# Patient Record
Sex: FEMALE | Race: WHITE | NOT HISPANIC OR LATINO | Employment: OTHER | URBAN - METROPOLITAN AREA
[De-identification: names, ages, dates, MRNs, and addresses within clinical notes are randomized per-mention and may not be internally consistent; named-entity substitution may affect disease eponyms.]

---

## 2018-05-22 ENCOUNTER — HISTORICAL (OUTPATIENT)
Dept: RADIOLOGY | Facility: HOSPITAL | Age: 58
End: 2018-05-22

## 2019-03-10 ENCOUNTER — HOSPITAL ENCOUNTER (OUTPATIENT)
Dept: MEDSURG UNIT | Facility: HOSPITAL | Age: 59
End: 2019-03-12
Attending: INTERNAL MEDICINE | Admitting: INTERNAL MEDICINE

## 2019-04-15 ENCOUNTER — HISTORICAL (OUTPATIENT)
Dept: ADMINISTRATIVE | Facility: HOSPITAL | Age: 59
End: 2019-04-15

## 2019-04-16 ENCOUNTER — HISTORICAL (OUTPATIENT)
Dept: RADIOLOGY | Facility: HOSPITAL | Age: 59
End: 2019-04-16

## 2020-12-24 ENCOUNTER — HISTORICAL (OUTPATIENT)
Dept: RADIOLOGY | Facility: HOSPITAL | Age: 60
End: 2020-12-24

## 2021-07-13 ENCOUNTER — HISTORICAL (OUTPATIENT)
Dept: CARDIOLOGY | Facility: HOSPITAL | Age: 61
End: 2021-07-13

## 2021-09-04 ENCOUNTER — HISTORICAL (OUTPATIENT)
Dept: ADMINISTRATIVE | Facility: HOSPITAL | Age: 61
End: 2021-09-04

## 2021-09-06 LAB — FINAL CULTURE: NORMAL

## 2021-09-07 LAB — FINAL CULTURE: NO GROWTH

## 2022-03-14 ENCOUNTER — HISTORICAL (OUTPATIENT)
Dept: ADMINISTRATIVE | Facility: HOSPITAL | Age: 62
End: 2022-03-14

## 2022-04-09 ENCOUNTER — HISTORICAL (OUTPATIENT)
Dept: ADMINISTRATIVE | Facility: HOSPITAL | Age: 62
End: 2022-04-09
Payer: MEDICAID

## 2022-04-26 VITALS
OXYGEN SATURATION: 96 % | DIASTOLIC BLOOD PRESSURE: 86 MMHG | HEIGHT: 64 IN | WEIGHT: 169.75 LBS | SYSTOLIC BLOOD PRESSURE: 160 MMHG | BODY MASS INDEX: 28.98 KG/M2
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
## 2022-07-19 ENCOUNTER — HOSPITAL ENCOUNTER (EMERGENCY)
Facility: HOSPITAL | Age: 62
Discharge: HOME | End: 2022-07-19
Attending: FAMILY MEDICINE
Payer: MEDICAID

## 2022-07-19 VITALS
DIASTOLIC BLOOD PRESSURE: 70 MMHG | TEMPERATURE: 98 F | BODY MASS INDEX: 28.32 KG/M2 | SYSTOLIC BLOOD PRESSURE: 138 MMHG | WEIGHT: 165.88 LBS | HEART RATE: 70 BPM | RESPIRATION RATE: 20 BRPM | OXYGEN SATURATION: 98 % | HEIGHT: 64 IN

## 2022-07-19 DIAGNOSIS — S29.011A CHEST WALL MUSCLE STRAIN, INITIAL ENCOUNTER: ICD-10-CM

## 2022-07-19 DIAGNOSIS — R10.9 RIGHT FLANK PAIN: Primary | ICD-10-CM

## 2022-07-19 LAB
ALBUMIN SERPL-MCNC: 3.9 GM/DL (ref 3.4–4.8)
ALBUMIN/GLOB SERPL: 1.3 RATIO (ref 1.1–2)
ALP SERPL-CCNC: 73 UNIT/L (ref 40–150)
ALT SERPL-CCNC: 29 UNIT/L (ref 0–55)
APPEARANCE UR: CLEAR
AST SERPL-CCNC: 21 UNIT/L (ref 5–34)
BACTERIA #/AREA URNS AUTO: ABNORMAL /HPF
BASOPHILS # BLD AUTO: 0.07 X10(3)/MCL (ref 0–0.2)
BASOPHILS NFR BLD AUTO: 0.9 %
BILIRUB UR QL STRIP.AUTO: NEGATIVE MG/DL
BILIRUBIN DIRECT+TOT PNL SERPL-MCNC: 0.2 MG/DL
BUN SERPL-MCNC: 14.4 MG/DL (ref 9.8–20.1)
CALCIUM SERPL-MCNC: 9.5 MG/DL (ref 8.4–10.2)
CHLORIDE SERPL-SCNC: 104 MMOL/L (ref 98–107)
CO2 SERPL-SCNC: 24 MMOL/L (ref 23–31)
COLOR UR AUTO: COLORLESS
CREAT SERPL-MCNC: 0.73 MG/DL (ref 0.55–1.02)
EOSINOPHIL # BLD AUTO: 0.17 X10(3)/MCL (ref 0–0.9)
EOSINOPHIL NFR BLD AUTO: 2.3 %
ERYTHROCYTE [DISTWIDTH] IN BLOOD BY AUTOMATED COUNT: 12.1 % (ref 11.5–17)
GLOBULIN SER-MCNC: 3.1 GM/DL (ref 2.4–3.5)
GLUCOSE SERPL-MCNC: 161 MG/DL (ref 82–115)
GLUCOSE UR QL STRIP.AUTO: NORMAL MG/DL
HCT VFR BLD AUTO: 41.6 % (ref 37–47)
HGB BLD-MCNC: 13.8 GM/DL (ref 12–16)
HYALINE CASTS #/AREA URNS LPF: ABNORMAL /LPF
IMM GRANULOCYTES # BLD AUTO: 0.04 X10(3)/MCL (ref 0–0.04)
IMM GRANULOCYTES NFR BLD AUTO: 0.5 %
KETONES UR QL STRIP.AUTO: NEGATIVE MG/DL
LEUKOCYTE ESTERASE UR QL STRIP.AUTO: 25 UNIT/L
LYMPHOCYTES # BLD AUTO: 2.39 X10(3)/MCL (ref 0.6–4.6)
LYMPHOCYTES NFR BLD AUTO: 32.3 %
MCH RBC QN AUTO: 30.1 PG (ref 27–31)
MCHC RBC AUTO-ENTMCNC: 33.2 MG/DL (ref 33–36)
MCV RBC AUTO: 90.8 FL (ref 80–94)
MONOCYTES # BLD AUTO: 0.6 X10(3)/MCL (ref 0.1–1.3)
MONOCYTES NFR BLD AUTO: 8.1 %
NEUTROPHILS # BLD AUTO: 4.1 X10(3)/MCL (ref 2.1–9.2)
NEUTROPHILS NFR BLD AUTO: 55.9 %
NITRITE UR QL STRIP.AUTO: NEGATIVE
NRBC BLD AUTO-RTO: 0 %
PH UR STRIP.AUTO: 6 [PH]
PLATELET # BLD AUTO: 308 X10(3)/MCL (ref 130–400)
PMV BLD AUTO: 9.5 FL (ref 7.4–10.4)
POTASSIUM SERPL-SCNC: 3.7 MMOL/L (ref 3.5–5.1)
PROT SERPL-MCNC: 7 GM/DL (ref 5.8–7.6)
PROT UR QL STRIP.AUTO: NEGATIVE MG/DL
RBC # BLD AUTO: 4.58 X10(6)/MCL (ref 4.2–5.4)
RBC #/AREA URNS AUTO: ABNORMAL /HPF
RBC UR QL AUTO: NEGATIVE UNIT/L
SODIUM SERPL-SCNC: 138 MMOL/L (ref 136–145)
SP GR UR STRIP.AUTO: 1.01
SQUAMOUS #/AREA URNS LPF: ABNORMAL /HPF
UROBILINOGEN UR STRIP-ACNC: NORMAL MG/DL
WBC # SPEC AUTO: 7.4 X10(3)/MCL (ref 4.5–11.5)
WBC #/AREA URNS AUTO: ABNORMAL /HPF

## 2022-07-19 PROCEDURE — 81001 URINALYSIS AUTO W/SCOPE: CPT | Performed by: FAMILY MEDICINE

## 2022-07-19 PROCEDURE — 96374 THER/PROPH/DIAG INJ IV PUSH: CPT

## 2022-07-19 PROCEDURE — 63600175 PHARM REV CODE 636 W HCPCS: Performed by: FAMILY MEDICINE

## 2022-07-19 PROCEDURE — 96361 HYDRATE IV INFUSION ADD-ON: CPT

## 2022-07-19 PROCEDURE — 96375 TX/PRO/DX INJ NEW DRUG ADDON: CPT

## 2022-07-19 PROCEDURE — 99285 EMERGENCY DEPT VISIT HI MDM: CPT | Mod: 25

## 2022-07-19 PROCEDURE — 85025 COMPLETE CBC W/AUTO DIFF WBC: CPT | Performed by: FAMILY MEDICINE

## 2022-07-19 PROCEDURE — 36415 COLL VENOUS BLD VENIPUNCTURE: CPT | Performed by: FAMILY MEDICINE

## 2022-07-19 PROCEDURE — 80053 COMPREHEN METABOLIC PANEL: CPT | Performed by: FAMILY MEDICINE

## 2022-07-19 PROCEDURE — 25000003 PHARM REV CODE 250: Performed by: FAMILY MEDICINE

## 2022-07-19 RX ORDER — ONDANSETRON 2 MG/ML
4 INJECTION INTRAMUSCULAR; INTRAVENOUS
Status: COMPLETED | OUTPATIENT
Start: 2022-07-19 | End: 2022-07-19

## 2022-07-19 RX ORDER — ONDANSETRON 4 MG/1
4 TABLET, ORALLY DISINTEGRATING ORAL EVERY 6 HOURS PRN
Qty: 10 TABLET | Refills: 0 | Status: SHIPPED | OUTPATIENT
Start: 2022-07-19 | End: 2022-07-24

## 2022-07-19 RX ORDER — PROCHLORPERAZINE EDISYLATE 5 MG/ML
10 INJECTION INTRAMUSCULAR; INTRAVENOUS
Status: COMPLETED | OUTPATIENT
Start: 2022-07-19 | End: 2022-07-19

## 2022-07-19 RX ORDER — KETOROLAC TROMETHAMINE 30 MG/ML
30 INJECTION, SOLUTION INTRAMUSCULAR; INTRAVENOUS
Status: COMPLETED | OUTPATIENT
Start: 2022-07-19 | End: 2022-07-19

## 2022-07-19 RX ORDER — TRAMADOL HYDROCHLORIDE 50 MG/1
50 TABLET ORAL EVERY 6 HOURS PRN
Qty: 16 TABLET | Refills: 0 | Status: SHIPPED | OUTPATIENT
Start: 2022-07-19 | End: 2022-07-24

## 2022-07-19 RX ADMIN — KETOROLAC TROMETHAMINE 30 MG: 30 INJECTION, SOLUTION INTRAMUSCULAR; INTRAVENOUS at 04:07

## 2022-07-19 RX ADMIN — PROCHLORPERAZINE EDISYLATE 10 MG: 5 INJECTION INTRAMUSCULAR; INTRAVENOUS at 04:07

## 2022-07-19 RX ADMIN — ONDANSETRON 4 MG: 2 INJECTION INTRAMUSCULAR; INTRAVENOUS at 03:07

## 2022-07-19 RX ADMIN — SODIUM CHLORIDE 1000 ML: 9 INJECTION, SOLUTION INTRAVENOUS at 03:07

## 2022-07-19 NOTE — ED PROVIDER NOTES
Encounter Date: 2022       History     Chief Complaint   Patient presents with    Flank Pain     Pt reports lt lower back pain radiating around abdomen to lt groin area. Pt also c/o nausea. Requesting nausea medication in triage.      63 y/o female with a history of HTN and DMII presents to the Er with complaints of right sided flank pain that began on Saturday (3 days prior) and has gradually worsened.  Patient reports pain is worse with certain movements, and occasionally results in nausea.  No vomiting.  No dysuria or hematuria.  No constipation or diarrhea, last bowel movement was yesterday.  Reports pain as 8/10 currently.  Describes as sharp and cramping.        Review of patient's allergies indicates:   Allergen Reactions    Lisinopril Swelling    Penicillins     Sulfa (sulfonamide antibiotics)     Meloxicam Palpitations     Past Medical History:   Diagnosis Date    Diabetes mellitus     Hypertension      Past Surgical History:   Procedure Laterality Date    BACK SURGERY       SECTION       History reviewed. No pertinent family history.  Social History     Tobacco Use    Smoking status: Current Every Day Smoker    Smokeless tobacco: Never Used     Review of Systems   Constitutional: Negative for chills, fatigue and fever.   HENT: Negative for ear pain, rhinorrhea and sore throat.    Eyes: Negative for photophobia and pain.   Respiratory: Negative for cough, shortness of breath and wheezing.    Cardiovascular: Negative for chest pain.   Gastrointestinal: Negative for abdominal pain, diarrhea, nausea and vomiting.   Genitourinary: Positive for flank pain. Negative for dysuria.   Neurological: Negative for dizziness, weakness and headaches.   All other systems reviewed and are negative.      Physical Exam     Initial Vitals [22 0253]   BP Pulse Resp Temp SpO2   (!) 148/78 65 18 98 °F (36.7 °C) 97 %      MAP       --         Physical Exam    Nursing note and vitals  reviewed.  Constitutional: She appears well-developed and well-nourished. No distress.   Uncomfortable due to pain; non-toxic; no distress.   HENT:   Head: Normocephalic and atraumatic.   Eyes: Conjunctivae and EOM are normal. Pupils are equal, round, and reactive to light.   Neck: Neck supple.   Normal range of motion.  Cardiovascular: Normal rate, regular rhythm and normal heart sounds.   Pulmonary/Chest: Breath sounds normal. No respiratory distress. She has no wheezes. She has no rhonchi. She has no rales.   Abdominal: Abdomen is soft. Bowel sounds are normal. She exhibits no distension. There is no abdominal tenderness.   No CVA tenderness, mild right groin tenderness.  Negative Mcburney point tenderness.  No rebound or guarding. There is no rebound and no guarding.   Musculoskeletal:         General: Normal range of motion.      Cervical back: Normal range of motion and neck supple.     Neurological: She is alert and oriented to person, place, and time.   Skin: Skin is warm and dry. Capillary refill takes less than 2 seconds. No erythema.   Psychiatric: She has a normal mood and affect. Her behavior is normal. Judgment and thought content normal.         ED Course   Procedures  Labs Reviewed   COMPREHENSIVE METABOLIC PANEL - Abnormal; Notable for the following components:       Result Value    Glucose Level 161 (*)     All other components within normal limits   URINALYSIS, REFLEX TO URINE CULTURE - Abnormal; Notable for the following components:    Leukocyte Esterase, UA 25  (*)     Bacteria, UA Trace (*)     Squamous Epithelial Cells, UA Trace (*)     All other components within normal limits   CBC W/ AUTO DIFFERENTIAL    Narrative:     The following orders were created for panel order CBC Auto Differential.  Procedure                               Abnormality         Status                     ---------                               -----------         ------                     CBC with  Differential[[id number]]                            Final result                 Please view results for these tests on the individual orders.   CBC WITH DIFFERENTIAL          Imaging Results          CT Abdomen Pelvis  Without Contrast (Preliminary result)  Result time 07/19/22 03:52:58    Preliminary result by Shakir Arshad Jr., MD (07/19/22 03:52:58)                 Narrative:    START OF REPORT:  Technique: CT of the abdomen and pelvis was performed with axial images as well as sagittal and coronal reconstruction images without intravenous contrast.    Comparison: None available.    Clinical History: Lower right back pain.    Dosage Information: Automated Exposure Control was utilized.    Findings:  Thorax:  Lungs: There is mild nonspecific dependent change at the lung bases.  Pleura: No effusions or thickening.  Heart: The heart size is within normal limits.  Abdomen:  Abdominal Wall: No abdominal wall pathology is seen.  Liver: Slightly enlarged measuring 18cm at the mid clavicular line.  Biliary System: No intrahepatic or extrahepatic biliary duct dilatation is seen.  Gallbladder: The gallbladder appears unremarkable.  Pancreas: The pancreas appears unremarkable.  Spleen: The spleen appears unremarkable.  Adrenals: The adrenal glands appear unremarkable.  Kidneys: The kidneys appear unremarkable with no stones cysts masses or hydronephrosis.  Aorta: There is mild calcification of the abdominal aorta and its branches.  IVC: Unremarkable.  Bowel:  Esophagus: The visualized esophagus appears unremarkable.  Stomach: The stomach appears unremarkable.  Duodenum: Unremarkable appearing duodenum.  Small Bowel: The small bowel appears unremarkable.  Colon: Nondistended.  Appendix: The appendix appears unremarkable.  Peritoneum: No intraperitoneal free air or ascites is seen.    Pelvis:  Bladder: The bladder appears unremarkable.  Female:  Uterus: The uterus appears unremarkable.  Ovaries: No adnexal masses are  seen.    Bony structures:  Dorsal Spine: There is mild spondylosis of the visualized dorsal spine.      Impression:  1. No acute intraabdominal or pelvic pathology is identified. Details and findings as discussed.                                   Medications   ondansetron injection 4 mg (4 mg Intravenous Given 7/19/22 0302)   sodium chloride 0.9% bolus 1,000 mL (0 mLs Intravenous Stopped 7/19/22 0402)   ketorolac injection 30 mg (30 mg Intravenous Given 7/19/22 0416)   prochlorperazine injection Soln 10 mg (10 mg Intravenous Given 7/19/22 0416)     Medical Decision Making:   Differential Diagnosis:   Ureterolithasis, Musculoskeletal pain, Appendicitis, Pyelonephritis, Acute cystitis             ED Course as of 07/19/22 0507   Tue Jul 19, 2022   0502 Feeling improved.  Patient currently using baclofen at home for back pain.  Discussed with patient about IV robaxin, but no longer desires.  ER precautions for any acute worsening.  Findings appear consistent with musculoskeletal pain. [MW]   0503 Sodium: 138 [MW]   0503 Potassium: 3.7 [MW]   0503 Chloride: 104 [MW]   0503 CO2: 24 [MW]   0503 Glucose(!): 161 [MW]   0503 BUN: 14.4 [MW]   0503 Creatinine: 0.73 [MW]   0503 ALT: 29 [MW]   0503 AST: 21 [MW]   0503 WBC: 7.4 [MW]   0503 Hemoglobin: 13.8 [MW]   0503 Hematocrit: 41.6 [MW]   0503 Platelets: 308 [MW]   0503 NITRITE UA: Negative [MW]   0503 Color, UA: Colorless [MW]   0503 Appearance, UA: Clear [MW]   0504 Bacteria, UA(!): Trace [MW]   0504 Squamous Epithelial Cells, UA(!): Trace [MW]   0504 Hyaline Casts, UA: None Seen [MW]   0504 RBC, UA: 0-5 [MW]   0504 WBC, UA: 0-5 [MW]      ED Course User Index  [MW] Nadir Arce MD             Clinical Impression:   Final diagnoses:  [R10.9] Right flank pain (Primary)  [S29.011A] Chest wall muscle strain, initial encounter          ED Disposition Condition    Discharge Stable        ED Prescriptions     Medication Sig Dispense Start Date End Date Auth. Provider     traMADoL (ULTRAM) 50 mg tablet Take 1 tablet (50 mg total) by mouth every 6 (six) hours as needed for Pain. 16 tablet 7/19/2022 7/24/2022 Nadir Arce MD    ondansetron (ZOFRAN-ODT) 4 MG TbDL Take 1 tablet (4 mg total) by mouth every 6 (six) hours as needed (nausea vomiting). 10 tablet 7/19/2022 7/24/2022 Nadir Arce MD        Follow-up Information     Follow up With Specialties Details Why Contact Info    Primary Care Physician  In 5 days      [facility] Emergency Medicine  As needed, If symptoms worsen [address] [facility] [address] [phone]           Nadir Arce MD  07/19/22 7561

## 2022-08-29 ENCOUNTER — HOSPITAL ENCOUNTER (EMERGENCY)
Facility: HOSPITAL | Age: 62
Discharge: HOME | End: 2022-08-29
Attending: EMERGENCY MEDICINE
Payer: MEDICAID

## 2022-08-29 VITALS
RESPIRATION RATE: 20 BRPM | TEMPERATURE: 98 F | DIASTOLIC BLOOD PRESSURE: 88 MMHG | OXYGEN SATURATION: 97 % | HEIGHT: 64 IN | HEART RATE: 70 BPM | WEIGHT: 161.38 LBS | BODY MASS INDEX: 27.55 KG/M2 | SYSTOLIC BLOOD PRESSURE: 146 MMHG

## 2022-08-29 DIAGNOSIS — M62.830 BACK MUSCLE SPASM: ICD-10-CM

## 2022-08-29 DIAGNOSIS — L03.90 WOUND CELLULITIS: ICD-10-CM

## 2022-08-29 DIAGNOSIS — M54.41 ACUTE RIGHT-SIDED LOW BACK PAIN WITH BILATERAL SCIATICA: Primary | ICD-10-CM

## 2022-08-29 DIAGNOSIS — M54.42 ACUTE RIGHT-SIDED LOW BACK PAIN WITH BILATERAL SCIATICA: Primary | ICD-10-CM

## 2022-08-29 DIAGNOSIS — M51.36 DEGENERATIVE DISC DISEASE, LUMBAR: ICD-10-CM

## 2022-08-29 LAB
ALBUMIN SERPL-MCNC: 4.4 GM/DL (ref 3.4–4.8)
ALBUMIN/GLOB SERPL: 1.3 RATIO (ref 1.1–2)
ALP SERPL-CCNC: 84 UNIT/L (ref 40–150)
ALT SERPL-CCNC: 35 UNIT/L (ref 0–55)
APPEARANCE UR: CLEAR
AST SERPL-CCNC: 23 UNIT/L (ref 5–34)
BACTERIA #/AREA URNS AUTO: ABNORMAL /HPF
BASOPHILS # BLD AUTO: 0.09 X10(3)/MCL (ref 0–0.2)
BASOPHILS NFR BLD AUTO: 1.3 %
BILIRUB UR QL STRIP.AUTO: NEGATIVE MG/DL
BILIRUBIN DIRECT+TOT PNL SERPL-MCNC: 0.3 MG/DL
BUN SERPL-MCNC: 12.4 MG/DL (ref 9.8–20.1)
CALCIUM SERPL-MCNC: 10.3 MG/DL (ref 8.4–10.2)
CHLORIDE SERPL-SCNC: 100 MMOL/L (ref 98–107)
CO2 SERPL-SCNC: 29 MMOL/L (ref 23–31)
COLOR UR AUTO: COLORLESS
CREAT SERPL-MCNC: 0.72 MG/DL (ref 0.55–1.02)
CRP SERPL-MCNC: 2.6 MG/L
EOSINOPHIL # BLD AUTO: 0.07 X10(3)/MCL (ref 0–0.9)
EOSINOPHIL NFR BLD AUTO: 1 %
ERYTHROCYTE [DISTWIDTH] IN BLOOD BY AUTOMATED COUNT: 11.5 % (ref 11.5–17)
GFR SERPLBLD CREATININE-BSD FMLA CKD-EPI: >60 MLS/MIN/1.73/M2
GLOBULIN SER-MCNC: 3.5 GM/DL (ref 2.4–3.5)
GLUCOSE SERPL-MCNC: 182 MG/DL (ref 82–115)
GLUCOSE UR QL STRIP.AUTO: NORMAL MG/DL
HCT VFR BLD AUTO: 47.5 % (ref 37–47)
HGB BLD-MCNC: 15.7 GM/DL (ref 12–16)
HYALINE CASTS #/AREA URNS LPF: ABNORMAL /LPF
IMM GRANULOCYTES # BLD AUTO: 0.02 X10(3)/MCL (ref 0–0.04)
IMM GRANULOCYTES NFR BLD AUTO: 0.3 %
KETONES UR QL STRIP.AUTO: NEGATIVE MG/DL
LEUKOCYTE ESTERASE UR QL STRIP.AUTO: NEGATIVE UNIT/L
LYMPHOCYTES # BLD AUTO: 2.17 X10(3)/MCL (ref 0.6–4.6)
LYMPHOCYTES NFR BLD AUTO: 31.7 %
MCH RBC QN AUTO: 30.3 PG (ref 27–31)
MCHC RBC AUTO-ENTMCNC: 33.1 MG/DL (ref 33–36)
MCV RBC AUTO: 91.5 FL (ref 80–94)
MONOCYTES # BLD AUTO: 0.38 X10(3)/MCL (ref 0.1–1.3)
MONOCYTES NFR BLD AUTO: 5.5 %
NEUTROPHILS # BLD AUTO: 4.1 X10(3)/MCL (ref 2.1–9.2)
NEUTROPHILS NFR BLD AUTO: 60.2 %
NITRITE UR QL STRIP.AUTO: NEGATIVE
NRBC BLD AUTO-RTO: 0 %
PH UR STRIP.AUTO: 6 [PH]
PLATELET # BLD AUTO: 343 X10(3)/MCL (ref 130–400)
PMV BLD AUTO: 8.8 FL (ref 7.4–10.4)
POCT GLUCOSE: 172 MG/DL (ref 70–110)
POTASSIUM SERPL-SCNC: 4.9 MMOL/L (ref 3.5–5.1)
PROT SERPL-MCNC: 7.9 GM/DL (ref 5.8–7.6)
PROT UR QL STRIP.AUTO: NEGATIVE MG/DL
RBC # BLD AUTO: 5.19 X10(6)/MCL (ref 4.2–5.4)
RBC #/AREA URNS AUTO: ABNORMAL /HPF
RBC UR QL AUTO: NEGATIVE UNIT/L
SODIUM SERPL-SCNC: 139 MMOL/L (ref 136–145)
SP GR UR STRIP.AUTO: 1
SQUAMOUS #/AREA URNS LPF: ABNORMAL /HPF
UROBILINOGEN UR STRIP-ACNC: NORMAL MG/DL
WBC # SPEC AUTO: 6.9 X10(3)/MCL (ref 4.5–11.5)
WBC #/AREA URNS AUTO: ABNORMAL /HPF

## 2022-08-29 PROCEDURE — 80053 COMPREHEN METABOLIC PANEL: CPT | Performed by: PHYSICIAN ASSISTANT

## 2022-08-29 PROCEDURE — 81001 URINALYSIS AUTO W/SCOPE: CPT | Performed by: PHYSICIAN ASSISTANT

## 2022-08-29 PROCEDURE — 99284 EMERGENCY DEPT VISIT MOD MDM: CPT | Mod: 25

## 2022-08-29 PROCEDURE — 36415 COLL VENOUS BLD VENIPUNCTURE: CPT | Performed by: PHYSICIAN ASSISTANT

## 2022-08-29 PROCEDURE — 63600175 PHARM REV CODE 636 W HCPCS: Performed by: PHYSICIAN ASSISTANT

## 2022-08-29 PROCEDURE — 96372 THER/PROPH/DIAG INJ SC/IM: CPT | Performed by: PHYSICIAN ASSISTANT

## 2022-08-29 PROCEDURE — 85025 COMPLETE CBC W/AUTO DIFF WBC: CPT | Performed by: PHYSICIAN ASSISTANT

## 2022-08-29 PROCEDURE — 82962 GLUCOSE BLOOD TEST: CPT

## 2022-08-29 PROCEDURE — 86140 C-REACTIVE PROTEIN: CPT | Performed by: PHYSICIAN ASSISTANT

## 2022-08-29 RX ORDER — CLINDAMYCIN HYDROCHLORIDE 300 MG/1
300 CAPSULE ORAL EVERY 6 HOURS
Qty: 40 CAPSULE | Refills: 0 | Status: SHIPPED | OUTPATIENT
Start: 2022-08-29 | End: 2022-09-08

## 2022-08-29 RX ORDER — KETOROLAC TROMETHAMINE 30 MG/ML
30 INJECTION, SOLUTION INTRAMUSCULAR; INTRAVENOUS
Status: COMPLETED | OUTPATIENT
Start: 2022-08-29 | End: 2022-08-29

## 2022-08-29 RX ORDER — HYDROCODONE BITARTRATE AND ACETAMINOPHEN 5; 325 MG/1; MG/1
1 TABLET ORAL EVERY 6 HOURS PRN
Qty: 12 TABLET | Refills: 0 | Status: SHIPPED | OUTPATIENT
Start: 2022-08-29 | End: 2023-04-27 | Stop reason: ALTCHOICE

## 2022-08-29 RX ORDER — DICLOFENAC SODIUM 75 MG/1
75 TABLET, DELAYED RELEASE ORAL 2 TIMES DAILY PRN
Qty: 30 TABLET | Refills: 0 | Status: SHIPPED | OUTPATIENT
Start: 2022-08-29 | End: 2023-06-08 | Stop reason: SDUPTHER

## 2022-08-29 RX ADMIN — KETOROLAC TROMETHAMINE 30 MG: 30 INJECTION, SOLUTION INTRAMUSCULAR at 02:08

## 2022-08-29 NOTE — ED PROVIDER NOTES
Encounter Date: 2022       History     Chief Complaint   Patient presents with    Wound Infection     Pt reports two wounds to right forearm with reported odor x2 weeks. One wound from vacuum . Cause of other wound unknown. Hx of DM, cbg 172. Also reports lower right back pain x1 week.      Patient is a 62 year old female with hx of DM, HTN, who presents to the ED with two wounds on right fore arm ( x 2 weeks) that aren't healing and also complaints of lower back pain that radiates into bi legs x 1 week.   She states one of the wounds on forearm is from hitting it on vacuum  and the other one's cause is unknown.  She states she's been putting peroxide on wounds and covering them daily.   Her current CBG is 172.  She states she needs another PCP because she lost her primary physician and needs BP and DM medication.  She denies injury to back however she states she does lift up on patient's daily.  She denies fever, chills, nausea, vomiting, wound drainage, numbness to extremities, weakness, SOB.    The history is provided by the patient. No  was used.   Wound Check   Treated in ED: x 2 weeks. Treatments since wound repair include antibiotic ointment use and regular peroxide and water cleansings. There has been no drainage from the wound. Redness Status: scant. Swelling Status: mild. The pain has not changed. She has no difficulty moving the affected extremity or digit.   Review of patient's allergies indicates:   Allergen Reactions    Lisinopril Swelling    Penicillins     Sulfa (sulfonamide antibiotics)     Meloxicam Palpitations     Past Medical History:   Diagnosis Date    Diabetes mellitus     Hypertension      Past Surgical History:   Procedure Laterality Date    BACK SURGERY       SECTION       History reviewed. No pertinent family history.  Social History     Tobacco Use    Smoking status: Every Day    Smokeless tobacco: Never     Review of Systems    Constitutional:  Negative for chills and fever.   HENT: Negative.     Respiratory:  Negative for cough and shortness of breath.    Cardiovascular:  Negative for chest pain and palpitations.   Gastrointestinal:  Negative for abdominal pain, nausea and vomiting.   Genitourinary: Negative.    Musculoskeletal:  Positive for back pain (lower).   Skin:         Right forearm wounds x 2   Neurological: Negative.    Hematological: Negative.      Physical Exam     Initial Vitals [08/29/22 1103]   BP Pulse Resp Temp SpO2   (!) 150/90 74 20 98.1 °F (36.7 °C) 97 %      MAP       --         Physical Exam    Nursing note and vitals reviewed.  Constitutional: She appears well-developed and well-nourished.   HENT:   Nose: Nose normal.   Mouth/Throat: Oropharynx is clear and moist.   Eyes: Conjunctivae are normal.   Neck: Neck supple.   Normal range of motion.  Cardiovascular:  Normal rate, normal heart sounds and intact distal pulses.           Pulmonary/Chest: Breath sounds normal. She has no wheezes.   Abdominal: Abdomen is soft. Bowel sounds are normal. There is no abdominal tenderness.   Musculoskeletal:         General: Normal range of motion.      Cervical back: Normal range of motion and neck supple. No bony tenderness.      Thoracic back: No bony tenderness.      Lumbar back: Spasms present. No bony tenderness. Normal range of motion.     Neurological: She is alert and oriented to person, place, and time. She has normal strength.   Skin: Skin is warm. Capillary refill takes less than 2 seconds.            ED Course   Procedures  Labs Reviewed   COMPREHENSIVE METABOLIC PANEL - Abnormal; Notable for the following components:       Result Value    Glucose Level 182 (*)     Calcium Level Total 10.3 (*)     Protein Total 7.9 (*)     All other components within normal limits   URINALYSIS, REFLEX TO URINE CULTURE - Abnormal; Notable for the following components:    Squamous Epithelial Cells, UA Trace (*)     All other components  within normal limits   CBC WITH DIFFERENTIAL - Abnormal; Notable for the following components:    Hct 47.5 (*)     All other components within normal limits   POCT GLUCOSE - Abnormal; Notable for the following components:    POCT Glucose 172 (*)     All other components within normal limits   C-REACTIVE PROTEIN - Normal   CBC W/ AUTO DIFFERENTIAL    Narrative:     The following orders were created for panel order CBC Auto Differential.  Procedure                               Abnormality         Status                     ---------                               -----------         ------                     CBC with Differential[[id number]]        Abnormal            Final result                 Please view results for these tests on the individual orders.          Imaging Results              X-Ray Lumbar Spine Ap And Lateral (Final result)  Result time 08/29/22 13:16:02      Final result by Sera Lilly MD (08/29/22 13:16:02)                   Impression:      1. No acute abnormality identified.  2. Mild degenerative changes of the lumbar spine.      Electronically signed by: Sera Lilly  Date:    08/29/2022  Time:    13:16               Narrative:    EXAMINATION:  XR LUMBAR SPINE AP AND LATERAL    CLINICAL HISTORY:  lower back pain;    COMPARISON:  X-rays dated 09/29/2018    FINDINGS:  There are 5 non-rib-bearing lumbar type vertebral bodies.  There is grade 1 anterolisthesis of L4 over L5 and L5 over S1.  The vertebral body heights are maintained.  There is mild disc height loss at L5-S1 with small multilevel marginal osteophytes.  Mild facet arthropathy is present in the lower lumbar spine.  There are scattered vascular calcifications.                                       X-Ray Forearm Right (Final result)  Result time 08/29/22 13:35:09      Final result by Nilo Gonzalez MD (08/29/22 13:35:09)                   Impression:      No acute osseous abnormality.      Electronically signed by: Nilo  Katejanelle  Date:    08/29/2022  Time:    13:35               Narrative:    EXAMINATION:  XR FOREARM RIGHT    CLINICAL HISTORY:  Cellulitis, unspecified    COMPARISON:  None.    FINDINGS:  No acute displaced fractures or dislocations.    Joint spaces preserved.    No blastic or lytic lesions.    Soft tissues within normal limits.                                       Medications   ketorolac injection 30 mg (30 mg Intramuscular Given 8/29/22 1432)     Medical Decision Making:   Clinical Tests:   Lab Tests: Ordered and Reviewed  Radiological Study: Ordered and Reviewed  ED Management:  The patient is resting comfortably and in no acute distress.  She states that her symptoms have improved after treatment in Emergency Department. I personally discussed her test results and treatment plan. Referral sent to Medicine clinic.  Patient will call to schedule an apt.   Gave strict ED precautions.  Specific conditions for return to the emergency department and importance of follow up with her primary care provider or the physician listed on the discharge instructions.  Patient voices understanding and agrees to the plan discussed. All patients' questions have been answered at this time.   She has remained hemodynamically stable throughout entire stay in ED and is stable for discharge home.              ED Course as of 08/29/22 1456   Mon Aug 29, 2022   1249 WBC: 6.9 [ER]   1249 CRP: 2.60 [ER]   1338 X-Ray Forearm Right  No acute osseous abnormality. [ER]      ED Course User Index  [ER] GABINO Miles             Clinical Impression:   Final diagnoses:  [L03.90] Wound cellulitis  [M54.42, M54.41] Acute right-sided low back pain with bilateral sciatica (Primary)  [M51.36] Degenerative disc disease, lumbar  [M62.830] Back muscle spasm        ED Disposition Condition    Discharge Stable          ED Prescriptions       Medication Sig Dispense Start Date End Date Auth. Provider    clindamycin (CLEOCIN) 300 MG capsule Take 1  capsule (300 mg total) by mouth every 6 (six) hours. for 10 days 40 capsule 8/29/2022 9/8/2022 GABINO Miles    diclofenac (VOLTAREN) 75 MG EC tablet Take 1 tablet (75 mg total) by mouth 2 (two) times daily as needed (pain). Do not take this with Advil, Ibuprofen, Motrin, Asprin, or Toradol. 30 tablet 8/29/2022 -- GABINO Miles    HYDROcodone-acetaminophen (NORCO) 5-325 mg per tablet Take 1 tablet by mouth every 6 (six) hours as needed for Pain. 12 tablet 8/29/2022 -- GABINO Miles          Follow-up Information       Follow up With Specialties Details Why Contact Info Additional Information    [facility] - Emergency Dept Emergency Medicine  As needed, If symptoms worsen [address] [place] [id number] [phone]     [facility] - Internal Medicine Internal Medicine Call  Ask For Internal Medicine clinic.  Once connected to clinic, inform them you were referred to clinic to establish care with a pcp. [facility] [id number] [phone] Internal Medicine Clinic Entrance #1             GABINO Miles  08/29/22 4469

## 2022-08-29 NOTE — ED PROVIDER NOTES
Encounter Date: 2022       History     Chief Complaint   Patient presents with    Wound Infection     Pt reports two wounds to right forearm with reported odor x2 weeks. One wound from vacuum . Cause of other wound unknown. Hx of DM, cbg 172. Also reports lower right back pain x1 week.      The history is provided by the patient. No  was used.   Wound Check   Treated in ED: x 2 weeks.   Review of patient's allergies indicates:   Allergen Reactions    Lisinopril Swelling    Penicillins     Sulfa (sulfonamide antibiotics)     Meloxicam Palpitations     Past Medical History:   Diagnosis Date    Diabetes mellitus     Hypertension      Past Surgical History:   Procedure Laterality Date    BACK SURGERY       SECTION       History reviewed. No pertinent family history.  Social History     Tobacco Use    Smoking status: Every Day    Smokeless tobacco: Never     Review of Systems    Physical Exam     Initial Vitals [22 1103]   BP Pulse Resp Temp SpO2   (!) 150/90 74 20 98.1 °F (36.7 °C) 97 %      MAP       --         Physical Exam    ED Course   Procedures  Labs Reviewed   COMPREHENSIVE METABOLIC PANEL - Abnormal; Notable for the following components:       Result Value    Glucose Level 182 (*)     Calcium Level Total 10.3 (*)     Protein Total 7.9 (*)     All other components within normal limits   URINALYSIS, REFLEX TO URINE CULTURE - Abnormal; Notable for the following components:    Squamous Epithelial Cells, UA Trace (*)     All other components within normal limits   CBC WITH DIFFERENTIAL - Abnormal; Notable for the following components:    Hct 47.5 (*)     All other components within normal limits   POCT GLUCOSE - Abnormal; Notable for the following components:    POCT Glucose 172 (*)     All other components within normal limits   C-REACTIVE PROTEIN - Normal   CBC W/ AUTO DIFFERENTIAL    Narrative:     The following orders were created for panel order CBC Auto  Differential.  Procedure                               Abnormality         Status                     ---------                               -----------         ------                     CBC with Differential[[id number]]        Abnormal            Final result                 Please view results for these tests on the individual orders.          Imaging Results              X-Ray Lumbar Spine Ap And Lateral (Final result)  Result time 08/29/22 13:16:02      Final result by Sera Lilly MD (08/29/22 13:16:02)                   Impression:      1. No acute abnormality identified.  2. Mild degenerative changes of the lumbar spine.      Electronically signed by: Sera Lilly  Date:    08/29/2022  Time:    13:16               Narrative:    EXAMINATION:  XR LUMBAR SPINE AP AND LATERAL    CLINICAL HISTORY:  lower back pain;    COMPARISON:  X-rays dated 09/29/2018    FINDINGS:  There are 5 non-rib-bearing lumbar type vertebral bodies.  There is grade 1 anterolisthesis of L4 over L5 and L5 over S1.  The vertebral body heights are maintained.  There is mild disc height loss at L5-S1 with small multilevel marginal osteophytes.  Mild facet arthropathy is present in the lower lumbar spine.  There are scattered vascular calcifications.                                       X-Ray Forearm Right (Final result)  Result time 08/29/22 13:35:09      Final result by Nilo Gonzalez MD (08/29/22 13:35:09)                   Impression:      No acute osseous abnormality.      Electronically signed by: Nilo Gonzalez  Date:    08/29/2022  Time:    13:35               Narrative:    EXAMINATION:  XR FOREARM RIGHT    CLINICAL HISTORY:  Cellulitis, unspecified    COMPARISON:  None.    FINDINGS:  No acute displaced fractures or dislocations.    Joint spaces preserved.    No blastic or lytic lesions.    Soft tissues within normal limits.                                       Medications   ketorolac injection 30 mg (has no  administration in time range)     Medical Decision Making:   Clinical Tests:   Lab Tests: Ordered and Reviewed  Radiological Study: Ordered and Reviewed  ED Management:  The patient is resting comfortably and in no acute distress.  She states that her symptoms have improved after treatment in Emergency Department. I personally discussed her test results and treatment plan.  Gave strict ED precautions.  Specific conditions for return to the emergency department and importance of follow up with her primary care provider or the physician listed on the discharge instructions.  Patient voices understanding and agrees to the plan discussed. All patients' questions have been answered at this time.   She has remained hemodynamically stable throughout entire stay in ED and is stable for discharge home.              ED Course as of 08/29/22 1437   Mon Aug 29, 2022   1249 WBC: 6.9 [ER]   1249 CRP: 2.60 [ER]   1338 X-Ray Forearm Right  No acute osseous abnormality. [ER]      ED Course User Index  [ER] GABINO Miles             Clinical Impression:   Final diagnoses:  [L03.90] Wound cellulitis  [M54.42, M54.41] Acute right-sided low back pain with bilateral sciatica (Primary)

## 2022-08-29 NOTE — Clinical Note
"Janet Corderococo Alvarez was seen and treated in our emergency department on 8/29/2022.  She may return to work on 08/30/2022.       If you have any questions or concerns, please don't hesitate to call.      GABINO Miles"

## 2022-08-29 NOTE — DISCHARGE INSTRUCTIONS
Take full prescription of antibiotics until complete with food and water.   Call Internal Medicine as directed to schedule an apt for primary physician.  Return to ED if symptoms worsen.      XR lumbar spine: 1. No acute abnormality identified.  2. Mild degenerative changes of the lumbar spine.

## 2022-09-12 ENCOUNTER — HOSPITAL ENCOUNTER (EMERGENCY)
Facility: HOSPITAL | Age: 62
Discharge: HOME | End: 2022-09-12
Attending: EMERGENCY MEDICINE
Payer: MEDICAID

## 2022-09-12 VITALS
DIASTOLIC BLOOD PRESSURE: 83 MMHG | TEMPERATURE: 98 F | BODY MASS INDEX: 28.04 KG/M2 | OXYGEN SATURATION: 98 % | RESPIRATION RATE: 18 BRPM | WEIGHT: 164.25 LBS | SYSTOLIC BLOOD PRESSURE: 152 MMHG | HEART RATE: 84 BPM | HEIGHT: 64 IN

## 2022-09-12 DIAGNOSIS — I10 HYPERTENSION, UNSPECIFIED TYPE: ICD-10-CM

## 2022-09-12 DIAGNOSIS — G44.209 ACUTE NON INTRACTABLE TENSION-TYPE HEADACHE: Primary | ICD-10-CM

## 2022-09-12 PROCEDURE — 96361 HYDRATE IV INFUSION ADD-ON: CPT

## 2022-09-12 PROCEDURE — 63600175 PHARM REV CODE 636 W HCPCS: Performed by: EMERGENCY MEDICINE

## 2022-09-12 PROCEDURE — 96374 THER/PROPH/DIAG INJ IV PUSH: CPT

## 2022-09-12 PROCEDURE — 96375 TX/PRO/DX INJ NEW DRUG ADDON: CPT

## 2022-09-12 PROCEDURE — 99285 EMERGENCY DEPT VISIT HI MDM: CPT | Mod: 25

## 2022-09-12 PROCEDURE — 25000003 PHARM REV CODE 250: Performed by: EMERGENCY MEDICINE

## 2022-09-12 PROCEDURE — 36415 COLL VENOUS BLD VENIPUNCTURE: CPT | Performed by: EMERGENCY MEDICINE

## 2022-09-12 RX ORDER — PROCHLORPERAZINE EDISYLATE 5 MG/ML
10 INJECTION INTRAMUSCULAR; INTRAVENOUS
Status: COMPLETED | OUTPATIENT
Start: 2022-09-12 | End: 2022-09-12

## 2022-09-12 RX ORDER — SODIUM CHLORIDE 9 MG/ML
INJECTION, SOLUTION INTRAVENOUS CONTINUOUS
Status: DISCONTINUED | OUTPATIENT
Start: 2022-09-12 | End: 2022-09-13 | Stop reason: HOSPADM

## 2022-09-12 RX ORDER — VALSARTAN 80 MG/1
160 TABLET ORAL DAILY
Qty: 180 TABLET | Refills: 3 | Status: SHIPPED | OUTPATIENT
Start: 2022-09-12 | End: 2023-04-27 | Stop reason: SDUPTHER

## 2022-09-12 RX ORDER — HYDRALAZINE HYDROCHLORIDE 20 MG/ML
20 INJECTION INTRAMUSCULAR; INTRAVENOUS
Status: COMPLETED | OUTPATIENT
Start: 2022-09-12 | End: 2022-09-12

## 2022-09-12 RX ADMIN — SODIUM CHLORIDE: 9 INJECTION, SOLUTION INTRAVENOUS at 09:09

## 2022-09-12 RX ADMIN — PROCHLORPERAZINE EDISYLATE 10 MG: 5 INJECTION INTRAMUSCULAR; INTRAVENOUS at 09:09

## 2022-09-12 RX ADMIN — HYDRALAZINE HYDROCHLORIDE 20 MG: 20 INJECTION INTRAMUSCULAR; INTRAVENOUS at 09:09

## 2022-09-13 NOTE — ED PROVIDER NOTES
Encounter Date: 2022       History     Chief Complaint   Patient presents with    Headache     Pt reports headache starting today around 1400 hrs, starting in rt temple and radiating down rt side of face, pt reports head pain to back side of head all this weekend, pt not taking bp meds  189/96.        62-year-old female presents for severe right-sided headache that began gradually around 2:00 p.m. today while she was at work, not associated with any strenuous activity or leaning forward.  She states she did run out of her blood pressure and diabetes medications, and requests an appointment with a doctor here.  No neck pain, no vomiting, no weakness.    Review of patient's allergies indicates:   Allergen Reactions    Lisinopril Swelling    Penicillins     Sulfa (sulfonamide antibiotics)     Meloxicam Palpitations     Past Medical History:   Diagnosis Date    Diabetes mellitus     Hypertension      Past Surgical History:   Procedure Laterality Date    BACK SURGERY       SECTION       History reviewed. No pertinent family history.  Social History     Tobacco Use    Smoking status: Every Day    Smokeless tobacco: Never     Review of Systems   Constitutional:  Negative for chills and fever.   HENT:  Negative for congestion, rhinorrhea and sore throat.    Respiratory:  Negative for chest tightness, shortness of breath and wheezing.    Cardiovascular:  Negative for chest pain, palpitations and leg swelling.   Gastrointestinal:  Negative for abdominal pain, blood in stool, diarrhea and vomiting.   Endocrine: Negative for polyuria.   Genitourinary:  Negative for dysuria and flank pain.   Musculoskeletal:  Negative for myalgias.   Skin:  Negative for rash.   Neurological:  Positive for headaches.   All other systems reviewed and are negative.    Physical Exam     Initial Vitals [22]   BP Pulse Resp Temp SpO2   (!) 189/96 76 19 98 °F (36.7 °C) 98 %      MAP       --         Physical Exam    Nursing note  and vitals reviewed.  Constitutional: She appears well-developed and well-nourished.   HENT:   Head: Normocephalic and atraumatic.   Eyes: Conjunctivae are normal. Pupils are equal, round, and reactive to light.   Neck: Neck supple.   Normal range of motion.  Cardiovascular:  Normal rate, regular rhythm, normal heart sounds and intact distal pulses.           Pulmonary/Chest: Breath sounds normal.   Abdominal: Abdomen is soft. Bowel sounds are normal. There is no abdominal tenderness.   Musculoskeletal:         General: No tenderness or edema. Normal range of motion.      Cervical back: Normal range of motion and neck supple.      Comments: Bilateral calves are symmetric and appearance with no significant skin abnormality; normal by palpation with no tenderness or palpable abnormality.     Neurological: She is alert and oriented to person, place, and time. No cranial nerve deficit or sensory deficit. Coordination normal.   Skin: Skin is warm and dry.   Psychiatric: She has a normal mood and affect.       ED Course   Procedures  Labs Reviewed   RAINBOW DRAW    Narrative:     The following orders were created for panel order [place] Draw.  Procedure                               Abnormality         Status                     ---------                               -----------         ------                     Light Blue Top Hold[[id number]]                              In process                 Red Top Hold[[id number]]                                     In process                 Lavender Top Hold[[id number]]                                In process                 Gold Top Hold[[id number]]                                    In process                   Please view results for these tests on the individual orders.   LIGHT BLUE TOP HOLD   RED TOP HOLD   LAVENDER TOP HOLD   GOLD TOP HOLD          Imaging Results              CT Head Without Contrast (Final result)  Result time 09/12/22 22:18:19      Final result by  Ray Tucker MD (09/12/22 22:18:19)                   Impression:      No acute abnormalities are seen      Electronically signed by: Ray Tucker MD  Date:    09/12/2022  Time:    22:18               Narrative:    EXAMINATION:  CT HEAD WITHOUT CONTRAST    CLINICAL HISTORY:  Headache, sudden, severe;    TECHNIQUE:  Low dose axial images were obtained through the head.  Coronal and sagittal reformations were also performed. Contrast was not administered.    Automatic exposure control (AEC) was utilized for dose reduction.    Dose: 938 mGycm    COMPARISON:  08/21/2017    FINDINGS:  Exam is degraded by motion    Ventricles of normal size and shape there is no shift of the midline noted.  There are no extra-axial fluid collections are areas consistent with hemorrhage noted.  No masses is seen no acute infarcts are noted.  The calvarium appears intact.                                       Medications   0.9%  NaCl infusion (has no administration in time range)   hydrALAZINE injection 20 mg (20 mg Intravenous Given 9/12/22 2131)   prochlorperazine injection Soln 10 mg (10 mg Intravenous Given 9/12/22 2131)     Medical Decision Making:   Initial Assessment:   Differential diagnoses considered includes but is not limited to tension headache, migraine headache, cluster headache, temporal arteritis, acute glaucoma, meningitis, transient ischemic attacks, subarachnoid hemorrhage, space occupying lesion, carotid artery dissection.   Patient's description of headache, history, and physical exam, as well as CT scan reviewed her up reassuring to help rule out serious pathology.  Patient symptoms are improving with treatment of blood pressure and headache.   Refilling patient's blood pressure meds and checking on clinic follow-up             ED Course as of 09/12/22 2244   Mon Sep 12, 2022   2231   At present, patient states her headache is somewhat improved.  She would like be released. [JG]      ED Course User Index  [JG]  Vinod Estes MD                 Clinical Impression:   Final diagnoses:  [G44.209] Acute non intractable tension-type headache (Primary)  [I10] Hypertension, unspecified type      ED Disposition Condition    Discharge Stable          ED Prescriptions       Medication Sig Dispense Start Date End Date Auth. Provider    valsartan (DIOVAN) 80 MG tablet Take 2 tablets (160 mg total) by mouth once daily. 180 tablet 9/12/2022 9/12/2023 Vinod Estes MD    linaGLIPtin (TRADJENTA) 5 mg Tab tablet Take 1 tablet (5 mg total) by mouth once daily. 90 tablet 9/12/2022 9/12/2023 Vinod Estes MD          Follow-up Information    None          Vinod Estes MD  09/12/22 0599

## 2023-01-02 ENCOUNTER — HOSPITAL ENCOUNTER (EMERGENCY)
Facility: HOSPITAL | Age: 63
Discharge: HOME | End: 2023-01-02
Attending: STUDENT IN AN ORGANIZED HEALTH CARE EDUCATION/TRAINING PROGRAM
Payer: MEDICAID

## 2023-01-02 VITALS
TEMPERATURE: 98 F | RESPIRATION RATE: 18 BRPM | HEIGHT: 64 IN | SYSTOLIC BLOOD PRESSURE: 118 MMHG | OXYGEN SATURATION: 99 % | HEART RATE: 77 BPM | DIASTOLIC BLOOD PRESSURE: 87 MMHG | BODY MASS INDEX: 27.92 KG/M2 | WEIGHT: 163.56 LBS

## 2023-01-02 DIAGNOSIS — R07.9 NONSPECIFIC CHEST PAIN: ICD-10-CM

## 2023-01-02 DIAGNOSIS — R05.9 COUGH, UNSPECIFIED TYPE: ICD-10-CM

## 2023-01-02 DIAGNOSIS — J32.9 SINUSITIS, UNSPECIFIED CHRONICITY, UNSPECIFIED LOCATION: Primary | ICD-10-CM

## 2023-01-02 LAB
ALBUMIN SERPL-MCNC: 4.2 G/DL (ref 3.4–4.8)
ALBUMIN/GLOB SERPL: 1.1 RATIO (ref 1.1–2)
ALP SERPL-CCNC: 80 UNIT/L (ref 40–150)
ALT SERPL-CCNC: 27 UNIT/L (ref 0–55)
AST SERPL-CCNC: 19 UNIT/L (ref 5–34)
BASOPHILS # BLD AUTO: 0.09 X10(3)/MCL (ref 0–0.2)
BASOPHILS NFR BLD AUTO: 1 %
BILIRUBIN DIRECT+TOT PNL SERPL-MCNC: 0.4 MG/DL
BUN SERPL-MCNC: 11.6 MG/DL (ref 9.8–20.1)
CALCIUM SERPL-MCNC: 10 MG/DL (ref 8.4–10.2)
CHLORIDE SERPL-SCNC: 106 MMOL/L (ref 98–107)
CO2 SERPL-SCNC: 25 MMOL/L (ref 23–31)
CREAT SERPL-MCNC: 0.77 MG/DL (ref 0.55–1.02)
EOSINOPHIL # BLD AUTO: 0.11 X10(3)/MCL (ref 0–0.9)
EOSINOPHIL NFR BLD AUTO: 1.2 %
ERYTHROCYTE [DISTWIDTH] IN BLOOD BY AUTOMATED COUNT: 11.7 % (ref 11–14.5)
FLUAV AG UPPER RESP QL IA.RAPID: NOT DETECTED
FLUBV AG UPPER RESP QL IA.RAPID: NOT DETECTED
GFR SERPLBLD CREATININE-BSD FMLA CKD-EPI: >60 MLS/MIN/1.73/M2
GLOBULIN SER-MCNC: 4 GM/DL (ref 2.4–3.5)
GLUCOSE SERPL-MCNC: 125 MG/DL (ref 82–115)
HCT VFR BLD AUTO: 42.6 % (ref 37–47)
HGB BLD-MCNC: 14.2 GM/DL (ref 12–16)
IMM GRANULOCYTES # BLD AUTO: 0.06 X10(3)/MCL (ref 0–0.04)
IMM GRANULOCYTES NFR BLD AUTO: 0.7 %
LYMPHOCYTES # BLD AUTO: 1.83 X10(3)/MCL (ref 0.6–4.6)
LYMPHOCYTES NFR BLD AUTO: 20.3 %
MCH RBC QN AUTO: 29.8 PG
MCHC RBC AUTO-ENTMCNC: 33.3 MG/DL (ref 33–36)
MCV RBC AUTO: 89.5 FL (ref 80–94)
MONOCYTES # BLD AUTO: 0.43 X10(3)/MCL (ref 0.1–1.3)
MONOCYTES NFR BLD AUTO: 4.8 %
NEUTROPHILS # BLD AUTO: 6.49 X10(3)/MCL (ref 2.1–9.2)
NEUTROPHILS NFR BLD AUTO: 72 %
NRBC BLD AUTO-RTO: 0 % (ref 0–1)
PLATELET # BLD AUTO: 316 X10(3)/MCL (ref 140–371)
PMV BLD AUTO: 8.8 FL (ref 9.4–12.4)
POTASSIUM SERPL-SCNC: 4.7 MMOL/L (ref 3.5–5.1)
PROT SERPL-MCNC: 8.2 GM/DL (ref 5.8–7.6)
RBC # BLD AUTO: 4.76 X10(6)/MCL (ref 4.2–5.4)
SARS-COV-2 RNA RESP QL NAA+PROBE: NOT DETECTED
SODIUM SERPL-SCNC: 140 MMOL/L (ref 136–145)
TROPONIN I SERPL-MCNC: <0.01 NG/ML (ref 0–0.04)
WBC # SPEC AUTO: 9 X10(3)/MCL (ref 4.5–11.5)

## 2023-01-02 PROCEDURE — 99285 EMERGENCY DEPT VISIT HI MDM: CPT | Mod: 25

## 2023-01-02 PROCEDURE — 84484 ASSAY OF TROPONIN QUANT: CPT | Performed by: NURSE PRACTITIONER

## 2023-01-02 PROCEDURE — 93005 ELECTROCARDIOGRAM TRACING: CPT

## 2023-01-02 PROCEDURE — 0240U COVID/FLU A&B PCR: CPT | Performed by: NURSE PRACTITIONER

## 2023-01-02 PROCEDURE — 80053 COMPREHEN METABOLIC PANEL: CPT | Performed by: NURSE PRACTITIONER

## 2023-01-02 PROCEDURE — 85025 COMPLETE CBC W/AUTO DIFF WBC: CPT | Performed by: NURSE PRACTITIONER

## 2023-01-02 RX ORDER — CETIRIZINE HYDROCHLORIDE 10 MG/1
10 TABLET ORAL DAILY
Qty: 14 TABLET | Refills: 0 | Status: SHIPPED | OUTPATIENT
Start: 2023-01-02 | End: 2023-04-27 | Stop reason: SDUPTHER

## 2023-01-02 RX ORDER — PROMETHAZINE HYDROCHLORIDE AND DEXTROMETHORPHAN HYDROBROMIDE 6.25; 15 MG/5ML; MG/5ML
5 SYRUP ORAL EVERY 6 HOURS PRN
Qty: 100 ML | Refills: 0 | Status: SHIPPED | OUTPATIENT
Start: 2023-01-02 | End: 2023-01-07

## 2023-01-02 RX ORDER — AZITHROMYCIN 250 MG/1
TABLET, FILM COATED ORAL
Qty: 6 TABLET | Refills: 0 | Status: SHIPPED | OUTPATIENT
Start: 2023-01-02 | End: 2023-04-27 | Stop reason: ALTCHOICE

## 2023-01-02 RX ORDER — FLUTICASONE PROPIONATE 50 MCG
1 SPRAY, SUSPENSION (ML) NASAL 2 TIMES DAILY PRN
Qty: 15 G | Refills: 0 | Status: SHIPPED | OUTPATIENT
Start: 2023-01-02 | End: 2023-04-27

## 2023-01-02 NOTE — Clinical Note
"Janet Ramirez" Antonio was seen and treated in our emergency department on 1/2/2023.  She may return to work on 01/04/2023.       If you have any questions or concerns, please don't hesitate to call.      SHARLA Sexton RN    "

## 2023-01-02 NOTE — ED PROVIDER NOTES
Encounter Date: 2023       History     Chief Complaint   Patient presents with    Nasal Congestion     C/o nasal congestion and body aches x1 w     Pt is a 62 y.o. female who presents to the [facility] ED complaining of centralized chest pain, sinus pain, and a cough x 1 week. Reports chest pain is worse with palpation and movement. Denies weakness, dizziness, fever, abdominal pain, or nausea/vomiting. Denies relief with OTC medications.    Review of patient's allergies indicates:   Allergen Reactions    Lisinopril Swelling    Penicillins     Sulfa (sulfonamide antibiotics)     Meloxicam Palpitations     Past Medical History:   Diagnosis Date    Diabetes mellitus     Hypertension      Past Surgical History:   Procedure Laterality Date    BACK SURGERY       SECTION       No family history on file.  Social History     Tobacco Use    Smoking status: Every Day    Smokeless tobacco: Never     Review of Systems   Constitutional:  Negative for chills, diaphoresis, fatigue and fever.   HENT:  Positive for rhinorrhea. Negative for facial swelling, sinus pressure, sinus pain, sore throat and trouble swallowing.    Respiratory:  Positive for cough. Negative for chest tightness, shortness of breath and wheezing.    Cardiovascular:  Positive for chest pain. Negative for palpitations and leg swelling.   Gastrointestinal:  Negative for abdominal pain, diarrhea, nausea and vomiting.   Genitourinary:  Negative for dysuria, flank pain, frequency, hematuria and urgency.   Musculoskeletal:  Negative for arthralgias, back pain, joint swelling and myalgias.   Skin:  Negative for color change and rash.   Neurological:  Negative for dizziness, syncope, weakness and light-headedness.   Hematological:  Does not bruise/bleed easily.   All other systems reviewed and are negative.    Physical Exam     Initial Vitals [23 0951]   BP Pulse Resp Temp SpO2   118/87 77 18 97.9 °F (36.6 °C) 99 %      MAP       --         Physical  Exam    Nursing note and vitals reviewed.  Constitutional: She appears well-developed and well-nourished.   HENT:   Head: Normocephalic and atraumatic.   Nose: Mucosal edema and rhinorrhea present.   Mouth/Throat: Oropharynx is clear and moist.   Erythema to bilateral nares.      Eyes: Conjunctivae and EOM are normal. Pupils are equal, round, and reactive to light.   Neck: Neck supple.   Normal range of motion.  Cardiovascular:  Normal rate, regular rhythm, normal heart sounds and intact distal pulses.           Pulmonary/Chest: Effort normal and breath sounds normal. No respiratory distress. She has no wheezes. She has no rhonchi. She has no rales. Chest wall is not dull to percussion. She exhibits tenderness. She exhibits no bony tenderness, no crepitus, no edema and no retraction.     Dry cough present.     Abdominal: Abdomen is soft and flat. Bowel sounds are normal. She exhibits no distension. There is no abdominal tenderness. There is no rebound, no guarding, no tenderness at McBurney's point and negative Anderson's sign. negative psoas sign  Musculoskeletal:         General: Normal range of motion.      Cervical back: Normal range of motion and neck supple.     Neurological: She is alert and oriented to person, place, and time. She has normal strength and normal reflexes.   Skin: Skin is warm and dry. Capillary refill takes less than 2 seconds.   Psychiatric: She has a normal mood and affect. Her speech is normal and behavior is normal. Judgment and thought content normal.       ED Course   Procedures  Labs Reviewed   COMPREHENSIVE METABOLIC PANEL - Abnormal; Notable for the following components:       Result Value    Glucose Level 125 (*)     Protein Total 8.2 (*)     Globulin 4.0 (*)     All other components within normal limits   CBC WITH DIFFERENTIAL - Abnormal; Notable for the following components:    MPV 8.8 (*)     IG# 0.06 (*)     All other components within normal limits   TROPONIN I - Normal    COVID/FLU A&B PCR - Normal    Narrative:     The Xpert Xpress SARS-CoV-2/FLU/RSV plus is a rapid, multiplexed real-time PCR test intended for the simultaneous qualitative detection and differentiation of SARS-CoV-2, Influenza A, Influenza B, and respiratory syncytial virus (RSV) viral RNA in either nasopharyngeal swab or nasal swab specimens.         CBC W/ AUTO DIFFERENTIAL    Narrative:     The following orders were created for panel order CBC auto differential.  Procedure                               Abnormality         Status                     ---------                               -----------         ------                     CBC with Differential[[id number]]        Abnormal            Final result                 Please view results for these tests on the individual orders.   EXTRA TUBES    Narrative:     The following orders were created for panel order EXTRA TUBES.  Procedure                               Abnormality         Status                     ---------                               -----------         ------                     Light Blue Top Hold[[id number]]                              In process                 Gold Top Hold[[id number]]                                    In process                   Please view results for these tests on the individual orders.   LIGHT BLUE TOP HOLD   GOLD TOP HOLD        ECG Results              EKG 12-lead (In process)  Result time 01/02/23 11:07:29      In process by Interface, Lab In [facility] (01/02/23 11:07:29)                   Narrative:    Test Reason : R07.9,    Vent. Rate : 067 BPM     Atrial Rate : 067 BPM     P-R Int : 144 ms          QRS Dur : 080 ms      QT Int : 410 ms       P-R-T Axes : 000 076 038 degrees     QTc Int : 433 ms    Sinus rhythm with Premature atrial complexes  Otherwise normal ECG  No previous ECGs available    Referred By: [email or web address]   SELF           Confirmed By:                                   Imaging Results               X-Ray Chest PA And Lateral (Final result)  Result time 01/02/23 11:21:44      Final result by Michael Huerta MD (01/02/23 11:21:44)                   Impression:      No acute pulmonary process appreciated.      Electronically signed by: Michael Huerta  Date:    01/02/2023  Time:    11:21               Narrative:    EXAMINATION:  XR CHEST PA AND LATERAL    CLINICAL HISTORY:  Chest pain, unspecified    TECHNIQUE:  PA and lateral radiographs of the chest    COMPARISON:  Radiography 03/14/2022    FINDINGS:  Normal cardiac silhouette. The lungs are well-inflated. No consolidation identified. No pleural effusion or discernible pneumothorax.                                       Medications - No data to display  Medical Decision Making:   Differential Diagnosis:   Nonspecific chest pain  AMI  URI  Pneumonia  COVID  Influenza  Clinical Tests:   Lab Tests: Ordered and Reviewed  Radiological Study: Ordered and Reviewed  Medical Tests: Reviewed and Ordered  ED Management:  11:47 AM Reassessed patient at this time. Reports condition has improved. Discussed with patient all pertinent ED information and results. Discussed diagnosis and treatment plan with patient. Follow up instructions and return to ED instruction have been given. All questions and concerns were addressed at this time. Patient voices understanding of information and instructions. Patient is comfortable with plan and discharge. Patient is stable for discharge.        APC / Resident Notes:   I was not physically present during the history, exam or disposition of this patient. I was available at all times for consultation. (Zmora)                   Clinical Impression:   Final diagnoses:  [R07.9] Nonspecific chest pain  [J32.9] Sinusitis, unspecified chronicity, unspecified location (Primary)  [R05.9] Cough, unspecified type        ED Disposition Condition    Discharge Stable          ED Prescriptions       Medication Sig Dispense Start Date End Date Auth.  Provider    cetirizine (ZYRTEC) 10 MG tablet Take 1 tablet (10 mg total) by mouth once daily. for 14 days 14 tablet 1/2/2023 1/16/2023 RACHEAL Mayfield Jr.    fluticasone propionate (FLONASE) 50 mcg/actuation nasal spray 1 spray (50 mcg total) by Each Nostril route 2 (two) times daily as needed for Rhinitis. 15 g 1/2/2023 -- RACHEAL Mayfield Jr.    promethazine-dextromethorphan (PROMETHAZINE-DM) 6.25-15 mg/5 mL Syrp Take 5 mLs by mouth every 6 (six) hours as needed (cough). 100 mL 1/2/2023 1/7/2023 RACHEAL Mayfield Jr.    azithromycin (Z-NILA) 250 MG tablet Take 2 tablets by mouth on day 1; Take 1 tablet by mouth on days 2-5 6 tablet 1/2/2023 -- RACHEAL Mayfield Jr.          Follow-up Information       Follow up With Specialties Details Why Contact Info    [facility] Dept Emergency Medicine In 3 days As needed, If symptoms worsen [address] [facility] [address] [phone]             RACHEAL Mayfield Jr.  01/02/23 1142       Michael Preston MD  01/02/23 2035

## 2023-01-02 NOTE — DISCHARGE INSTRUCTIONS
Follow up with your primary care physician in 3-5 days for follow up evaluation.  Increase fluid intake, use tylenol or motrin every 6-8 hours for temperature of 101 or greater.  Take medication as prescribed.  Return to the [facility] ED immediately for worsening chest pain, SOB, or fever.

## 2023-01-05 ENCOUNTER — PATIENT OUTREACH (OUTPATIENT)
Dept: EMERGENCY MEDICINE | Facility: HOSPITAL | Age: 63
End: 2023-01-05
Payer: MEDICAID

## 2023-01-09 NOTE — PROGRESS NOTES
.Why Should I Have My Own Doctor or Nurse Practitioner (PCP) to Take Care of Me  What is a PCP (Primary Care Provider)?    A primary care provider is a doctor or nurse practitioner who you can call for an appointment and will see you when you are sick.    You will also be seen at scheduled appointment times during the year to check on your diabetes, or high blood pressure, or heart disease.    Why see the same PCP (doctor/nurse practitioner)?    You can be seen faster when you are sick           You, the PCP (doctor/nurse practitioner) and the office staff get to know each other; you begin to trust them to care for you. You take part in your health choices.   All of you together are a team.    Your medicine is looked at every time you visit, to be sure you are taking the medicine, as the PCP (doctor/nurse practitioner) ordered.    Your PCP (doctor/nurse practitioner) and their staff help keep you healthy and out of the hospital.  They can catch sicknesses earlier by ordering tests once a year to stop or prevent the sickness from getting worse.      Your PCP (doctor/nurse practitioner) can send you to providers who specialize (heart/bone/lung) if you need.  They and their office staff help keep track of your seeing other providers (doctors/nurse practitioners) and tests (CT/ MRIs/ X Rays)) taken.        PCPs want you to stay healthy.  Let us care for you.       .DASH Meal Plan  (Healthy eating plan)    Why use this meal plan?    This healthy meal plan lowers blood pressure.  This meal plan lowers the chance of you getting Diabetes, heart disease and stroke.  This meal plan also helps you lose weight.    DASH balanced meal plannin or more servings of fruits and vegetables every day.  At each meal, try to fill half of your plated with fruits and vegetables                    Eat 6-8 servings of whole grains every day.  Whole grains are:  Brown rice, oatmeal, whole wheat bread, whole grain, low salt cereals, Donna  bread, whole wheat flour tortillas                5 ounces of meat, chicken, or fish each day (3-ounce size of serving of meat is the same size as a deck of playing cards)  Fish like sardines, salmon and mackerel are also good for your heart.            2 servings of low-fat dairy each day (Milk, cottage cheese, yogurt)          Do Not:  Use More than 1,500 milligrams of salt a day if you have high blood pressure (2/3 teaspoon)    You would look at labels and total milligrams of salt per day        Do Not Add salt when cooking      Do Not Salt food before eating    Do Not Eat fried foods  Do Not Cook using butter, stick margarine, lard, shortening, coconut oil    Do Not Buy regular canned vegetables, pickles, or olives (too much salt; use low salt or no salt)  Do Not Do not buy premade or frozen meals    Do Not Eat fast food/buffet           Speak with your Doctor/Nurse practitioner about exercising 30 minutes a day          Modified from DASH eating plan education   .        What Do I Do If I Wake Up Sick                                                     If you wake up sick, or you start to fill sick during the day, try these tips to get care and start to feel better soon.                                                                                                                              As soon as you start to feel bad, call your doctor's office and ask for same day or next day visit appointment.        If you cannot be seen with your doctor's office within 24 hours, you can go to an urgent care and be seen.          How to stay well:     Take your medicine as ordered by your doctor      Fill your Medicine before you run out      Exercise       Enjoy walking in the sunlight daily  Keep your scheduled clinic appointments      Keep you scheduled yearly wellness visits

## 2023-01-13 ENCOUNTER — PATIENT OUTREACH (OUTPATIENT)
Dept: EMERGENCY MEDICINE | Facility: HOSPITAL | Age: 63
End: 2023-01-13
Payer: MEDICAID

## 2023-02-14 ENCOUNTER — PATIENT OUTREACH (OUTPATIENT)
Dept: EMERGENCY MEDICINE | Facility: HOSPITAL | Age: 63
End: 2023-02-14
Payer: MEDICAID

## 2023-02-16 DIAGNOSIS — Z13.21 ENCOUNTER FOR VITAMIN DEFICIENCY SCREENING: ICD-10-CM

## 2023-02-16 DIAGNOSIS — Z11.3 SCREEN FOR STD (SEXUALLY TRANSMITTED DISEASE): ICD-10-CM

## 2023-02-16 DIAGNOSIS — Z13.220 LIPID SCREENING: ICD-10-CM

## 2023-02-16 DIAGNOSIS — I10 PRIMARY HYPERTENSION: Primary | ICD-10-CM

## 2023-02-16 DIAGNOSIS — Z13.29 THYROID DISORDER SCREEN: ICD-10-CM

## 2023-02-16 DIAGNOSIS — Z00.00 ENCOUNTER FOR WELLNESS EXAMINATION IN ADULT: ICD-10-CM

## 2023-02-16 DIAGNOSIS — E11.9 TYPE 2 DIABETES MELLITUS WITHOUT COMPLICATION, WITHOUT LONG-TERM CURRENT USE OF INSULIN: ICD-10-CM

## 2023-03-08 ENCOUNTER — PATIENT OUTREACH (OUTPATIENT)
Dept: EMERGENCY MEDICINE | Facility: HOSPITAL | Age: 63
End: 2023-03-08
Payer: MEDICAID

## 2023-03-16 ENCOUNTER — HOSPITAL ENCOUNTER (EMERGENCY)
Facility: HOSPITAL | Age: 63
Discharge: HOME | End: 2023-03-16
Attending: FAMILY MEDICINE
Payer: MEDICAID

## 2023-03-16 VITALS
TEMPERATURE: 98 F | OXYGEN SATURATION: 99 % | RESPIRATION RATE: 18 BRPM | DIASTOLIC BLOOD PRESSURE: 87 MMHG | HEIGHT: 64 IN | SYSTOLIC BLOOD PRESSURE: 150 MMHG | HEART RATE: 80 BPM | BODY MASS INDEX: 28 KG/M2 | WEIGHT: 164 LBS

## 2023-03-16 DIAGNOSIS — R10.9 ABDOMINAL PAIN IN FEMALE PATIENT: ICD-10-CM

## 2023-03-16 DIAGNOSIS — R07.9 CHEST PAIN: ICD-10-CM

## 2023-03-16 DIAGNOSIS — K59.00 CONSTIPATION: ICD-10-CM

## 2023-03-16 LAB
ALBUMIN SERPL-MCNC: 4.2 G/DL (ref 3.4–4.8)
ALBUMIN/GLOB SERPL: 1.2 RATIO (ref 1.1–2)
ALP SERPL-CCNC: 84 UNIT/L (ref 40–150)
ALT SERPL-CCNC: 33 UNIT/L (ref 0–55)
APPEARANCE UR: CLEAR
AST SERPL-CCNC: 20 UNIT/L (ref 5–34)
BACTERIA #/AREA URNS AUTO: ABNORMAL /HPF
BASOPHILS # BLD AUTO: 0.08 X10(3)/MCL (ref 0–0.2)
BASOPHILS NFR BLD AUTO: 0.8 %
BILIRUB UR QL STRIP.AUTO: NEGATIVE MG/DL
BILIRUBIN DIRECT+TOT PNL SERPL-MCNC: 0.3 MG/DL
BUN SERPL-MCNC: 12.5 MG/DL (ref 9.8–20.1)
CALCIUM SERPL-MCNC: 9.7 MG/DL (ref 8.4–10.2)
CHLORIDE SERPL-SCNC: 102 MMOL/L (ref 98–107)
CO2 SERPL-SCNC: 24 MMOL/L (ref 23–31)
COLOR UR AUTO: ABNORMAL
CREAT SERPL-MCNC: 0.87 MG/DL (ref 0.55–1.02)
EOSINOPHIL # BLD AUTO: 0.11 X10(3)/MCL (ref 0–0.9)
EOSINOPHIL NFR BLD AUTO: 1.1 %
ERYTHROCYTE [DISTWIDTH] IN BLOOD BY AUTOMATED COUNT: 11.9 % (ref 11.5–17)
GFR SERPLBLD CREATININE-BSD FMLA CKD-EPI: >60 MLS/MIN/1.73/M2
GLOBULIN SER-MCNC: 3.6 GM/DL (ref 2.4–3.5)
GLUCOSE SERPL-MCNC: 158 MG/DL (ref 82–115)
GLUCOSE UR QL STRIP.AUTO: NORMAL MG/DL
HCT VFR BLD AUTO: 43.6 % (ref 37–47)
HGB BLD-MCNC: 14.9 G/DL (ref 12–16)
HYALINE CASTS #/AREA URNS LPF: ABNORMAL /LPF
IMM GRANULOCYTES # BLD AUTO: 0.03 X10(3)/MCL (ref 0–0.04)
IMM GRANULOCYTES NFR BLD AUTO: 0.3 %
KETONES UR QL STRIP.AUTO: NEGATIVE MG/DL
LEUKOCYTE ESTERASE UR QL STRIP.AUTO: NEGATIVE UNIT/L
LIPASE SERPL-CCNC: 20 U/L
LYMPHOCYTES # BLD AUTO: 3.11 X10(3)/MCL (ref 0.6–4.6)
LYMPHOCYTES NFR BLD AUTO: 31.9 %
MCH RBC QN AUTO: 29.8 PG
MCHC RBC AUTO-ENTMCNC: 34.2 G/DL (ref 33–36)
MCV RBC AUTO: 87.2 FL (ref 80–94)
MONOCYTES # BLD AUTO: 0.62 X10(3)/MCL (ref 0.1–1.3)
MONOCYTES NFR BLD AUTO: 6.4 %
MUCOUS THREADS URNS QL MICRO: ABNORMAL /LPF
NEUTROPHILS # BLD AUTO: 5.79 X10(3)/MCL (ref 2.1–9.2)
NEUTROPHILS NFR BLD AUTO: 59.5 %
NITRITE UR QL STRIP.AUTO: NEGATIVE
NRBC BLD AUTO-RTO: 0 %
PH UR STRIP.AUTO: 5.5 [PH]
PLATELET # BLD AUTO: 328 X10(3)/MCL (ref 130–400)
PMV BLD AUTO: 9 FL (ref 7.4–10.4)
POTASSIUM SERPL-SCNC: 3.8 MMOL/L (ref 3.5–5.1)
PROT SERPL-MCNC: 7.8 GM/DL (ref 5.8–7.6)
PROT UR QL STRIP.AUTO: NEGATIVE MG/DL
RBC # BLD AUTO: 5 X10(6)/MCL (ref 4.2–5.4)
RBC #/AREA URNS AUTO: ABNORMAL /HPF
RBC UR QL AUTO: NEGATIVE UNIT/L
SODIUM SERPL-SCNC: 137 MMOL/L (ref 136–145)
SP GR UR STRIP.AUTO: 1.02
SQUAMOUS #/AREA URNS LPF: ABNORMAL /HPF
T4 FREE SERPL-MCNC: 0.85 NG/DL (ref 0.7–1.48)
TROPONIN I SERPL-MCNC: <0.01 NG/ML (ref 0–0.04)
TSH SERPL-ACNC: 3.04 UIU/ML (ref 0.35–4.94)
UROBILINOGEN UR STRIP-ACNC: NORMAL MG/DL
WBC # SPEC AUTO: 9.7 X10(3)/MCL (ref 4.5–11.5)
WBC #/AREA URNS AUTO: ABNORMAL /HPF

## 2023-03-16 PROCEDURE — 93005 ELECTROCARDIOGRAM TRACING: CPT

## 2023-03-16 PROCEDURE — 80053 COMPREHEN METABOLIC PANEL: CPT | Performed by: NURSE PRACTITIONER

## 2023-03-16 PROCEDURE — 83690 ASSAY OF LIPASE: CPT | Performed by: NURSE PRACTITIONER

## 2023-03-16 PROCEDURE — 81001 URINALYSIS AUTO W/SCOPE: CPT | Performed by: NURSE PRACTITIONER

## 2023-03-16 PROCEDURE — 85025 COMPLETE CBC W/AUTO DIFF WBC: CPT | Performed by: NURSE PRACTITIONER

## 2023-03-16 PROCEDURE — 99285 EMERGENCY DEPT VISIT HI MDM: CPT | Mod: 25

## 2023-03-16 PROCEDURE — 84439 ASSAY OF FREE THYROXINE: CPT | Performed by: NURSE PRACTITIONER

## 2023-03-16 PROCEDURE — 84484 ASSAY OF TROPONIN QUANT: CPT | Performed by: NURSE PRACTITIONER

## 2023-03-16 PROCEDURE — 84443 ASSAY THYROID STIM HORMONE: CPT | Performed by: NURSE PRACTITIONER

## 2023-03-16 RX ORDER — POLYETHYLENE GLYCOL 3350 17 G/17G
17 POWDER, FOR SOLUTION ORAL DAILY
Qty: 7 EACH | Refills: 0 | Status: SHIPPED | OUTPATIENT
Start: 2023-03-16 | End: 2023-03-23

## 2023-03-16 RX ORDER — DOCUSATE SODIUM 100 MG/1
100 CAPSULE, LIQUID FILLED ORAL 2 TIMES DAILY PRN
Qty: 14 CAPSULE | Refills: 0 | Status: SHIPPED | OUTPATIENT
Start: 2023-03-16 | End: 2023-03-23

## 2023-03-17 NOTE — ED PROVIDER NOTES
Encounter Date: 3/16/2023       History     Chief Complaint   Patient presents with    General Illness     Multiple complaints including abd pain with body pain, pt seen previously for same symptoms     Pt is a 62 y.o. female who presents to the [facility] ED complaining of generalized upper abdominal pain, constipation, episodes of bilateral lower rib pain. Symptoms present for months per pt. Pt seen in the [facility] ED for same issue on 23 and was referred to the [facility] to obtain a PCP for further evaluation. Pt admits missing her clinic appointment. Denies SOB, weakness, dizziness, fever, abdominal pain, cough, centralized chest pain, or loss of bowel or bladder control.     Review of patient's allergies indicates:   Allergen Reactions    Lisinopril Swelling    Penicillins     Sulfa (sulfonamide antibiotics)     Meloxicam Palpitations     Past Medical History:   Diagnosis Date    Diabetes mellitus     Hypertension      Past Surgical History:   Procedure Laterality Date    BACK SURGERY       SECTION       No family history on file.  Social History     Tobacco Use    Smoking status: Every Day    Smokeless tobacco: Never     Review of Systems   Constitutional:  Negative for chills, diaphoresis, fatigue and fever.   HENT:  Negative for facial swelling, rhinorrhea, sinus pressure, sinus pain, sore throat and trouble swallowing.    Respiratory:  Negative for cough, chest tightness, shortness of breath and wheezing.    Cardiovascular:  Positive for chest pain (Bilateral rib pain). Negative for palpitations and leg swelling.   Gastrointestinal:  Positive for abdominal pain and constipation. Negative for diarrhea, nausea and vomiting.   Genitourinary:  Negative for dysuria, flank pain, frequency, hematuria and urgency.   Musculoskeletal:  Negative for arthralgias, back pain, joint swelling and myalgias.   Skin:  Negative for color change and rash.   Neurological:  Negative for dizziness, syncope, weakness and  light-headedness.   Hematological:  Does not bruise/bleed easily.   All other systems reviewed and are negative.    Physical Exam     Initial Vitals [03/16/23 1844]   BP Pulse Resp Temp SpO2   (!) 174/90 80 19 98 °F (36.7 °C) 99 %      MAP       --         Physical Exam    Nursing note and vitals reviewed.  Constitutional: She appears well-developed and well-nourished.   HENT:   Head: Normocephalic and atraumatic.   Nose: Nose normal.   Mouth/Throat: Oropharynx is clear and moist.   Eyes: Conjunctivae and EOM are normal. Pupils are equal, round, and reactive to light.   Neck: Neck supple.   Normal range of motion.  Cardiovascular:  Normal rate, regular rhythm, normal heart sounds and intact distal pulses.           Pulmonary/Chest: Effort normal and breath sounds normal. No respiratory distress. She has no wheezes. She has no rhonchi. She has no rales. She exhibits no tenderness.   Abdominal: Abdomen is soft and flat. Bowel sounds are normal. She exhibits no distension. There is abdominal tenderness in the right upper quadrant and left upper quadrant.     There is no rebound, no guarding, no tenderness at McBurney's point and negative Anderson's sign. negative psoas sign  Musculoskeletal:         General: Normal range of motion.      Cervical back: Normal range of motion and neck supple.     Neurological: She is alert and oriented to person, place, and time. She has normal strength and normal reflexes.   Skin: Skin is warm and dry. Capillary refill takes less than 2 seconds.   Psychiatric: She has a normal mood and affect. Her speech is normal and behavior is normal. Judgment and thought content normal.       ED Course   Procedures  Labs Reviewed   COMPREHENSIVE METABOLIC PANEL - Abnormal; Notable for the following components:       Result Value    Glucose Level 158 (*)     Protein Total 7.8 (*)     Globulin 3.6 (*)     All other components within normal limits   URINALYSIS, REFLEX TO URINE CULTURE - Abnormal; Notable  for the following components:    Bacteria, UA Trace (*)     Squamous Epithelial Cells, UA Trace (*)     Mucous, UA Trace (*)     All other components within normal limits   TSH - Normal   T4, FREE - Normal   LIPASE - Normal   TROPONIN I - Normal   CBC W/ AUTO DIFFERENTIAL    Narrative:     The following orders were created for panel order CBC auto differential.  Procedure                               Abnormality         Status                     ---------                               -----------         ------                     CBC with Differential[[id number]]                            Final result                 Please view results for these tests on the individual orders.   CBC WITH DIFFERENTIAL   EXTRA TUBES    Narrative:     The following orders were created for panel order EXTRA TUBES.  Procedure                               Abnormality         Status                     ---------                               -----------         ------                     Light Blue Top Hold[[id number]]                              In process                 Pink Top Hold[[id number]]                                    In process                   Please view results for these tests on the individual orders.   LIGHT BLUE TOP HOLD   PINK TOP HOLD          Imaging Results              X-Ray Abdomen Flat And Erect (Final result)  Result time 03/16/23 20:33:41      Final result by Ray Tucker MD (03/16/23 20:33:41)                   Impression:      Moderate amount of stool in the colon.      Electronically signed by: Ray Tucker MD  Date:    03/16/2023  Time:    20:33               Narrative:    EXAMINATION:  XR ABDOMEN FLAT AND ERECT    CLINICAL HISTORY:  Constipation, unspecified    TECHNIQUE:  Flat and erect AP views of the abdomen were performed.    COMPARISON:  12/20/2021    FINDINGS:  Bowel gas pattern is unremarkable.  There is a moderate amount of stool in the colon.  No free air is noted.  No nephrolithiasis  is seen.                                       X-Ray Chest PA And Lateral (Final result)  Result time 03/16/23 20:28:53      Final result by Ray Tucker MD (03/16/23 20:28:53)                   Impression:      No acute abnormality.      Electronically signed by: Ray Tucker MD  Date:    03/16/2023  Time:    20:28               Narrative:    EXAMINATION:  XR CHEST PA AND LATERAL    CLINICAL HISTORY:  Chest pain, unspecified    TECHNIQUE:  PA and lateral views of the chest were performed.    COMPARISON:  01/02/2023    FINDINGS:  The lungs are clear, with normal appearance of pulmonary vasculature and no pleural effusion or pneumothorax.    The cardiac silhouette is normal in size. The hilar and mediastinal contours are unremarkable.    Bones are intact.                                       Medications - No data to display  Medical Decision Making:   Differential Diagnosis:   AMI  Constipation  Rib pain  Electrolyte imbalance  Abdominal pain  Anemia  Clinical Tests:   Lab Tests: Ordered and Reviewed  Radiological Study: Ordered and Reviewed  Medical Tests: Ordered and Reviewed  ED Management:  10:23 PM Reassessed patient at this time. Reports condition has improved. Discussed with patient all pertinent ED information and results. Discussed diagnosis and treatment plan with patient. Follow up instructions and return to ED instruction have been given. All questions and concerns were addressed at this time. Patient voices understanding of information and instructions. Patient is comfortable with plan and discharge. Patient is stable for discharge.                           Clinical Impression:   Final diagnoses:  [R07.9] Chest pain  [K59.00] Constipation  [R10.9] Abdominal pain in female patient        ED Disposition Condition    Discharge Stable          ED Prescriptions       Medication Sig Dispense Start Date End Date Auth. Provider    docusate sodium (COLACE) 100 MG capsule Take 1 capsule (100 mg total) by  mouth 2 (two) times daily as needed for Constipation. 14 capsule 3/16/2023 3/23/2023 Vinod Boyce Jr., FNP    polyethylene glycol (GLYCOLAX) 17 gram PwPk Take 17 g by mouth once daily. for 7 days 7 each 3/16/2023 3/23/2023 Vinod Boyce Jr., FNP          Follow-up Information       Follow up With Specialties Details Why Contact Info    [facility] Dept Emergency Medicine In 3 days As needed, If symptoms worsen [address] [facility] [address] [phone]             Vinod Boyce Jr., FNP  03/16/23 8013

## 2023-03-17 NOTE — DISCHARGE INSTRUCTIONS
Follow up with your primary care physician in 3-5 days for follow up evaluation.  Take medication as prescribed.  Increase oral fiber.  Increase oral fluids.

## 2023-03-19 ENCOUNTER — HOSPITAL ENCOUNTER (EMERGENCY)
Facility: HOSPITAL | Age: 63
Discharge: HOME | End: 2023-03-19
Attending: STUDENT IN AN ORGANIZED HEALTH CARE EDUCATION/TRAINING PROGRAM
Payer: MEDICAID

## 2023-03-19 VITALS
TEMPERATURE: 98 F | DIASTOLIC BLOOD PRESSURE: 82 MMHG | BODY MASS INDEX: 27.31 KG/M2 | SYSTOLIC BLOOD PRESSURE: 135 MMHG | RESPIRATION RATE: 18 BRPM | HEIGHT: 64 IN | OXYGEN SATURATION: 97 % | WEIGHT: 160 LBS | HEART RATE: 59 BPM

## 2023-03-19 DIAGNOSIS — R10.13 EPIGASTRIC PAIN: ICD-10-CM

## 2023-03-19 DIAGNOSIS — R07.89 ATYPICAL CHEST PAIN: Primary | ICD-10-CM

## 2023-03-19 LAB
ALBUMIN SERPL-MCNC: 4.1 G/DL (ref 3.4–4.8)
ALBUMIN/GLOB SERPL: 1.3 RATIO (ref 1.1–2)
ALP SERPL-CCNC: 78 UNIT/L (ref 40–150)
ALT SERPL-CCNC: 29 UNIT/L (ref 0–55)
APPEARANCE UR: CLEAR
AST SERPL-CCNC: 19 UNIT/L (ref 5–34)
B-OH-BUTYR SERPL-MCNC: 0.2 MMOL/L
BACTERIA #/AREA URNS AUTO: NORMAL /HPF
BASOPHILS # BLD AUTO: 0.06 X10(3)/MCL (ref 0–0.2)
BASOPHILS NFR BLD AUTO: 0.8 %
BILIRUB UR QL STRIP.AUTO: NEGATIVE MG/DL
BILIRUBIN DIRECT+TOT PNL SERPL-MCNC: 0.7 MG/DL
BUN SERPL-MCNC: 13.1 MG/DL (ref 9.8–20.1)
CALCIUM SERPL-MCNC: 10.1 MG/DL (ref 8.4–10.2)
CHLORIDE SERPL-SCNC: 101 MMOL/L (ref 98–107)
CO2 SERPL-SCNC: 22 MMOL/L (ref 23–31)
COLOR UR AUTO: YELLOW
CREAT SERPL-MCNC: 0.81 MG/DL (ref 0.55–1.02)
EOSINOPHIL # BLD AUTO: 0.09 X10(3)/MCL (ref 0–0.9)
EOSINOPHIL NFR BLD AUTO: 1.3 %
ERYTHROCYTE [DISTWIDTH] IN BLOOD BY AUTOMATED COUNT: 12 % (ref 11.5–17)
GFR SERPLBLD CREATININE-BSD FMLA CKD-EPI: >60 MLS/MIN/1.73/M2
GLOBULIN SER-MCNC: 3.1 GM/DL (ref 2.4–3.5)
GLUCOSE SERPL-MCNC: 224 MG/DL (ref 82–115)
GLUCOSE UR QL STRIP.AUTO: NEGATIVE MG/DL
HCT VFR BLD AUTO: 43.3 % (ref 37–47)
HGB BLD-MCNC: 14.9 G/DL (ref 12–16)
IMM GRANULOCYTES # BLD AUTO: 0.02 X10(3)/MCL (ref 0–0.04)
IMM GRANULOCYTES NFR BLD AUTO: 0.3 %
INR BLD: 0.94 (ref 0–1.3)
KETONES UR QL STRIP.AUTO: NEGATIVE MG/DL
LEUKOCYTE ESTERASE UR QL STRIP.AUTO: NEGATIVE UNIT/L
LIPASE SERPL-CCNC: 25 U/L
LYMPHOCYTES # BLD AUTO: 2.09 X10(3)/MCL (ref 0.6–4.6)
LYMPHOCYTES NFR BLD AUTO: 29.6 %
MAGNESIUM SERPL-MCNC: 2 MG/DL (ref 1.6–2.6)
MCH RBC QN AUTO: 30.2 PG
MCHC RBC AUTO-ENTMCNC: 34.4 G/DL (ref 33–36)
MCV RBC AUTO: 87.7 FL (ref 80–94)
MONOCYTES # BLD AUTO: 0.45 X10(3)/MCL (ref 0.1–1.3)
MONOCYTES NFR BLD AUTO: 6.4 %
NEUTROPHILS # BLD AUTO: 4.35 X10(3)/MCL (ref 2.1–9.2)
NEUTROPHILS NFR BLD AUTO: 61.6 %
NITRITE UR QL STRIP.AUTO: NEGATIVE
NRBC BLD AUTO-RTO: 0 %
PH UR STRIP.AUTO: 6 [PH]
PLATELET # BLD AUTO: 310 X10(3)/MCL (ref 130–400)
PMV BLD AUTO: 8.8 FL (ref 7.4–10.4)
POTASSIUM SERPL-SCNC: 4.6 MMOL/L (ref 3.5–5.1)
PROT SERPL-MCNC: 7.2 GM/DL (ref 5.8–7.6)
PROT UR QL STRIP.AUTO: NEGATIVE MG/DL
PROTHROMBIN TIME: 12.5 SECONDS (ref 12.5–14.5)
RBC # BLD AUTO: 4.94 X10(6)/MCL (ref 4.2–5.4)
RBC #/AREA URNS AUTO: <5 /HPF
RBC UR QL AUTO: NEGATIVE UNIT/L
SODIUM SERPL-SCNC: 135 MMOL/L (ref 136–145)
SP GR UR STRIP.AUTO: 1.03 (ref 1–1.03)
SQUAMOUS #/AREA URNS AUTO: <5 /HPF
TROPONIN I SERPL-MCNC: <0.01 NG/ML (ref 0–0.04)
TROPONIN I SERPL-MCNC: <0.01 NG/ML (ref 0–0.04)
UROBILINOGEN UR STRIP-ACNC: 1 MG/DL
WBC # SPEC AUTO: 7.1 X10(3)/MCL (ref 4.5–11.5)
WBC #/AREA URNS AUTO: <5 /HPF

## 2023-03-19 PROCEDURE — 99285 EMERGENCY DEPT VISIT HI MDM: CPT | Mod: 25

## 2023-03-19 PROCEDURE — 85610 PROTHROMBIN TIME: CPT | Performed by: STUDENT IN AN ORGANIZED HEALTH CARE EDUCATION/TRAINING PROGRAM

## 2023-03-19 PROCEDURE — 83690 ASSAY OF LIPASE: CPT | Performed by: PHYSICIAN ASSISTANT

## 2023-03-19 PROCEDURE — 93010 EKG 12-LEAD: ICD-10-PCS | Mod: ,,, | Performed by: INTERNAL MEDICINE

## 2023-03-19 PROCEDURE — 63600175 PHARM REV CODE 636 W HCPCS: Performed by: PHYSICIAN ASSISTANT

## 2023-03-19 PROCEDURE — 84484 ASSAY OF TROPONIN QUANT: CPT | Performed by: STUDENT IN AN ORGANIZED HEALTH CARE EDUCATION/TRAINING PROGRAM

## 2023-03-19 PROCEDURE — 63600175 PHARM REV CODE 636 W HCPCS

## 2023-03-19 PROCEDURE — 85025 COMPLETE CBC W/AUTO DIFF WBC: CPT | Performed by: PHYSICIAN ASSISTANT

## 2023-03-19 PROCEDURE — 96375 TX/PRO/DX INJ NEW DRUG ADDON: CPT | Mod: 59

## 2023-03-19 PROCEDURE — 25000003 PHARM REV CODE 250: Performed by: STUDENT IN AN ORGANIZED HEALTH CARE EDUCATION/TRAINING PROGRAM

## 2023-03-19 PROCEDURE — 25500020 PHARM REV CODE 255: Performed by: STUDENT IN AN ORGANIZED HEALTH CARE EDUCATION/TRAINING PROGRAM

## 2023-03-19 PROCEDURE — 93010 ELECTROCARDIOGRAM REPORT: CPT | Mod: ,,, | Performed by: INTERNAL MEDICINE

## 2023-03-19 PROCEDURE — 83735 ASSAY OF MAGNESIUM: CPT | Performed by: STUDENT IN AN ORGANIZED HEALTH CARE EDUCATION/TRAINING PROGRAM

## 2023-03-19 PROCEDURE — 81001 URINALYSIS AUTO W/SCOPE: CPT | Performed by: PHYSICIAN ASSISTANT

## 2023-03-19 PROCEDURE — 96374 THER/PROPH/DIAG INJ IV PUSH: CPT

## 2023-03-19 PROCEDURE — 84484 ASSAY OF TROPONIN QUANT: CPT | Performed by: PHYSICIAN ASSISTANT

## 2023-03-19 PROCEDURE — 80053 COMPREHEN METABOLIC PANEL: CPT | Performed by: PHYSICIAN ASSISTANT

## 2023-03-19 PROCEDURE — 63600175 PHARM REV CODE 636 W HCPCS: Performed by: STUDENT IN AN ORGANIZED HEALTH CARE EDUCATION/TRAINING PROGRAM

## 2023-03-19 PROCEDURE — 93005 ELECTROCARDIOGRAM TRACING: CPT

## 2023-03-19 PROCEDURE — 82010 KETONE BODYS QUAN: CPT | Performed by: STUDENT IN AN ORGANIZED HEALTH CARE EDUCATION/TRAINING PROGRAM

## 2023-03-19 RX ORDER — DICYCLOMINE HYDROCHLORIDE 20 MG/1
20 TABLET ORAL 2 TIMES DAILY
Qty: 20 TABLET | Refills: 0 | Status: SHIPPED | OUTPATIENT
Start: 2023-03-19 | End: 2023-04-27 | Stop reason: ALTCHOICE

## 2023-03-19 RX ORDER — LIDOCAINE HYDROCHLORIDE 20 MG/ML
15 SOLUTION OROPHARYNGEAL ONCE
Status: COMPLETED | OUTPATIENT
Start: 2023-03-19 | End: 2023-03-19

## 2023-03-19 RX ORDER — FAMOTIDINE 10 MG/ML
20 INJECTION INTRAVENOUS
Status: COMPLETED | OUTPATIENT
Start: 2023-03-19 | End: 2023-03-19

## 2023-03-19 RX ORDER — ONDANSETRON 2 MG/ML
INJECTION INTRAMUSCULAR; INTRAVENOUS
Status: COMPLETED
Start: 2023-03-19 | End: 2023-03-19

## 2023-03-19 RX ORDER — MORPHINE SULFATE 4 MG/ML
4 INJECTION, SOLUTION INTRAMUSCULAR; INTRAVENOUS
Status: COMPLETED | OUTPATIENT
Start: 2023-03-19 | End: 2023-03-19

## 2023-03-19 RX ORDER — DICYCLOMINE HYDROCHLORIDE 10 MG/1
20 CAPSULE ORAL
Status: COMPLETED | OUTPATIENT
Start: 2023-03-19 | End: 2023-03-19

## 2023-03-19 RX ORDER — PANTOPRAZOLE SODIUM 40 MG/1
40 TABLET, DELAYED RELEASE ORAL DAILY
Qty: 30 TABLET | Refills: 11 | Status: SHIPPED | OUTPATIENT
Start: 2023-03-19 | End: 2023-11-27 | Stop reason: SDUPTHER

## 2023-03-19 RX ORDER — MAG HYDROX/ALUMINUM HYD/SIMETH 200-200-20
30 SUSPENSION, ORAL (FINAL DOSE FORM) ORAL ONCE
Status: COMPLETED | OUTPATIENT
Start: 2023-03-19 | End: 2023-03-19

## 2023-03-19 RX ORDER — ONDANSETRON 2 MG/ML
4 INJECTION INTRAMUSCULAR; INTRAVENOUS
Status: COMPLETED | OUTPATIENT
Start: 2023-03-19 | End: 2023-03-19

## 2023-03-19 RX ORDER — SUCRALFATE 1 G/10ML
1 SUSPENSION ORAL 4 TIMES DAILY
Qty: 400 ML | Refills: 0 | Status: SHIPPED | OUTPATIENT
Start: 2023-03-19 | End: 2023-04-27 | Stop reason: ALTCHOICE

## 2023-03-19 RX ADMIN — DICYCLOMINE HYDROCHLORIDE 20 MG: 10 CAPSULE ORAL at 02:03

## 2023-03-19 RX ADMIN — ONDANSETRON: 2 INJECTION INTRAMUSCULAR; INTRAVENOUS at 11:03

## 2023-03-19 RX ADMIN — FAMOTIDINE 20 MG: 10 INJECTION, SOLUTION INTRAVENOUS at 12:03

## 2023-03-19 RX ADMIN — MORPHINE SULFATE 4 MG: 4 INJECTION INTRAVENOUS at 10:03

## 2023-03-19 RX ADMIN — ALUMINUM HYDROXIDE, MAGNESIUM HYDROXIDE, AND SIMETHICONE 30 ML: 200; 200; 20 SUSPENSION ORAL at 01:03

## 2023-03-19 RX ADMIN — IOPAMIDOL 100 ML: 755 INJECTION, SOLUTION INTRAVENOUS at 10:03

## 2023-03-19 RX ADMIN — LIDOCAINE HYDROCHLORIDE 15 ML: 20 SOLUTION ORAL at 01:03

## 2023-03-19 RX ADMIN — ONDANSETRON 4 MG: 2 INJECTION INTRAMUSCULAR; INTRAVENOUS at 09:03

## 2023-03-19 RX ADMIN — SODIUM CHLORIDE, POTASSIUM CHLORIDE, SODIUM LACTATE AND CALCIUM CHLORIDE 1000 ML: 600; 310; 30; 20 INJECTION, SOLUTION INTRAVENOUS at 09:03

## 2023-03-19 NOTE — ED PROVIDER NOTES
"Encounter Date: 3/19/2023    SCRIBE #1 NOTE: I, Preet Barron, am scribing for, and in the presence of,  Jay García MD. I have scribed the following portions of the note - Other sections scribed: HPI, ROS, PE, MDM.     History     Chief Complaint   Patient presents with    Abdominal Pain     POV with RLQ abd pain, N/V, constipation x6 days. Dec appetite, lost +20lb in two months. CP present, radiates into back     63 y/o female with a history of HTN and DM presents to the ED with upper abdominal pain onset 3 months ago and worse over this weekend. Pt also complains of nausea, vomiting, constipation, and a burning chest pain that radiates into her back. She states that "it feels like her chest is on fire." Visited [facility] ED on 3/16/23 where she had an X ray of abdomen and chest, was discharged with laxatives with no relief. Pt notes she has not taken her diabetes medication in 4-5 days. Notes trouble swallowing, shoulder pain, decreased appetite, and dizziness.    The history is provided by the patient and a relative. No  was used.   Abdominal Pain  Illness onset: 3 months ago, worse this weekend. The problem has been gradually worsening. Pain location: upper. The other symptoms of the illness include nausea and vomiting. The other symptoms of the illness do not include fever, shortness of breath or dysuria.   The patient has had a change in bowel habit. Additional symptoms associated with the illness include constipation and back pain. Symptoms associated with the illness do not include chills or hematuria. Significant associated medical issues include diabetes.   Review of patient's allergies indicates:   Allergen Reactions    Lisinopril Swelling    Penicillins     Sulfa (sulfonamide antibiotics)     Meloxicam Palpitations     Past Medical History:   Diagnosis Date    Diabetes mellitus     Hypertension      Past Surgical History:   Procedure Laterality Date    BACK SURGERY       " SECTION       No family history on file.  Social History     Tobacco Use    Smoking status: Every Day    Smokeless tobacco: Never     Review of Systems   Constitutional:  Positive for appetite change. Negative for chills and fever.   HENT:  Positive for trouble swallowing. Negative for congestion, drooling and sore throat.    Eyes:  Negative for pain and visual disturbance.   Respiratory:  Negative for chest tightness, shortness of breath and wheezing.    Cardiovascular:  Positive for chest pain. Negative for palpitations and leg swelling.   Gastrointestinal:  Positive for abdominal pain, constipation, nausea and vomiting.   Genitourinary:  Negative for dysuria and hematuria.   Musculoskeletal:  Positive for arthralgias and back pain. Negative for neck pain and neck stiffness.   Skin:  Negative for pallor and rash.   Neurological:  Positive for dizziness. Negative for weakness and numbness.   Hematological:  Does not bruise/bleed easily.     Physical Exam     Initial Vitals [03/19/23 0940]   BP Pulse Resp Temp SpO2   (!) 151/84 73 18 98 °F (36.7 °C) 95 %      MAP       --         Physical Exam    Nursing note and vitals reviewed.  Constitutional: She appears well-developed and well-nourished. She is not diaphoretic. No distress.   HENT:   Head: Normocephalic and atraumatic.   Nose: Nose normal.   Mouth/Throat: Oropharynx is clear and moist. Mucous membranes are dry.   Eyes: EOM are normal. Pupils are equal, round, and reactive to light.   Neck: Neck supple.   Normal range of motion.  Cardiovascular:  Normal rate and regular rhythm.           No murmur heard.  Pulmonary/Chest: Breath sounds normal. No respiratory distress. She has no wheezes. She has no rales.   Abdominal: Abdomen is soft. She exhibits no distension. There is generalized abdominal tenderness.   Musculoskeletal:      Cervical back: Normal range of motion and neck supple.     Neurological: She is alert and oriented to person, place, and time. She has  normal strength. No cranial nerve deficit or sensory deficit.   Skin: Skin is warm. Capillary refill takes less than 2 seconds. No rash noted.       ED Course   Procedures  Labs Reviewed   COMPREHENSIVE METABOLIC PANEL - Abnormal; Notable for the following components:       Result Value    Sodium Level 135 (*)     Carbon Dioxide 22 (*)     Glucose Level 224 (*)     All other components within normal limits   URINALYSIS, REFLEX TO URINE CULTURE - Abnormal; Notable for the following components:    Specific Gravity, UA 1.033 (*)     All other components within normal limits   LIPASE - Normal   TROPONIN I - Normal   MAGNESIUM - Normal   PROTIME-INR - Normal   TROPONIN I - Normal   BETA - HYDROXYBUTYRATE, SERUM - Normal   URINALYSIS, MICROSCOPIC - Normal   CBC W/ AUTO DIFFERENTIAL    Narrative:     The following orders were created for panel order CBC auto differential.  Procedure                               Abnormality         Status                     ---------                               -----------         ------                     CBC with Differential[[id number]]                            Final result                 Please view results for these tests on the individual orders.   CBC WITH DIFFERENTIAL     EKG Readings: (Independently Interpreted)   EKG independently interpreted by me.  EKG: NSR @ 71, no STEMI, QTc 419   ECG Results              EKG 12-lead (Final result)  Result time 03/19/23 14:00:58      Final result by Interface, Lab In [facility] (03/19/23 14:00:58)                   Narrative:    Test Reason : R10.13,    Vent. Rate : 071 BPM     Atrial Rate : 071 BPM     P-R Int : 142 ms          QRS Dur : 078 ms      QT Int : 386 ms       P-R-T Axes : 049 073 036 degrees     QTc Int : 419 ms    Normal sinus rhythm  Normal ECG  When compared with ECG of 16-MAR-2023 22:40,  No significant change was found  Confirmed by Derrell Canada MD ([id number]) on 3/19/2023 2:00:47 PM    Referred By:             Confirmed  By:Derrell Canada MD                                  Imaging Results              X-Ray Chest 1 View (Final result)  Result time 03/19/23 11:36:05      Final result by Johnnie Zarate MD (03/19/23 11:36:05)                   Impression:      No acute abnormality.      Electronically signed by: Johnnie Zarate  Date:    03/19/2023  Time:    11:36               Narrative:    EXAMINATION:  XR CHEST 1 VIEW    CLINICAL HISTORY:  Chest pain, unspecified    TECHNIQUE:  Single frontal view of the chest was performed.    COMPARISON:  03/16/2023    FINDINGS:  The lungs are clear, with normal appearance of pulmonary vasculature and no pleural effusion or pneumothorax.    The cardiac silhouette is normal in size. The hilar and mediastinal contours are unremarkable.    Bones are intact.                                       CT Chest Abdomen Pelvis With Contrast (xpd) (Final result)  Result time 03/19/23 11:05:33      Final result by Isabel Shaw MD (03/19/23 11:05:33)                   Impression:      1. Hepatomegaly with steatosis.  2. No acute cardiopulmonary abnormality  3. Mild atherosclerotic narrowing of the proximal SMA.      Electronically signed by: Isabel Shaw  Date:    03/19/2023  Time:    11:05               Narrative:    EXAMINATION:  CT CHEST ABDOMEN PELVIS WITH CONTRAST (XPD)    CLINICAL HISTORY:  Weight loss, unintended;    TECHNIQUE:  Helical acquisition with axial, sagittal and coronal reformations obtained from the thoracic inlet to the pubic symphysis following the IV administration of contrast.    Automated tube current modulation, weight-based exposure dosing, and/or iterative reconstruction technique utilized to reach lowest reasonably achievable exposure rate.    DLP: 1020 mGy*cm    COMPARISON:  CT abdomen pelvis 07/19/2022, CT chest 10/15/2019, CT abdomen pelvis 05/31/2016    FINDINGS:  BASE OF NECK: Nodule at the left lobe of the thyroid has been present since at least 2019 exam.   This can be evaluated with outpatient sonogram if not previously performed.    HEART: Normal size. No effusion.    THORACIC VASCULATURE: There are calcifications at the aortic root.  Ascending aorta measures 3.3 cm in diameter.  Descending aorta measures 2.5 cm in diameter..Pulmonary arteries enhance normally.    ARGELIA/MEDIASTINUM: No enlarged lymph nodes by size criteria.  Questionable mild esophageal wall thickening.    AIRWAYS: Patent.    LUNGS/PLEURA: Clear lungs. No pleural effusion or thickening.    THORACIC SOFT TISSUES: Unremarkable.    LIVER: The liver is hypodense.  Probable focal fatty sparing adjacent to the gallbladder fossa.  Liver measures 18 cm craniocaudal at the midclavicular line.    BILIARY: No calcified gallstones.  Common hepatic duct measures 7 mm.  Similar to prior.    PANCREAS: No inflammatory change.    SPLEEN: Normal in size    ADRENALS: No mass.    KIDNEYS/URETERS: The kidneys enhance symmetrically.  No hydronephrosis.    GI TRACT/MESENTERY:  No evidence of bowel obstruction or inflammation. Appendix measures 5 mm, within normal limits.    PERITONEUM: No free fluid.No free air.    LYMPH NODES: No enlarged lymph nodes by size criteria.    ABDOMINOPELVIC VASCULATURE: Mild aortoiliac atherosclerosis.  Mild atherosclerotic narrowing of the proximal SMA.  Celiac and TIM are patent.  Reflux into the left gonadal vein.    BLADDER: Unremarkable.    REPRODUCTIVE ORGANS: Enhancing myometrial lesion has been present since at least 2016, most compatible with fibroid.    ABDOMINAL WALL: Unremarkable.    BONES: Lower lumbar facet arthropathy.  Postop ACDF.                                       Medications   lactated ringers bolus 1,000 mL (0 mLs Intravenous Stopped 3/19/23 1045)   ondansetron injection 4 mg (4 mg Intravenous Given 3/19/23 0945)   morphine injection 4 mg (4 mg Intravenous Given 3/19/23 1045)   iopamidoL (ISOVUE-370) injection 100 mL (100 mLs Intravenous Given 3/19/23 1053)   ondansetron  4 mg/2 mL injection (  Given 3/19/23 1130)   aluminum-magnesium hydroxide-simethicone 200-200-20 mg/5 mL suspension 30 mL (30 mLs Oral Given 3/19/23 1345)     And   LIDOcaine HCl 2% oral solution 15 mL (15 mLs Oral Given 3/19/23 1345)   famotidine (PF) injection 20 mg (20 mg Intravenous Given 3/19/23 1245)   dicyclomine capsule 20 mg (20 mg Oral Given 3/19/23 1441)     Medical Decision Making:   Clinical Tests:   Lab Tests: Ordered and Reviewed  Radiological Study: Ordered and Reviewed    Medical Decision Making  [unfilled]    Differential diagnosis (includes but is not limited to):   ACS, arrhythmia, electrolyte abnormalities, gastritis, gastroenteritis, appendicitis, colitis, cystitis, pyelonephritis, SBO, ileus, constipation, pancreatitis, vascular disease, dissection    MDM Narrative  62-year-old female presents with multiple complaints including chest discomfort, abdominal pain, nausea and vomiting, decreased appetite, constipation, among others.  EKG reviewed with no STEMI criteria.  Chest x-ray is pending.  Labs including troponin pending.  IV fluid rehydration ordered.  Pain and nausea control as needed.  Continue pulse oximetry and telemetry monitoring.  Telemetry monitor independently reviewed and does show a sinus rhythm with heart rate in the 50s.  Given the patient's persistent symptoms under steadily worsening with concerning history, will proceed CT examination to rule out PE/dissection/other acute pathology.    Update:  Labs reviewed.  No evidence of ketoacidosis given the patient's reported history of SGOT to use in the past.  CT unremarkable for acute pathology, does show some mild atherosclerotic changes and hepatomegaly.  I have discussed these findings with the patient.  Patient is feeling much better after medications given in the emergency department.  She reports significant improvement after the GI cocktail.  She is ambulatory at her baseline with a steady gait and is tolerating oral intake  well.  Patient has had multiple negative troponins in the emergency department.  I did discuss admission to the hospital for observation for further evaluation and management.  However, patient states he feels much better and is ready for discharge home and will follow-up as an outpatient.  Patient is to follow up with the primary care physician for further evaluation and management.  Referral to Gastroenterology and Cardiology discussed with the patient, she states she will follow-up with both services has been discussed.  Prescriptions for symptom control provided.  She is also requested refill of her Jardiance to bridge her to her next primary care provider appointment, I have provided a short-term prescription with strict precautions.  Strict return precautions have been discussed the patient has verbalized understanding.    Dispo:  Discharge    My independent radiology interpretation:  See above  Point of care US (independently performed and interpreted):   Decision rules/clinical scoring:  See above    Amount and/or Complexity of Data Reviewed  Independent historian: daughter    Summary of history:  History corroborated by the patient's daughter who is present at the bedside, stating these symptoms have been going off and on for the past several weeks  External data reviewed: notes from previous admissions, notes from previous ED visits, notes from clinic visits, prior imaging, and prescription medications   Summary of data reviewed:  Prior notes reviewed in the electronic medical record as above  Risk and benefits of testing: discussed   Labs: ordered and reviewed  Radiology: ordered and independent interpretation performed (see above or ED course)  ECG/medicine tests: ordered and independent interpretation performed (see above or ED course)  Discussion of management or test interpretation with external provider(s): none   Summary of discussion:     Risk  OTC medications  Prescription drug management    Parenteral controlled substances   Drug therapy requiring intense monitoring for toxicity   Decision regarding hospitalization  Shared decision making     Critical Care  none    Data Reviewed/Counseling: I have personally reviewed the patient's vital signs, nursing notes, and other relevant tests, information, and imaging. I had a detailed discussion regarding the historical points, exam findings, and any diagnostic results supporting the discharge diagnosis. I personally performed the history, PE, MDM and procedures as documented above and agree with the scribe's documentation.    Portions of this note were dictated using voice recognition software. Although it was reviewed for accuracy, some inherent voice recognition errors may have occurred and may be present in this document.       Problems Addressed:  Atypical chest pain: acute illness or injury with systemic symptoms that poses a threat to life or bodily functions  Epigastric pain: acute illness or injury    Amount and/or Complexity of Data Reviewed  Labs: ordered. Decision-making details documented in ED Course.  Radiology: ordered. Decision-making details documented in ED Course.       Additional MDM:   Heart Score:    History:          Slightly suspicious.  ECG:             Normal  Age:               45-65 years  Risk factors: 1-2 risk factors  Troponin:       Less than or equal to normal limit  Final Score: 2       Scribe Attestation:   Scribe #1: I performed the above scribed service and the documentation accurately describes the services I performed. I attest to the accuracy of the note.    Attending Attestation:           Physician Attestation for Scribe:  Physician Attestation Statement for Scribe #1: I, Jay García MD, reviewed documentation, as scribed by Preet Barron in my presence, and it is both accurate and complete.           ED Course as of 03/23/23 1504   Sun Mar 19, 2023   1147 X-Ray Chest 1 View  Independently visualized/reviewed  by me during the ED visit.  - No lobar consolidation, no PTX [MC]   1637 Patient states she is ready for discharge home as she is feeling much better.  She is tolerating oral intake well.  I discussed waiting for the results of her urinalysis and serum ketone testing due to her ST LT to use, however patient states she feels much better and wishes to be discharged now without waiting for her results.  We will contact her with her results if abnormal.  Patient is following up with her PCP this week.  GI referral provided.  Patient is ambulatory at her baseline with a steady gait. [MC]      ED Course User Index  [MC] Jay García MD                 Clinical Impression:   Final diagnoses:  [R10.13] Epigastric pain  [R07.89] Atypical chest pain (Primary)        ED Disposition Condition    Discharge Stable          ED Prescriptions       Medication Sig Dispense Start Date End Date Auth. Provider    pantoprazole (PROTONIX) 40 MG tablet Take 1 tablet (40 mg total) by mouth once daily. 30 tablet 3/19/2023 3/18/2024 Jay García MD    sucralfate (CARAFATE) 100 mg/mL suspension Take 10 mLs (1 g total) by mouth 4 (four) times daily. for 10 days 400 mL 3/19/2023 3/29/2023 Jay García MD    dicyclomine (BENTYL) 20 mg tablet Take 1 tablet (20 mg total) by mouth 2 (two) times daily. for 10 days 20 tablet 3/19/2023 3/29/2023 Jay García MD    empagliflozin (JARDIANCE) 10 mg tablet Take 1 tablet (10 mg total) by mouth Daily. for 10 days 10 tablet 3/19/2023 3/29/2023 Jay Garcaí MD          Follow-up Information       Follow up With Specialties Details Why Contact Info    Your PCP  Call today      [facility]    [address] [address] [phone]      [facility] - Emergency Dept Emergency Medicine  If symptoms worsen [address] [facility] [address] [phone]             Jay García MD  03/23/23 6871

## 2023-03-19 NOTE — FIRST PROVIDER EVALUATION
"Medical screening examination initiated.  I have conducted a focused provider triage encounter, findings are as follows:    Brief history of present illness:  63 yo female presents to ED for evaluation of upper abdominal pain radiating into her chest worsening over the past week. Complains of nausea and vomiting. Reports constipation, LBM 6 days ago. Seen at [facility] ED on 3/16/23 where sent home with meds for constipation with no relief.     Vitals:    03/19/23 0940   BP: (!) 151/84   Pulse: 73   Resp: 18   Temp: 98 °F (36.7 °C)   TempSrc: Oral   SpO2: 95%   Weight: 72.6 kg (160 lb)   Height: 5' 4" (1.626 m)       Pertinent physical exam:  Patient is awake and alert and oriented.  Ambulatory to triage.  In no acute distress.      Brief workup plan:  labs, UA, EKG, CXR    Preliminary workup initiated; this workup will be continued and followed by the physician or advanced practice provider that is assigned to the patient when roomed.  "

## 2023-03-19 NOTE — DISCHARGE INSTRUCTIONS
Please follow up with your primary care physician in 2-3 days.      Your visit in the emergency department is NOT definitive care - please follow-up with your primary care doctor and/or specialist within 1-2 days.  Please return if you have any worsening in your condition or if you have any other concerns.    If you had radiology exams like an XRAY or CT in the emergency Department the interpretation on them may be preliminary - there may be less time sensitive findings on the reports. Please obtain these reports within 24 hours from the hospital or by using the [facility] for the portal on your mobile phone to access records.  Bring these to your primary care doctor and/or specialist for further review of incidental findings.    Please review any LAB WORK from your visit today with your primary care physician.    Please return to the emergency department if you develop any worsening symptoms, fever, chest pain, difficulty breathing, or any other new symptoms or concerns.      Thank you for allowing us to take care of you today.

## 2023-03-19 NOTE — Clinical Note
"Janet Ramirez" Antonio was seen and treated in our emergency department on 3/19/2023.  She may return to work on 03/21/2023.       If you have any questions or concerns, please don't hesitate to call.      Filiberto CALVILLO RN    "

## 2023-03-21 ENCOUNTER — PATIENT OUTREACH (OUTPATIENT)
Dept: EMERGENCY MEDICINE | Facility: HOSPITAL | Age: 63
End: 2023-03-21
Payer: MEDICAID

## 2023-03-21 NOTE — PROGRESS NOTES
Spoke with patient,says she is feeling better after relieving her bowels. Reports she still has a little nauseated but drinking Gatorade. Stressed the importance of getting establish with a PCP for better management of her diabetes and high blood pressure. Says she is monitoring her BS at home but not her B/P. Informed patient I would contact the [facility] to reach out to her with an appointment to establish care with a doctor. Patient made aware that she previously had  several PCP appointments scheduled but failed to show for appointments. Discussed with patient the ED is not the resource for filling Rxs. Denies any issues with housing,utilities,or transportation.. Agreed to [facility] navigation.Advised to utilized urgent care for non urgent issues. Voices understanding.

## 2023-03-21 NOTE — PATIENT INSTRUCTIONS
Why Should I Have My Own Doctor or Nurse Practitioner (PCP) to Take Care of Me  What is a PCP (Primary Care Provider)?    A primary care provider is a doctor or nurse practitioner who you can call for an appointment and will see you when you are sick.    You will also be seen at scheduled appointment times during the year to check on your diabetes, or high blood pressure, or heart disease.    Why see the same PCP (doctor/nurse practitioner)?    You can be seen faster when you are sick           You, the PCP (doctor/nurse practitioner) and the office staff get to know each other; you begin to trust them to care for you. You take part in your health choices.   All of you together are a team.    Your medicine is looked at every time you visit, to be sure you are taking the medicine, as the PCP (doctor/nurse practitioner) ordered.    Your PCP (doctor/nurse practitioner) and their staff help keep you healthy and out of the hospital.  They can catch sicknesses earlier by ordering tests once a year to stop or prevent the sickness from getting worse.      Your PCP (doctor/nurse practitioner) can send you to providers who specialize (heart/bone/lung) if you need.  They and their office staff help keep track of your seeing other providers (doctors/nurse practitioners) and tests (CT/ MRIs/ X Rays)) taken.        PCPs want you to stay healthy.  Let us care for you.              What Do I Do If I Wake Up Sick                                                     If you wake up sick, or you start to fill sick during the day, try these tips to get care and start to feel better soon.                                                                                                                              As soon as you start to feel bad, call your doctor's office and ask for same day or next day visit appointment.        If you cannot be seen with your doctor's office within 24 hours, you can go to an urgent care and be  seen.          How to stay well:     Take your medicine as ordered by your doctor      Fill your Medicine before you run out      Exercise       Enjoy walking in the sunlight daily  Keep your scheduled clinic appointments      Keep you scheduled yearly wellness visits   DASH Meal Plan  (Healthy eating plan)    Why use this meal plan?    This healthy meal plan lowers blood pressure.  This meal plan lowers the chance of you getting Diabetes, heart disease and stroke.  This meal plan also helps you lose weight.    DASH balanced meal plannin or more servings of fruits and vegetables every day.  At each meal, try to fill half of your plated with fruits and vegetables                    Eat 6-8 servings of whole grains every day.  Whole grains are:  Brown rice, oatmeal, whole wheat bread, whole grain, low salt cereals, Donna bread, whole wheat flour tortillas                5 ounces of meat, chicken, or fish each day (3-ounce size of serving of meat is the same size as a deck of playing cards)  Fish like sardines, salmon and mackerel are also good for your heart.            2 servings of low-fat dairy each day (Milk, cottage cheese, yogurt)          Do Not:  Use More than 1,500 milligrams of salt a day if you have high blood pressure (2/3 teaspoon)    You would look at labels and total milligrams of salt per day        Do Not Add salt when cooking      Do Not Salt food before eating    Do Not Eat fried foods  Do Not Cook using butter, stick margarine, lard, shortening, coconut oil    Do Not Buy regular canned vegetables, pickles, or olives (too much salt; use low salt or no salt)  Do Not Do not buy premade or frozen meals    Do Not Eat fast food/buffet           Speak with your Doctor/Nurse practitioner about exercising 30 minutes a day          Modified from DASH eating plan education

## 2023-04-26 ENCOUNTER — PATIENT OUTREACH (OUTPATIENT)
Dept: EMERGENCY MEDICINE | Facility: HOSPITAL | Age: 63
End: 2023-04-26
Payer: MEDICAID

## 2023-04-26 NOTE — PROGRESS NOTES
Patient returned phone call confirming she is going to her PCP appointment tomorrow 04/27/23 to establish care. Says she is checking her BS at home but ran out of Rx  medications 2 weeks ago. She also stated her left leg has slight numbness. Advised her to discuss all of  her concerns and issues with PCP at appointment. Explained again why it is important to have a provider she can see on a regular basis to address all of her issues and prescribe the recommended treatment or sent referrals. Informed once she is established with a PCP all of her Rx needs and refills will be done thru her PCP. Encouraged to utilized urgent care for non acute issues instead of going to the ED. Voices understanding.

## 2023-04-27 ENCOUNTER — OFFICE VISIT (OUTPATIENT)
Dept: INTERNAL MEDICINE | Facility: CLINIC | Age: 63
End: 2023-04-27
Payer: MEDICAID

## 2023-04-27 VITALS
TEMPERATURE: 98 F | BODY MASS INDEX: 28.14 KG/M2 | HEART RATE: 65 BPM | HEIGHT: 64 IN | SYSTOLIC BLOOD PRESSURE: 145 MMHG | WEIGHT: 164.81 LBS | DIASTOLIC BLOOD PRESSURE: 69 MMHG | RESPIRATION RATE: 18 BRPM

## 2023-04-27 DIAGNOSIS — F41.8 MIXED ANXIETY AND DEPRESSIVE DISORDER: Chronic | ICD-10-CM

## 2023-04-27 DIAGNOSIS — E11.9 TYPE 2 DIABETES MELLITUS WITHOUT COMPLICATION, WITHOUT LONG-TERM CURRENT USE OF INSULIN: Primary | ICD-10-CM

## 2023-04-27 DIAGNOSIS — I10 PRIMARY HYPERTENSION: Chronic | ICD-10-CM

## 2023-04-27 DIAGNOSIS — Z76.89 ENCOUNTER TO ESTABLISH CARE: ICD-10-CM

## 2023-04-27 DIAGNOSIS — E53.8 B12 DEFICIENCY: ICD-10-CM

## 2023-04-27 DIAGNOSIS — F17.200 TOBACCO DEPENDENCE: Chronic | ICD-10-CM

## 2023-04-27 DIAGNOSIS — K76.0 HEPATIC STEATOSIS: Chronic | ICD-10-CM

## 2023-04-27 LAB — HBA1C MFR BLD: 7.3 %

## 2023-04-27 PROCEDURE — 1160F RVW MEDS BY RX/DR IN RCRD: CPT | Mod: CPTII,,, | Performed by: NURSE PRACTITIONER

## 2023-04-27 PROCEDURE — 3008F BODY MASS INDEX DOCD: CPT | Mod: CPTII,,, | Performed by: NURSE PRACTITIONER

## 2023-04-27 PROCEDURE — 99214 OFFICE O/P EST MOD 30 MIN: CPT | Mod: PBBFAC | Performed by: NURSE PRACTITIONER

## 2023-04-27 PROCEDURE — 4010F PR ACE/ARB THEARPY RXD/TAKEN: ICD-10-PCS | Mod: CPTII,,, | Performed by: NURSE PRACTITIONER

## 2023-04-27 PROCEDURE — 99406 PR TOBACCO USE CESSATION INTERMEDIATE 3-10 MINUTES: ICD-10-PCS | Mod: S$PBB,,, | Performed by: NURSE PRACTITIONER

## 2023-04-27 PROCEDURE — 3078F DIAST BP <80 MM HG: CPT | Mod: CPTII,,, | Performed by: NURSE PRACTITIONER

## 2023-04-27 PROCEDURE — 83036 HEMOGLOBIN GLYCOSYLATED A1C: CPT | Mod: PBBFAC | Performed by: NURSE PRACTITIONER

## 2023-04-27 PROCEDURE — 3051F PR MOST RECENT HEMOGLOBIN A1C LEVEL 7.0 - < 8.0%: ICD-10-PCS | Mod: CPTII,,, | Performed by: NURSE PRACTITIONER

## 2023-04-27 PROCEDURE — 3051F HG A1C>EQUAL 7.0%<8.0%: CPT | Mod: CPTII,,, | Performed by: NURSE PRACTITIONER

## 2023-04-27 PROCEDURE — 1160F PR REVIEW ALL MEDS BY PRESCRIBER/CLIN PHARMACIST DOCUMENTED: ICD-10-PCS | Mod: CPTII,,, | Performed by: NURSE PRACTITIONER

## 2023-04-27 PROCEDURE — 3078F PR MOST RECENT DIASTOLIC BLOOD PRESSURE < 80 MM HG: ICD-10-PCS | Mod: CPTII,,, | Performed by: NURSE PRACTITIONER

## 2023-04-27 PROCEDURE — 4010F ACE/ARB THERAPY RXD/TAKEN: CPT | Mod: CPTII,,, | Performed by: NURSE PRACTITIONER

## 2023-04-27 PROCEDURE — 99214 PR OFFICE/OUTPT VISIT, EST, LEVL IV, 30-39 MIN: ICD-10-PCS | Mod: 25,S$PBB,, | Performed by: NURSE PRACTITIONER

## 2023-04-27 PROCEDURE — 99406 BEHAV CHNG SMOKING 3-10 MIN: CPT | Mod: S$PBB,,, | Performed by: NURSE PRACTITIONER

## 2023-04-27 PROCEDURE — 3077F PR MOST RECENT SYSTOLIC BLOOD PRESSURE >= 140 MM HG: ICD-10-PCS | Mod: CPTII,,, | Performed by: NURSE PRACTITIONER

## 2023-04-27 PROCEDURE — 1159F PR MEDICATION LIST DOCUMENTED IN MEDICAL RECORD: ICD-10-PCS | Mod: CPTII,,, | Performed by: NURSE PRACTITIONER

## 2023-04-27 PROCEDURE — 99214 OFFICE O/P EST MOD 30 MIN: CPT | Mod: 25,S$PBB,, | Performed by: NURSE PRACTITIONER

## 2023-04-27 PROCEDURE — 3008F PR BODY MASS INDEX (BMI) DOCUMENTED: ICD-10-PCS | Mod: CPTII,,, | Performed by: NURSE PRACTITIONER

## 2023-04-27 PROCEDURE — 3077F SYST BP >= 140 MM HG: CPT | Mod: CPTII,,, | Performed by: NURSE PRACTITIONER

## 2023-04-27 PROCEDURE — 1159F MED LIST DOCD IN RCRD: CPT | Mod: CPTII,,, | Performed by: NURSE PRACTITIONER

## 2023-04-27 RX ORDER — CYANOCOBALAMIN 1000 UG/ML
1000 INJECTION, SOLUTION INTRAMUSCULAR; SUBCUTANEOUS
Qty: 10 ML | Refills: 1 | Status: SHIPPED | OUTPATIENT
Start: 2023-04-27 | End: 2023-09-27

## 2023-04-27 RX ORDER — CETIRIZINE HYDROCHLORIDE 10 MG/1
10 TABLET ORAL DAILY
Qty: 14 TABLET | Refills: 0 | Status: SHIPPED | OUTPATIENT
Start: 2023-04-27 | End: 2023-11-27

## 2023-04-27 RX ORDER — VALSARTAN 80 MG/1
160 TABLET ORAL DAILY
Qty: 60 TABLET | Refills: 1 | Status: SHIPPED | OUTPATIENT
Start: 2023-04-27 | End: 2023-06-08 | Stop reason: DRUGHIGH

## 2023-04-27 NOTE — PROGRESS NOTES
Juju Oviedo, RACHEAL   [facility]  [address] [facility] [address]      PATIENT NAME: Janet Alvarez  : 1960  DATE: 23  MRN: [id number]      Reason for Visit / Chief Complaint: Establish Care (Needs refills, out of blood glucose meds x 1 month, LLE numbness)       History of Present Illness / Problem Focused Workflow     Janet Alvarez is a 62 y.o. White female presents to the clinic to establish PCP in [facility]; formerly seeing [facility] for primary care. PMH DM, HTN, HLD, anxiety, silicone breast implants, Vit B12 deficiency, hepatic steatosis, hx thyroid bx , +HPV on last pap, and abdominoplasty.     Today, reports has been out of DM and HTN meds x 1 month. Daughter, Marco, present during visit. Attempts to follow ADA diet. BP elevated today. Reports ongoing anxiety with some associated depression d/t moving, son just left for , car is broken and works at  facility. Is fasting today. Has been on multiple SSRI's in the past with associated SI and benadryl cause hyperactivity. Quit smoking in , onset age 16 but has quit during pregnancies x 6 and one year postpartum. Declines vaccines today. Last saw GYN in  and last [facility] in 2020. Denies chest pain, shortness of breath, cough, fever, headache, dizziness, weakness, abdominal pain, nausea, vomiting, diarrhea, constipation, dysuria, SI/HI.        Review of Systems     Review of Systems     See HPI for details    Constitutional: Denies Change in appetite. Denies Chills. Denies Fever. Denies Night sweats.  Eye: Denies Blurred vision. Denies Discharge. Denies Eye pain.  ENT: Denies Decreased hearing. Denies Sore throat. Denies Swollen glands.  Respiratory: Denies Cough. Denies Shortness of breath. Denies Shortness of breath with exertion. Denies Wheezing.  Cardiovascular: Denies Chest pain at rest. Denies Chest pain with exertion. Denies Irregular  heartbeat. Denies Palpitations. Denies Edema.  Gastrointestinal: Denies Abdominal pain. Denies Diarrhea. Denies Nausea. Denies Vomiting. Denies Hematemesis or Hematochezia.  Genitourinary: Denies Dysuria. Denies Urinary frequency. Denies Urinary urgency. Denies Blood in urine.  Endocrine: Denies Cold intolerance. Denies Excessive thirst. Denies Heat intolerance. Denies Weight loss. Denies Weight gain.  Musculoskeletal: Denies Painful joints. Denies Weakness.  Integumentary: Denies Rash. Denies Itching. Denies Dry skin.  Neurologic: Denies Dizziness. Denies Fainting. Denies Headache.  Psychiatric: Admits Depression. Admits Anxiety. Denies Suicidal/Homicidal ideations.    All Other ROS: Negative except as stated in HPI.     Medical / Surgical / Social / Family History       ----------------------------  Diabetes mellitus  Hypertension     Past Surgical History:   Procedure Laterality Date    ABDOMINOPLASTY  2009    CERVICAL FUSION  2015     SECTION      COLONOSCOPY  2016    REMOVAL OF BREAST IMPLANT  2016       Social History     Socioeconomic History    Marital status:    Tobacco Use    Smoking status: Former     Types: Cigarettes     Quit date: 2023     Years since quittin.3    Smokeless tobacco: Never   Substance and Sexual Activity    Alcohol use: Never    Drug use: Not Currently     Types: Marijuana     Social Determinants of Health     Financial Resource Strain: Low Risk     Difficulty of Paying Living Expenses: Not hard at all   Food Insecurity: No Food Insecurity    Worried About Running Out of Food in the Last Year: Never true    Ran Out of Food in the Last Year: Never true   Transportation Needs: No Transportation Needs    Lack of Transportation (Medical): No    Lack of Transportation (Non-Medical): No   Stress: No Stress Concern Present    Feeling of Stress : Not at all   Social Connections: Unknown    Frequency of Communication with Friends and Family: More than three times a week  "   Frequency of Social Gatherings with Friends and Family: More than three times a week    Marital Status:    Housing Stability: Low Risk     Unable to Pay for Housing in the Last Year: No    Number of Places Lived in the Last Year: 1    Unstable Housing in the Last Year: No        History reviewed. No pertinent family history.     Medications and Allergies     Medications  Current Outpatient Medications   Medication Instructions    albuterol (PROVENTIL/VENTOLIN HFA) 90 mcg/actuation inhaler Inhalation    cetirizine (ZYRTEC) 10 mg, Oral, Daily    cyanocobalamin 1,000 mcg, Intramuscular, Every 7 days    diclofenac (VOLTAREN) 75 mg, Oral, 2 times daily PRN, Do not take this with Advil, Ibuprofen, Motrin, Asprin, or Toradol.    empagliflozin (JARDIANCE) 10 mg, Oral, Daily    fluticasone propionate (FLOVENT HFA) 220 mcg/actuation inhaler Oral, 2 times daily    hydrOXYzine pamoate (VISTARIL) 25 mg, Oral, Every 8 hours    pantoprazole (PROTONIX) 40 mg, Oral, Daily    valsartan (DIOVAN) 160 mg, Oral, Daily         Allergies  Review of patient's allergies indicates:   Allergen Reactions    Lisinopril Swelling    Penicillins     Sulfa (sulfonamide antibiotics)     Meloxicam Palpitations       Physical Examination     BP (!) 145/69 (BP Location: Right arm, Patient Position: Sitting, BP Method: Medium (Automatic))   Pulse 65   Temp 97.9 °F (36.6 °C) (Oral)   Resp 18   Ht 5' 4.02" (1.626 m)   Wt 74.8 kg (164 lb 12.8 oz)   BMI 28.27 kg/m²     Physical Exam    General: Alert and oriented, No acute distress.  Head: Normocephalic, Atraumatic.  Eye: Pupils are equal, round and reactive to light, Extraocular movements are intact, Sclera non-icteric.  Ears/Nose/Throat: Normal, Mucosa moist,Clear.  Neck/Thyroid: Supple, Non-tender, No carotid bruit, No lymphadenopathy, No JVD, Full range of motion.  Respiratory: Clear to auscultation bilaterally; No wheezes, rales or rhonchi,Non-labored respirations, Symmetrical chest " wall expansion.  Cardiovascular: Regular rate and rhythm, S1/S2 normal, No murmurs, rubs or gallops.  Gastrointestinal: Soft, Non-tender, Non-distended, Normal bowel sounds, No palpable organomegaly.  Musculoskeletal: Normal range of motion.  Integumentary: Warm, Dry, Intact, No suspicious lesions or rashes.  Extremities: No clubbing, cyanosis or edema  Neurologic: No focal deficits, Cranial Nerves II-XII are grossly intact, Motor strength normal upper and lower extremities, Sensory exam intact.  Psychiatric: Normal interaction, Coherent speech, Appropriate affect       Results     Lab Results   Component Value Date    WBC 7.1 03/19/2023    HGB 14.9 03/19/2023    HCT 43.3 03/19/2023     03/19/2023    ALT 29 03/19/2023    AST 19 03/19/2023     (L) 03/19/2023    K 4.6 03/19/2023    CREATININE 0.81 03/19/2023    BUN 13.1 03/19/2023    CO2 22 (L) 03/19/2023    TSH 3.038 03/16/2023    INR 0.94 03/19/2023         Assessment and Plan (including Health Maintenance)     Plan:     1. Encounter to establish care  Labs ordered; is not fasting today.    2. Type 2 diabetes mellitus without complication, without long-term current use of insulin  POC A1C 7.3 at goal. Previous A1C around 6.   Continue jardiance as prescribed.  Follow ADA diet.  Avoid soda, simple sweets, and limit rice/pasta/bread/starches and consume brown options when possible.   Maintain healthy weight with BMI goal <30.   Perform aerobic exercise for 150 minutes per week (or 5 days a week for 30 minutes each day).   Examine feet daily.   Obtain annual dilated eye exam.  Eye exam: at next visit  Foot exam: at next visit    - POCT HEMOGLOBIN A1C  - Comprehensive Metabolic Panel; Future  - Lipid Panel; Future  - Microalbumin/Creatinine Ratio, Urine; Future    3. Primary hypertension  Elevated.  Refilled valsartan.  Follow a low sodium (less than 2 grams of sodium per day), DASH diet.   Continue medications as prescribed.  Monitor blood pressure and  report any consistent values greater than 140/90 and keep a log.  Encouraged smoking cessation to aid in BP reduction and co-morbidities.   Maintain healthy weight with a BMI goal of <30.   Aerobic exercise for 150 minutes per week (or 5 days a week for 30 minutes each day).    - Comprehensive Metabolic Panel; Future  - Lipid Panel; Future  - TSH; Future  - Microalbumin/Creatinine Ratio, Urine; Future    4. Mixed anxiety and depressive disorder  Referral to  to eval/treat.  Has failed on multiple SSRI's in the past.  Reports made her suicidal.  Keep appt when scheduled.    5. Tobacco dependence  Smoking cessation discussed; total time 3 mins.   Pt not ready to quit.  Declines referral to Smoking Cessation program.  Discussed benefits of quitting including improved health, decreased cardiac/vascular/pulmonary/stroke risks as well as saving money.      6. B12 deficiency  Refilled vit B12.  B12 level ordered.  - Vitamin B12; Future        Problem List Items Addressed This Visit          Psychiatric    Mixed anxiety and depressive disorder (Chronic)    Relevant Orders    Ambulatory referral/consult to Behavioral Health       Cardiac/Vascular    Primary hypertension (Chronic)    Relevant Orders    Comprehensive Metabolic Panel    Lipid Panel    TSH    Microalbumin/Creatinine Ratio, Urine       Endocrine    Type 2 diabetes mellitus without complication, without long-term current use of insulin - Primary (Chronic)    Relevant Orders    POCT HEMOGLOBIN A1C (Completed)    Comprehensive Metabolic Panel    Lipid Panel    Microalbumin/Creatinine Ratio, Urine       GI    Hepatic steatosis (Chronic)       Other    Tobacco dependence (Chronic)    Encounter to establish care     Other Visit Diagnoses       B12 deficiency        Relevant Orders    Vitamin B12              Health Maintenance Due   Topic Date Due    Hepatitis C Screening  Never done    Cervical Cancer Screening  Never done    Lipid Panel  Never done    Pneumococcal  Vaccines (Age 0-64) (1 - PCV) Never done    HIV Screening  Never done    Colorectal Cancer Screening  Never done    Shingles Vaccine (1 of 2) Never done    TETANUS VACCINE  09/12/2021    Mammogram  12/24/2021    COVID-19 Vaccine (2 - Booster for Theresa series) 01/14/2022    Influenza Vaccine (1) 09/01/2022       Follow up in about 6 weeks (around 6/8/2023) for Wellness with labs completed prior to visit.        Signature:  RACHEAL Lerner  [facility]  [address] [facility] [address]

## 2023-04-28 PROBLEM — F41.8 MIXED ANXIETY AND DEPRESSIVE DISORDER: Chronic | Status: ACTIVE | Noted: 2023-04-28

## 2023-05-01 ENCOUNTER — LAB VISIT (OUTPATIENT)
Dept: LAB | Facility: HOSPITAL | Age: 63
End: 2023-05-01
Attending: NURSE PRACTITIONER
Payer: MEDICAID

## 2023-05-01 DIAGNOSIS — E11.9 TYPE 2 DIABETES MELLITUS WITHOUT COMPLICATION, WITHOUT LONG-TERM CURRENT USE OF INSULIN: ICD-10-CM

## 2023-05-01 DIAGNOSIS — I10 PRIMARY HYPERTENSION: Chronic | ICD-10-CM

## 2023-05-01 DIAGNOSIS — E53.8 B12 DEFICIENCY: ICD-10-CM

## 2023-05-01 LAB
ALBUMIN SERPL-MCNC: 4.1 G/DL (ref 3.4–4.8)
ALBUMIN/GLOB SERPL: 1.3 RATIO (ref 1.1–2)
ALP SERPL-CCNC: 74 UNIT/L (ref 40–150)
ALT SERPL-CCNC: 40 UNIT/L (ref 0–55)
AST SERPL-CCNC: 27 UNIT/L (ref 5–34)
BILIRUBIN DIRECT+TOT PNL SERPL-MCNC: 0.6 MG/DL
BUN SERPL-MCNC: 13.2 MG/DL (ref 9.8–20.1)
CALCIUM SERPL-MCNC: 9.3 MG/DL (ref 8.4–10.2)
CHLORIDE SERPL-SCNC: 108 MMOL/L (ref 98–107)
CHOLEST SERPL-MCNC: 234 MG/DL
CHOLEST/HDLC SERPL: 6 {RATIO} (ref 0–5)
CO2 SERPL-SCNC: 25 MMOL/L (ref 23–31)
CREAT SERPL-MCNC: 0.79 MG/DL (ref 0.55–1.02)
CREAT UR-MCNC: 107.7 MG/DL (ref 47–110)
GFR SERPLBLD CREATININE-BSD FMLA CKD-EPI: >60 MLS/MIN/1.73/M2
GLOBULIN SER-MCNC: 3.2 GM/DL (ref 2.4–3.5)
GLUCOSE SERPL-MCNC: 152 MG/DL (ref 82–115)
HDLC SERPL-MCNC: 39 MG/DL (ref 35–60)
LDLC SERPL CALC-MCNC: 174 MG/DL (ref 50–140)
MICROALBUMIN UR-MCNC: 9.3 UG/ML
MICROALBUMIN/CREAT RATIO PNL UR: 8.6 MG/GM CR (ref 0–30)
POTASSIUM SERPL-SCNC: 4.1 MMOL/L (ref 3.5–5.1)
PROT SERPL-MCNC: 7.3 GM/DL (ref 5.8–7.6)
SODIUM SERPL-SCNC: 138 MMOL/L (ref 136–145)
TRIGL SERPL-MCNC: 103 MG/DL (ref 37–140)
TSH SERPL-ACNC: 1.32 UIU/ML (ref 0.35–4.94)
VIT B12 SERPL-MCNC: 461 PG/ML (ref 213–816)
VLDLC SERPL CALC-MCNC: 21 MG/DL

## 2023-05-01 PROCEDURE — 82607 VITAMIN B-12: CPT

## 2023-05-01 PROCEDURE — 80053 COMPREHEN METABOLIC PANEL: CPT

## 2023-05-01 PROCEDURE — 36415 COLL VENOUS BLD VENIPUNCTURE: CPT

## 2023-05-01 PROCEDURE — 84443 ASSAY THYROID STIM HORMONE: CPT

## 2023-05-01 PROCEDURE — 80061 LIPID PANEL: CPT

## 2023-05-01 PROCEDURE — 82043 UR ALBUMIN QUANTITATIVE: CPT

## 2023-05-13 ENCOUNTER — HOSPITAL ENCOUNTER (EMERGENCY)
Facility: HOSPITAL | Age: 63
Discharge: HOME | End: 2023-05-13
Attending: EMERGENCY MEDICINE
Payer: MEDICAID

## 2023-05-13 VITALS
TEMPERATURE: 98 F | WEIGHT: 159.19 LBS | RESPIRATION RATE: 16 BRPM | BODY MASS INDEX: 27.18 KG/M2 | DIASTOLIC BLOOD PRESSURE: 86 MMHG | SYSTOLIC BLOOD PRESSURE: 167 MMHG | HEART RATE: 68 BPM | OXYGEN SATURATION: 99 % | HEIGHT: 64 IN

## 2023-05-13 DIAGNOSIS — K02.9 DENTAL CARIES: ICD-10-CM

## 2023-05-13 DIAGNOSIS — K04.7 DENTAL ABSCESS: Primary | ICD-10-CM

## 2023-05-13 PROCEDURE — 99284 EMERGENCY DEPT VISIT MOD MDM: CPT

## 2023-05-13 PROCEDURE — 25000003 PHARM REV CODE 250: Performed by: EMERGENCY MEDICINE

## 2023-05-13 RX ORDER — TRAMADOL HYDROCHLORIDE 50 MG/1
100 TABLET ORAL EVERY 12 HOURS PRN
Qty: 20 TABLET | Refills: 0 | Status: SHIPPED | OUTPATIENT
Start: 2023-05-13 | End: 2023-06-08

## 2023-05-13 RX ORDER — KETOROLAC TROMETHAMINE 10 MG/1
10 TABLET, FILM COATED ORAL EVERY 8 HOURS PRN
Qty: 15 TABLET | Refills: 1 | Status: SHIPPED | OUTPATIENT
Start: 2023-05-13 | End: 2023-06-08 | Stop reason: ALTCHOICE

## 2023-05-13 RX ORDER — ACETAMINOPHEN 500 MG
1000 TABLET ORAL
Status: COMPLETED | OUTPATIENT
Start: 2023-05-13 | End: 2023-05-13

## 2023-05-13 RX ORDER — KETOROLAC TROMETHAMINE 10 MG/1
10 TABLET, FILM COATED ORAL
Status: COMPLETED | OUTPATIENT
Start: 2023-05-13 | End: 2023-05-13

## 2023-05-13 RX ORDER — AMOXICILLIN AND CLAVULANATE POTASSIUM 875; 125 MG/1; MG/1
1 TABLET, FILM COATED ORAL 2 TIMES DAILY
Qty: 14 TABLET | Refills: 0 | Status: SHIPPED | OUTPATIENT
Start: 2023-05-13 | End: 2023-06-08 | Stop reason: ALTCHOICE

## 2023-05-13 RX ORDER — AMOXICILLIN AND CLAVULANATE POTASSIUM 875; 125 MG/1; MG/1
1 TABLET, FILM COATED ORAL
Status: COMPLETED | OUTPATIENT
Start: 2023-05-13 | End: 2023-05-13

## 2023-05-13 RX ADMIN — AMOXICILLIN AND CLAVULANATE POTASSIUM 1 TABLET: 875; 125 TABLET, FILM COATED ORAL at 09:05

## 2023-05-13 RX ADMIN — KETOROLAC TROMETHAMINE 10 MG: 10 TABLET, FILM COATED ORAL at 09:05

## 2023-05-13 RX ADMIN — ACETAMINOPHEN 1000 MG: 500 TABLET, FILM COATED ORAL at 09:05

## 2023-05-14 NOTE — ED PROVIDER NOTES
Encounter Date: 2023       History     Chief Complaint   Patient presents with    Dental Problem     Pt arrives with c/o dental abscess to the left lower mouth x6 months     No dental contact in many many years.  Known multiple erosions, needs extractions, fractured left lower posterior molar 6 months ago, painful for 3 or 4 weeks worse in last few days.  No recent antibiotics.  No swelling or fever.  Plans to go to a dentist sometime in the next week or so.  Chart lists allergy, she states she has had no exposure to penicillin in 30 or probably 40 years, reaction was a rash of some type as a young person.  She also tolerate nonsteroidals well and has been taking ibuprofen.  No other complaints.  Counseled regarding dental care and antibiotic allergies.    The history is provided by the patient. No  was used.   Review of patient's allergies indicates:   Allergen Reactions    Lisinopril Swelling    Sulfa (sulfonamide antibiotics)     Meloxicam Palpitations     Past Medical History:   Diagnosis Date    Diabetes mellitus     Hypertension      Past Surgical History:   Procedure Laterality Date    ABDOMINOPLASTY  2009    CERVICAL FUSION  2015     SECTION      COLONOSCOPY  2016    REMOVAL OF BREAST IMPLANT  2016     History reviewed. No pertinent family history.  Social History     Tobacco Use    Smoking status: Every Day     Types: Cigarettes     Last attempt to quit: 2023     Years since quittin.3    Smokeless tobacco: Never   Substance Use Topics    Alcohol use: Never    Drug use: Not Currently     Types: Marijuana     Review of Systems   Constitutional:  Positive for fever.   HENT:  Positive for dental problem.      Physical Exam     Initial Vitals [23]   BP Pulse Resp Temp SpO2   (!) 183/92 67 18 98.3 °F (36.8 °C) 98 %      MAP       --         Physical Exam    Constitutional: She appears well-developed and well-nourished. She appears distressed.   HENT:   Head:  Normocephalic and atraumatic.   Several missing and or badly eroded teeth, tender left lower posterior molar without gingival swelling or discharge.  No mandibular swelling or erythema.  No other findings.   Pulmonary/Chest: No respiratory distress.     Neurological: She is alert and oriented to person, place, and time.       ED Course   Procedures  Labs Reviewed - No data to display       Imaging Results    None          Medications   amoxicillin-clavulanate 875-125mg per tablet 1 tablet (has no administration in time range)   ketorolac tablet 10 mg (has no administration in time range)   acetaminophen tablet 1,000 mg (has no administration in time range)        Additional MDM:   Differential Diagnosis:   Dental caries/ abscess/ gingivitis                 Medical Decision Making  Problems Addressed:  Dental abscess: chronic illness or injury with exacerbation, progression, or side effects of treatment  Dental caries: chronic illness or injury    Risk  Prescription drug management.             Clinical Impression:   Final diagnoses:  [K04.7] Dental abscess (Primary)  [K02.9] Dental caries        ED Disposition Condition    Discharge Stable          ED Prescriptions       Medication Sig Dispense Start Date End Date Auth. Provider    amoxicillin-clavulanate 875-125mg (AUGMENTIN) 875-125 mg per tablet Take 1 tablet by mouth 2 (two) times daily. 14 tablet 5/13/2023 -- Vinod Arguello MD    ketorolac (TORADOL) 10 mg tablet Take 1 tablet (10 mg total) by mouth every 8 (eight) hours as needed for Pain. 15 tablet 5/13/2023 -- Vinod Arguello MD    traMADoL (ULTRAM) 50 mg tablet Take 2 tablets (100 mg total) by mouth every 12 (twelve) hours as needed for Pain. 20 tablet 5/13/2023 -- Vinod Arguello MD          Follow-up Information       Follow up With Specialties Details Why Contact Info    RACHEAL Blakely Nurse Practitioner Schedule an appointment as soon as possible for a visit   [facility]  [place]  Internal Medicine [facility] [address]  [phone]      [facility] Emergency Medicine  As needed [address] [facility] [address] [phone]             Vinod Arguello MD  05/13/23 2137

## 2023-05-18 ENCOUNTER — PATIENT OUTREACH (OUTPATIENT)
Dept: EMERGENCY MEDICINE | Facility: HOSPITAL | Age: 63
End: 2023-05-18
Payer: MEDICAID

## 2023-06-07 ENCOUNTER — TELEPHONE (OUTPATIENT)
Dept: INTERNAL MEDICINE | Facility: CLINIC | Age: 63
End: 2023-06-07
Payer: MEDICAID

## 2023-06-07 NOTE — TELEPHONE ENCOUNTER
Pt was calling because she was unsure if she need do labs on her  apt on tomorrow I looked at her apt and confirmed that there was no labs needed for her upcoming apt

## 2023-06-08 ENCOUNTER — HOSPITAL ENCOUNTER (OUTPATIENT)
Dept: RADIOLOGY | Facility: HOSPITAL | Age: 63
Discharge: HOME | End: 2023-06-08
Attending: NURSE PRACTITIONER
Payer: MEDICAID

## 2023-06-08 ENCOUNTER — OFFICE VISIT (OUTPATIENT)
Dept: INTERNAL MEDICINE | Facility: CLINIC | Age: 63
End: 2023-06-08
Payer: MEDICAID

## 2023-06-08 ENCOUNTER — CLINICAL SUPPORT (OUTPATIENT)
Dept: INTERNAL MEDICINE | Facility: CLINIC | Age: 63
End: 2023-06-08
Attending: NURSE PRACTITIONER
Payer: MEDICAID

## 2023-06-08 VITALS
DIASTOLIC BLOOD PRESSURE: 78 MMHG | HEART RATE: 60 BPM | WEIGHT: 159.38 LBS | SYSTOLIC BLOOD PRESSURE: 140 MMHG | TEMPERATURE: 98 F | RESPIRATION RATE: 20 BRPM | BODY MASS INDEX: 27.21 KG/M2 | HEIGHT: 64 IN

## 2023-06-08 DIAGNOSIS — G89.29 CHRONIC BILATERAL LOW BACK PAIN WITH LEFT-SIDED SCIATICA: ICD-10-CM

## 2023-06-08 DIAGNOSIS — Z12.31 ENCOUNTER FOR SCREENING MAMMOGRAM FOR MALIGNANT NEOPLASM OF BREAST: ICD-10-CM

## 2023-06-08 DIAGNOSIS — M54.42 CHRONIC BILATERAL LOW BACK PAIN WITH LEFT-SIDED SCIATICA: Chronic | ICD-10-CM

## 2023-06-08 DIAGNOSIS — Z13.5 SCREENING FOR DIABETIC RETINOPATHY: ICD-10-CM

## 2023-06-08 DIAGNOSIS — Z78.0 POSTMENOPAUSAL: ICD-10-CM

## 2023-06-08 DIAGNOSIS — E78.2 MIXED HYPERLIPIDEMIA: Chronic | ICD-10-CM

## 2023-06-08 DIAGNOSIS — F17.200 TOBACCO DEPENDENCE: Chronic | ICD-10-CM

## 2023-06-08 DIAGNOSIS — G89.29 CHRONIC BILATERAL LOW BACK PAIN WITH LEFT-SIDED SCIATICA: Chronic | ICD-10-CM

## 2023-06-08 DIAGNOSIS — Z01.419 WELL WOMAN EXAM: ICD-10-CM

## 2023-06-08 DIAGNOSIS — I10 PRIMARY HYPERTENSION: Primary | Chronic | ICD-10-CM

## 2023-06-08 DIAGNOSIS — Z12.11 SCREENING FOR MALIGNANT NEOPLASM OF COLON: ICD-10-CM

## 2023-06-08 DIAGNOSIS — M54.42 CHRONIC BILATERAL LOW BACK PAIN WITH LEFT-SIDED SCIATICA: ICD-10-CM

## 2023-06-08 DIAGNOSIS — E11.9 TYPE 2 DIABETES MELLITUS WITHOUT COMPLICATION, WITHOUT LONG-TERM CURRENT USE OF INSULIN: Chronic | ICD-10-CM

## 2023-06-08 LAB
LEFT EYE DM RETINOPATHY: NEGATIVE
RIGHT EYE DM RETINOPATHY: NEGATIVE

## 2023-06-08 PROCEDURE — 3078F DIAST BP <80 MM HG: CPT | Mod: CPTII,,, | Performed by: NURSE PRACTITIONER

## 2023-06-08 PROCEDURE — 99406 BEHAV CHNG SMOKING 3-10 MIN: CPT | Mod: S$PBB,,, | Performed by: NURSE PRACTITIONER

## 2023-06-08 PROCEDURE — 3008F PR BODY MASS INDEX (BMI) DOCUMENTED: ICD-10-PCS | Mod: CPTII,,, | Performed by: NURSE PRACTITIONER

## 2023-06-08 PROCEDURE — 3066F NEPHROPATHY DOC TX: CPT | Mod: CPTII,,, | Performed by: NURSE PRACTITIONER

## 2023-06-08 PROCEDURE — 3008F BODY MASS INDEX DOCD: CPT | Mod: CPTII,,, | Performed by: NURSE PRACTITIONER

## 2023-06-08 PROCEDURE — 1159F MED LIST DOCD IN RCRD: CPT | Mod: CPTII,,, | Performed by: NURSE PRACTITIONER

## 2023-06-08 PROCEDURE — 1160F RVW MEDS BY RX/DR IN RCRD: CPT | Mod: CPTII,,, | Performed by: NURSE PRACTITIONER

## 2023-06-08 PROCEDURE — 3078F PR MOST RECENT DIASTOLIC BLOOD PRESSURE < 80 MM HG: ICD-10-PCS | Mod: CPTII,,, | Performed by: NURSE PRACTITIONER

## 2023-06-08 PROCEDURE — 99214 PR OFFICE/OUTPT VISIT, EST, LEVL IV, 30-39 MIN: ICD-10-PCS | Mod: 25,S$PBB,, | Performed by: NURSE PRACTITIONER

## 2023-06-08 PROCEDURE — 3077F PR MOST RECENT SYSTOLIC BLOOD PRESSURE >= 140 MM HG: ICD-10-PCS | Mod: CPTII,,, | Performed by: NURSE PRACTITIONER

## 2023-06-08 PROCEDURE — 1159F PR MEDICATION LIST DOCUMENTED IN MEDICAL RECORD: ICD-10-PCS | Mod: CPTII,,, | Performed by: NURSE PRACTITIONER

## 2023-06-08 PROCEDURE — 3051F HG A1C>EQUAL 7.0%<8.0%: CPT | Mod: CPTII,,, | Performed by: NURSE PRACTITIONER

## 2023-06-08 PROCEDURE — 3061F PR NEG MICROALBUMINURIA RESULT DOCUMENTED/REVIEW: ICD-10-PCS | Mod: CPTII,,, | Performed by: NURSE PRACTITIONER

## 2023-06-08 PROCEDURE — 99214 OFFICE O/P EST MOD 30 MIN: CPT | Mod: 25,S$PBB,, | Performed by: NURSE PRACTITIONER

## 2023-06-08 PROCEDURE — 3066F PR DOCUMENTATION OF TREATMENT FOR NEPHROPATHY: ICD-10-PCS | Mod: CPTII,,, | Performed by: NURSE PRACTITIONER

## 2023-06-08 PROCEDURE — 4010F PR ACE/ARB THEARPY RXD/TAKEN: ICD-10-PCS | Mod: CPTII,,, | Performed by: NURSE PRACTITIONER

## 2023-06-08 PROCEDURE — 99406 PR TOBACCO USE CESSATION INTERMEDIATE 3-10 MINUTES: ICD-10-PCS | Mod: S$PBB,,, | Performed by: NURSE PRACTITIONER

## 2023-06-08 PROCEDURE — 3051F PR MOST RECENT HEMOGLOBIN A1C LEVEL 7.0 - < 8.0%: ICD-10-PCS | Mod: CPTII,,, | Performed by: NURSE PRACTITIONER

## 2023-06-08 PROCEDURE — 3077F SYST BP >= 140 MM HG: CPT | Mod: CPTII,,, | Performed by: NURSE PRACTITIONER

## 2023-06-08 PROCEDURE — 3061F NEG MICROALBUMINURIA REV: CPT | Mod: CPTII,,, | Performed by: NURSE PRACTITIONER

## 2023-06-08 PROCEDURE — 92228 IMG RTA DETC/MNTR DS PHY/QHP: CPT | Mod: PBBFAC

## 2023-06-08 PROCEDURE — 96372 THER/PROPH/DIAG INJ SC/IM: CPT | Mod: PBBFAC

## 2023-06-08 PROCEDURE — 72100 X-RAY EXAM L-S SPINE 2/3 VWS: CPT | Mod: TC

## 2023-06-08 PROCEDURE — 4010F ACE/ARB THERAPY RXD/TAKEN: CPT | Mod: CPTII,,, | Performed by: NURSE PRACTITIONER

## 2023-06-08 PROCEDURE — 99215 OFFICE O/P EST HI 40 MIN: CPT | Mod: 25,PBBFAC | Performed by: NURSE PRACTITIONER

## 2023-06-08 PROCEDURE — 1160F PR REVIEW ALL MEDS BY PRESCRIBER/CLIN PHARMACIST DOCUMENTED: ICD-10-PCS | Mod: CPTII,,, | Performed by: NURSE PRACTITIONER

## 2023-06-08 RX ORDER — TIZANIDINE 4 MG/1
4 TABLET ORAL EVERY 8 HOURS PRN
Qty: 45 TABLET | Refills: 1 | OUTPATIENT
Start: 2023-06-08 | End: 2023-08-29

## 2023-06-08 RX ORDER — VALSARTAN 320 MG/1
320 TABLET ORAL DAILY
Qty: 30 TABLET | Refills: 1 | Status: SHIPPED | OUTPATIENT
Start: 2023-06-08 | End: 2023-06-08 | Stop reason: DRUGHIGH

## 2023-06-08 RX ORDER — AMOXICILLIN 500 MG
CAPSULE ORAL DAILY
COMMUNITY

## 2023-06-08 RX ORDER — ZINC GLUCONATE 50 MG
50 TABLET ORAL DAILY
COMMUNITY

## 2023-06-08 RX ORDER — HYDROXYZINE PAMOATE 25 MG/1
25 CAPSULE ORAL EVERY 8 HOURS PRN
Qty: 60 CAPSULE | Refills: 1 | Status: SHIPPED | OUTPATIENT
Start: 2023-06-08 | End: 2023-09-27

## 2023-06-08 RX ORDER — DICLOFENAC SODIUM 75 MG/1
75 TABLET, DELAYED RELEASE ORAL 2 TIMES DAILY PRN
Qty: 30 TABLET | Refills: 0 | Status: SHIPPED | OUTPATIENT
Start: 2023-06-08 | End: 2023-08-22

## 2023-06-08 RX ORDER — MAGNESIUM 30 MG
TABLET ORAL ONCE
COMMUNITY

## 2023-06-08 RX ORDER — ROSUVASTATIN CALCIUM 10 MG/1
10 TABLET, COATED ORAL DAILY
Qty: 90 TABLET | Refills: 1 | Status: SHIPPED | OUTPATIENT
Start: 2023-06-08 | End: 2024-03-25 | Stop reason: DRUGHIGH

## 2023-06-08 RX ORDER — PHENYLEPHRINE HCL 10 MG
500 TABLET ORAL DAILY
COMMUNITY

## 2023-06-08 RX ORDER — KETOROLAC TROMETHAMINE 10 MG/1
10 TABLET, FILM COATED ORAL EVERY 6 HOURS
Qty: 20 TABLET | Refills: 0 | Status: SHIPPED | OUTPATIENT
Start: 2023-06-08 | End: 2023-06-13

## 2023-06-08 RX ORDER — VALSARTAN 80 MG/1
80 TABLET ORAL DAILY
Qty: 30 TABLET | Refills: 1 | Status: SHIPPED | OUTPATIENT
Start: 2023-06-08 | End: 2023-08-22 | Stop reason: SDUPTHER

## 2023-06-08 RX ORDER — ECHINACEA 380 MG
CAPSULE ORAL
COMMUNITY

## 2023-06-08 RX ORDER — KETOROLAC TROMETHAMINE 30 MG/ML
30 INJECTION, SOLUTION INTRAMUSCULAR; INTRAVENOUS
Status: COMPLETED | OUTPATIENT
Start: 2023-06-08 | End: 2023-06-08

## 2023-06-08 RX ADMIN — KETOROLAC TROMETHAMINE 30 MG: 30 INJECTION, SOLUTION INTRAMUSCULAR; INTRAVENOUS at 08:06

## 2023-06-08 NOTE — PROGRESS NOTES
Juju Oviedo, RACHEAL   [facility]  [address] [facility] [address]      PATIENT NAME: Janet Alvarez  : 1960  DATE: 23  MRN: [id number]      Reason for Visit / Chief Complaint: Follow-up (Wellness, lab results, refused vaccines, req GYN referral)       History of Present Illness / Problem Focused Workflow     Janet Alvarez is a 62 y.o. White female presents to the clinic for lab results. PMH DM, HTN, HLD, anxiety, silicone breast implants, Vit B12 deficiency, hepatic steatosis, hx thyroid bx , +HPV on last pap, and abdominoplasty.    Today, results reviewed with pt. Reported to ED on 23 for dental abscess/caries and treated with augmentin. Pt has appt with dentist next week. Notes from previous PCP received and reviewed. Reports med compliance. Attempts to follow ADA diet. Only checks CBGs sporadically. Reports anxiety and depression are about the same; is taking vistaril prn. Reports intermittent LBP that radiates down her left leg, 8/10 today, onset several years, worse with working or pulling at work. Has minimal improvement with vicks, IBU/Tylenol, and ice. Declines vaccines today. Denies chest pain, shortness of breath, cough, fever, headache, dizziness, weakness, abdominal pain, nausea, vomiting, diarrhea, constipation, dysuria, SI/HI.    Review of Systems     Review of Systems     See HPI for details    Constitutional: Denies Change in appetite. Denies Chills. Denies Fever. Denies Night sweats.  Eye: Denies Blurred vision. Denies Discharge. Denies Eye pain.  ENT: Denies Decreased hearing. Denies Sore throat. Denies Swollen glands.  Respiratory: Denies Cough. Denies Shortness of breath. Denies Shortness of breath with exertion. Denies Wheezing.  Cardiovascular: Denies Chest pain at rest. Denies Chest pain with exertion. Denies Irregular heartbeat. Denies Palpitations. Denies Edema.  Gastrointestinal: Denies  Abdominal pain. Denies Diarrhea. Denies Nausea. Denies Vomiting. Denies Hematemesis or Hematochezia.  Genitourinary: Denies Dysuria. Denies Urinary frequency. Denies Urinary urgency. Denies Blood in urine.  Endocrine: Denies Cold intolerance. Denies Excessive thirst. Denies Heat intolerance. Denies Weight loss. Denies Weight gain.  Musculoskeletal: Admits Painful joints. Denies Weakness.  Integumentary: Denies Rash. Denies Itching. Denies Dry skin.  Neurologic: Denies Dizziness. Denies Fainting. Denies Headache.  Psychiatric: Admits Depression. Admits Anxiety. Denies Suicidal/Homicidal ideations.    All Other ROS: Negative except as stated in HPI.     Medical / Surgical / Social / Family History       ----------------------------  Diabetes mellitus  Hypertension     Past Surgical History:   Procedure Laterality Date    ABDOMINOPLASTY  2009    CERVICAL FUSION  2015     SECTION      COLONOSCOPY  2016    REMOVAL OF BREAST IMPLANT  2016       Social History     Socioeconomic History    Marital status:    Tobacco Use    Smoking status: Every Day     Types: Cigarettes     Last attempt to quit: 2023     Years since quittin.4    Smokeless tobacco: Never   Substance and Sexual Activity    Alcohol use: Never    Drug use: Not Currently     Types: Marijuana     Social Determinants of Health     Financial Resource Strain: Low Risk     Difficulty of Paying Living Expenses: Not hard at all   Food Insecurity: No Food Insecurity    Worried About Running Out of Food in the Last Year: Never true    Ran Out of Food in the Last Year: Never true   Transportation Needs: No Transportation Needs    Lack of Transportation (Medical): No    Lack of Transportation (Non-Medical): No   Stress: No Stress Concern Present    Feeling of Stress : Not at all   Social Connections: Unknown    Frequency of Communication with Friends and Family: More than three times a week    Frequency of Social Gatherings with Friends and Family:  "More than three times a week    Marital Status:    Housing Stability: Low Risk     Unable to Pay for Housing in the Last Year: No    Number of Places Lived in the Last Year: 1    Unstable Housing in the Last Year: No        History reviewed. No pertinent family history.     Medications and Allergies     Medications  Current Outpatient Medications   Medication Instructions    albuterol (PROVENTIL/VENTOLIN HFA) 90 mcg/actuation inhaler Inhalation    aloe vera 25 mg Cap Oral    cetirizine (ZYRTEC) 10 mg, Oral, Daily    cinnamon bark (CINNAMON) 500 mg, Oral, Daily    cyanocobalamin 1,000 mcg, Intramuscular, Every 7 days    diclofenac (VOLTAREN) 75 mg, Oral, 2 times daily PRN, Do not take this with Advil, Ibuprofen, Motrin, Asprin, or Toradol.    empagliflozin (JARDIANCE) 10 mg, Oral, Daily    fluticasone propionate (FLOVENT HFA) 220 mcg/actuation inhaler Oral, 2 times daily    hydrOXYzine pamoate (VISTARIL) 25 mg, Oral, Every 8 hours PRN    ketorolac (TORADOL) 10 mg, Oral, Every 6 hours, Do not take any other NSAIDS while taking. Only take tylenol    magnesium 30 mg Tab Oral, Once    omega-3 fatty acids/fish oil (FISH OIL-OMEGA-3 FATTY ACIDS) 300-1,000 mg capsule Oral, Daily    pantoprazole (PROTONIX) 40 mg, Oral, Daily    rosuvastatin (CRESTOR) 10 mg, Oral, Daily    tiZANidine (ZANAFLEX) 4 mg, Oral, Every 8 hours PRN    valsartan (DIOVAN) 80 mg, Oral, Daily    zinc gluconate 50 mg, Oral, Daily         Allergies  Review of patient's allergies indicates:   Allergen Reactions    Lisinopril Swelling    Sulfa (sulfonamide antibiotics)     Meloxicam Palpitations       Physical Examination     BP (!) 140/78 (BP Location: Right arm, Patient Position: Sitting, BP Method: Large (Automatic))   Pulse 60   Temp 97.9 °F (36.6 °C) (Oral)   Resp 20   Ht 5' 4.02" (1.626 m)   Wt 72.3 kg (159 lb 6.4 oz)   BMI 27.35 kg/m²     Physical Exam  HENT:      Mouth/Throat:      Dentition: Abnormal dentition. Dental caries present. "      Comments: Multiple missing/eroded teeth with caries  Musculoskeletal:      Lumbar back: Spasms and tenderness present. Decreased range of motion.       General: Alert and oriented, No acute distress.  Head: Normocephalic, Atraumatic.  Eye: Pupils are equal, round and reactive to light, Extraocular movements are intact, Sclera non-icteric.  Ears/Nose/Throat: Normal, Mucosa moist,Clear.  Neck/Thyroid: Supple, Non-tender, No carotid bruit, No lymphadenopathy, No JVD, Full range of motion.  Respiratory: Clear to auscultation bilaterally; No wheezes, rales or rhonchi,Non-labored respirations, Symmetrical chest wall expansion.  Cardiovascular: Regular rate and rhythm, S1/S2 normal, No murmurs, rubs or gallops.  Gastrointestinal: Soft, Non-tender, Non-distended, Normal bowel sounds, No palpable organomegaly.  Integumentary: Warm, Dry, Intact, No suspicious lesions or rashes.  Extremities: No clubbing, cyanosis or edema  Neurologic: No focal deficits, Cranial Nerves II-XII are grossly intact, Motor strength normal upper and lower extremities, Sensory exam intact.  Psychiatric: Normal interaction, Coherent speech, Appropriate affect       Results     Lab Results   Component Value Date    WBC 7.1 03/19/2023    HGB 14.9 03/19/2023    HCT 43.3 03/19/2023     03/19/2023    CHOL 234 (H) 05/01/2023    TRIG 103 05/01/2023    ALT 40 05/01/2023    AST 27 05/01/2023     05/01/2023    K 4.1 05/01/2023    CREATININE 0.79 05/01/2023    BUN 13.2 05/01/2023    CO2 25 05/01/2023    TSH 1.325 05/01/2023    INR 0.94 03/19/2023         Assessment and Plan (including Health Maintenance)     Plan:     1. Primary hypertension  Borderline; reports LBP.  Refilled valsartan.  Follow a low sodium (less than 2 grams of sodium per day), DASH diet.   Continue medications as prescribed.  Monitor blood pressure and report any consistent values greater than 140/90 and keep a log.  Encouraged smoking cessation to aid in BP reduction and  co-morbidities.   Maintain healthy weight with a BMI goal of <30.   Aerobic exercise for 150 minutes per week (or 5 days a week for 30 minutes each day).      2. Type 2 diabetes mellitus without complication, without long-term current use of insulin  A1C ordered. Previous A1C 7.3.   Continue jardiance as prescribed.  Follow ADA diet.  Avoid soda, simple sweets, and limit rice/pasta/bread/starches and consume brown options when possible.   Maintain healthy weight with BMI goal <30.   Perform aerobic exercise for 150 minutes per week (or 5 days a week for 30 minutes each day).   Examine feet daily.   Obtain annual dilated eye exam.  Eye exam: 6/8/23  Foot exam: at next visit      3. Mixed hyperlipidemia   / Trig  103/ HDL -  39 / Total chol - 234.  Rx crestor.  Follow a low cholesterol, low saturated fat diet with less than 200 mg of cholesterol a day.   Avoid fried foods and high saturated fats (kilgore, sausage, cookies, cakes, chips, cheese, whole milk, butter, mayonnaise, creamy dressings, gravy and cream sauces).  Add flax seed or fish oil supplements to diet.   Increase dietary fiber.   Regular exercise improves cholesterol levels.  Physical activity 5 times a week for 30 minutes per day (or 150 minutes per week).   Stressed importance of dietary modifications.      4. Tobacco dependence  Smoking cessation discussed; total time 3 mins.   Pt quit in Jan.  Encouraged and congratulated on continued cessation.  Discussed benefits of quitting including improved health, decreased cardiac/vascular/pulmonary/stroke risks as well as saving money.      5. Chronic bilateral low back pain with left-sided sciatica  Toradol IM today.  Rx toradol 10 mg po q6hrs x 5 days.  **Do not take NSAIDs (Ibuprofen, Naproxen, Aleve, Advil, Toradol, Mobic, Diclofenac), may take only Tylenol as needed for pain/headaches.  Understanding voiced.  Rx diclofenac prn after completing toradol.  Referral to PT to eval/treat.  Rx tizanidine  prn.  Perform back stretches daily.   Avoid activities than increase back pain or stiffness.   Apply heating pad, ice pack, and BioFreeze as needed; alternate every 15-20 minutes.   Massage back to loosen muscles.   Continue tylenol arthritis/NSAID/muscle relaxer as needed.      6. Encounter for screening mammogram for malignant neoplasm of breast  - Mammo Digital Screening Bilat w/ Tone; Future    7. Screening for malignant neoplasm of colon  - Cologuard Screening (Multitarget Stool DNA); Future  - Cologuard Screening (Multitarget Stool DNA)    8. Postmenopausal  - DXA Bone Density Axial Skeleton 1 or more sites; Future    9. Anxiety and depression  Rx vistaril prn.  Keep appt with  as scheduled on 7/20/23.  Has failed multiple SSRI/SSNI's in the past; states caused SI.  Denies SI/HI.  Read positive daily meditations, avoid negative media, set healthy boundaries.  Exercise daily, keep consistent sleep pattern, eat a healthy diet.  Establish good social support, make changes to reduce stress.  Do not drink alcohol or use illicit drugs.  Reports any symptoms of suicidal/homicidal ideations or self harm immediately, go to nearest emergency room.      Problem List Items Addressed This Visit          Cardiac/Vascular    Primary hypertension - Primary (Chronic)    Mixed hyperlipidemia (Chronic)    Relevant Orders    Lipid Panel       Endocrine    Type 2 diabetes mellitus without complication, without long-term current use of insulin (Chronic)    Relevant Orders    Hemoglobin A1C    Comprehensive Metabolic Panel    Lipid Panel       Orthopedic    Chronic bilateral low back pain with left-sided sciatica (Chronic)    Relevant Medications    ketorolac injection 30 mg    Other Relevant Orders    Ambulatory referral/consult to Physical/Occupational Therapy    X-Ray Lumbar Spine 2 Or 3 Views       Other    Tobacco dependence (Chronic)     Other Visit Diagnoses       Encounter for screening mammogram for malignant neoplasm of  breast        Relevant Orders    Mammo Digital Screening Bilat w/ Tone    Screening for malignant neoplasm of colon        Relevant Orders    Cologuard Screening (Multitarget Stool DNA)    Postmenopausal        Relevant Orders    DXA Bone Density Axial Skeleton 1 or more sites    Well woman exam        Relevant Orders    Ambulatory referral/consult to Gynecology    Screening for diabetic retinopathy        Relevant Orders    Diabetic Eye Screening Photo              Health Maintenance Due   Topic Date Due    Hepatitis C Screening  Never done    Cervical Cancer Screening  Never done    Pneumococcal Vaccines (Age 0-64) (1 - PCV) Never done    Foot Exam  Never done    Eye Exam  Never done    HIV Screening  Never done    Colorectal Cancer Screening  Never done    Shingles Vaccine (1 of 2) Never done    TETANUS VACCINE  09/12/2021    Mammogram  12/24/2021    COVID-19 Vaccine (2 - Booster for Theresa series) 01/14/2022       Follow up in about 2 months (around 8/8/2023) for Follow up with labs a couple days prior to appt..        Signature:  RACHEAL Lerner  [facility]  [address] [id number]

## 2023-06-14 ENCOUNTER — PATIENT MESSAGE (OUTPATIENT)
Dept: ADMINISTRATIVE | Facility: HOSPITAL | Age: 63
End: 2023-06-14
Payer: MEDICAID

## 2023-06-19 ENCOUNTER — PATIENT MESSAGE (OUTPATIENT)
Dept: ADMINISTRATIVE | Facility: HOSPITAL | Age: 63
End: 2023-06-19
Payer: MEDICAID

## 2023-07-06 LAB
PAP RECOMMENDATION EXT: ABNORMAL
PAP RECOMMENDATION EXT: ABNORMAL

## 2023-07-10 ENCOUNTER — PATIENT MESSAGE (OUTPATIENT)
Dept: ADMINISTRATIVE | Facility: HOSPITAL | Age: 63
End: 2023-07-10
Payer: MEDICAID

## 2023-07-11 ENCOUNTER — HOSPITAL ENCOUNTER (OUTPATIENT)
Dept: RADIOLOGY | Facility: HOSPITAL | Age: 63
Discharge: HOME | End: 2023-07-11
Attending: NURSE PRACTITIONER
Payer: MEDICAID

## 2023-07-11 DIAGNOSIS — Z78.0 POSTMENOPAUSAL: ICD-10-CM

## 2023-07-11 DIAGNOSIS — Z12.31 ENCOUNTER FOR SCREENING MAMMOGRAM FOR MALIGNANT NEOPLASM OF BREAST: ICD-10-CM

## 2023-07-11 PROCEDURE — 77080 DXA BONE DENSITY AXIAL: CPT | Mod: TC

## 2023-07-11 PROCEDURE — 77067 MAMMO DIGITAL SCREENING BILAT WITH TOMO: ICD-10-PCS | Mod: 26,,, | Performed by: RADIOLOGY

## 2023-07-11 PROCEDURE — 77063 BREAST TOMOSYNTHESIS BI: CPT | Mod: 26,,, | Performed by: RADIOLOGY

## 2023-07-11 PROCEDURE — 77063 MAMMO DIGITAL SCREENING BILAT WITH TOMO: ICD-10-PCS | Mod: 26,,, | Performed by: RADIOLOGY

## 2023-07-11 PROCEDURE — 77067 SCR MAMMO BI INCL CAD: CPT | Mod: TC

## 2023-07-11 PROCEDURE — 77067 SCR MAMMO BI INCL CAD: CPT | Mod: 26,,, | Performed by: RADIOLOGY

## 2023-07-24 ENCOUNTER — PATIENT OUTREACH (OUTPATIENT)
Dept: EMERGENCY MEDICINE | Facility: HOSPITAL | Age: 63
End: 2023-07-24
Payer: MEDICAID

## 2023-07-25 ENCOUNTER — PATIENT OUTREACH (OUTPATIENT)
Dept: EMERGENCY MEDICINE | Facility: HOSPITAL | Age: 63
End: 2023-07-25
Payer: MEDICAID

## 2023-07-25 ENCOUNTER — PATIENT MESSAGE (OUTPATIENT)
Dept: ADMINISTRATIVE | Facility: HOSPITAL | Age: 63
End: 2023-07-25
Payer: MEDICAID

## 2023-08-01 ENCOUNTER — PATIENT OUTREACH (OUTPATIENT)
Dept: EMERGENCY MEDICINE | Facility: HOSPITAL | Age: 63
End: 2023-08-01
Payer: MEDICAID

## 2023-08-11 ENCOUNTER — DOCUMENTATION ONLY (OUTPATIENT)
Dept: INTERNAL MEDICINE | Facility: CLINIC | Age: 63
End: 2023-08-11
Payer: MEDICAID

## 2023-08-17 NOTE — PROGRESS NOTES
Janet Alvarez is a 63 y.o. female here for a diabetic eye screening with non-dilated fundus photos per RACHEAL Blakely.    Patient cooperative?: Yes  Small pupils?: No  Last eye exam: unknown    For exam results, see Encounter Report.

## 2023-08-18 ENCOUNTER — DOCUMENTATION ONLY (OUTPATIENT)
Dept: INTERNAL MEDICINE | Facility: CLINIC | Age: 63
End: 2023-08-18
Payer: MEDICAID

## 2023-08-21 ENCOUNTER — PATIENT OUTREACH (OUTPATIENT)
Dept: EMERGENCY MEDICINE | Facility: HOSPITAL | Age: 63
End: 2023-08-21
Payer: MEDICAID

## 2023-08-22 ENCOUNTER — HOSPITAL ENCOUNTER (OUTPATIENT)
Dept: RADIOLOGY | Facility: HOSPITAL | Age: 63
Discharge: HOME | End: 2023-08-22
Attending: NURSE PRACTITIONER
Payer: MEDICAID

## 2023-08-22 ENCOUNTER — OFFICE VISIT (OUTPATIENT)
Dept: INTERNAL MEDICINE | Facility: CLINIC | Age: 63
End: 2023-08-22
Payer: MEDICAID

## 2023-08-22 VITALS
DIASTOLIC BLOOD PRESSURE: 74 MMHG | WEIGHT: 160 LBS | HEART RATE: 64 BPM | RESPIRATION RATE: 20 BRPM | HEIGHT: 64 IN | SYSTOLIC BLOOD PRESSURE: 131 MMHG | BODY MASS INDEX: 27.31 KG/M2 | TEMPERATURE: 98 F

## 2023-08-22 DIAGNOSIS — G89.29 CHRONIC PAIN OF LEFT KNEE: ICD-10-CM

## 2023-08-22 DIAGNOSIS — M85.89 OSTEOPENIA OF MULTIPLE SITES: Chronic | ICD-10-CM

## 2023-08-22 DIAGNOSIS — E66.3 OVERWEIGHT WITH BODY MASS INDEX (BMI) OF 27 TO 27.9 IN ADULT: Chronic | ICD-10-CM

## 2023-08-22 DIAGNOSIS — M25.562 CHRONIC PAIN OF LEFT KNEE: ICD-10-CM

## 2023-08-22 DIAGNOSIS — Z11.3 ROUTINE SCREENING FOR STI (SEXUALLY TRANSMITTED INFECTION): ICD-10-CM

## 2023-08-22 DIAGNOSIS — Z00.00 WELLNESS EXAMINATION: Primary | ICD-10-CM

## 2023-08-22 DIAGNOSIS — M54.2 NECK PAIN: ICD-10-CM

## 2023-08-22 DIAGNOSIS — M51.36 DDD (DEGENERATIVE DISC DISEASE), LUMBAR: Chronic | ICD-10-CM

## 2023-08-22 DIAGNOSIS — M54.2 NECK PAIN: Chronic | ICD-10-CM

## 2023-08-22 PROBLEM — M51.369 DDD (DEGENERATIVE DISC DISEASE), LUMBAR: Chronic | Status: ACTIVE | Noted: 2023-08-22

## 2023-08-22 PROCEDURE — 3078F PR MOST RECENT DIASTOLIC BLOOD PRESSURE < 80 MM HG: ICD-10-PCS | Mod: CPTII,,, | Performed by: NURSE PRACTITIONER

## 2023-08-22 PROCEDURE — 99396 PR PREVENTIVE VISIT,EST,40-64: ICD-10-PCS | Mod: S$PBB,,, | Performed by: NURSE PRACTITIONER

## 2023-08-22 PROCEDURE — 3075F PR MOST RECENT SYSTOLIC BLOOD PRESS GE 130-139MM HG: ICD-10-PCS | Mod: CPTII,,, | Performed by: NURSE PRACTITIONER

## 2023-08-22 PROCEDURE — 3061F NEG MICROALBUMINURIA REV: CPT | Mod: CPTII,,, | Performed by: NURSE PRACTITIONER

## 2023-08-22 PROCEDURE — 99212 PR OFFICE/OUTPT VISIT, EST, LEVL II, 10-19 MIN: ICD-10-PCS | Mod: 25,S$PBB,, | Performed by: NURSE PRACTITIONER

## 2023-08-22 PROCEDURE — 99396 PREV VISIT EST AGE 40-64: CPT | Mod: S$PBB,,, | Performed by: NURSE PRACTITIONER

## 2023-08-22 PROCEDURE — 4010F PR ACE/ARB THEARPY RXD/TAKEN: ICD-10-PCS | Mod: CPTII,,, | Performed by: NURSE PRACTITIONER

## 2023-08-22 PROCEDURE — 1159F PR MEDICATION LIST DOCUMENTED IN MEDICAL RECORD: ICD-10-PCS | Mod: CPTII,,, | Performed by: NURSE PRACTITIONER

## 2023-08-22 PROCEDURE — 73562 X-RAY EXAM OF KNEE 3: CPT | Mod: TC,LT

## 2023-08-22 PROCEDURE — 4010F ACE/ARB THERAPY RXD/TAKEN: CPT | Mod: CPTII,,, | Performed by: NURSE PRACTITIONER

## 2023-08-22 PROCEDURE — 3061F PR NEG MICROALBUMINURIA RESULT DOCUMENTED/REVIEW: ICD-10-PCS | Mod: CPTII,,, | Performed by: NURSE PRACTITIONER

## 2023-08-22 PROCEDURE — 3044F PR MOST RECENT HEMOGLOBIN A1C LEVEL <7.0%: ICD-10-PCS | Mod: CPTII,,, | Performed by: NURSE PRACTITIONER

## 2023-08-22 PROCEDURE — 1160F RVW MEDS BY RX/DR IN RCRD: CPT | Mod: CPTII,,, | Performed by: NURSE PRACTITIONER

## 2023-08-22 PROCEDURE — 3066F PR DOCUMENTATION OF TREATMENT FOR NEPHROPATHY: ICD-10-PCS | Mod: CPTII,,, | Performed by: NURSE PRACTITIONER

## 2023-08-22 PROCEDURE — 3075F SYST BP GE 130 - 139MM HG: CPT | Mod: CPTII,,, | Performed by: NURSE PRACTITIONER

## 2023-08-22 PROCEDURE — 3078F DIAST BP <80 MM HG: CPT | Mod: CPTII,,, | Performed by: NURSE PRACTITIONER

## 2023-08-22 PROCEDURE — 99212 OFFICE O/P EST SF 10 MIN: CPT | Mod: 25,S$PBB,, | Performed by: NURSE PRACTITIONER

## 2023-08-22 PROCEDURE — 99215 OFFICE O/P EST HI 40 MIN: CPT | Mod: PBBFAC | Performed by: NURSE PRACTITIONER

## 2023-08-22 PROCEDURE — 3066F NEPHROPATHY DOC TX: CPT | Mod: CPTII,,, | Performed by: NURSE PRACTITIONER

## 2023-08-22 PROCEDURE — 3008F PR BODY MASS INDEX (BMI) DOCUMENTED: ICD-10-PCS | Mod: CPTII,,, | Performed by: NURSE PRACTITIONER

## 2023-08-22 PROCEDURE — 3044F HG A1C LEVEL LT 7.0%: CPT | Mod: CPTII,,, | Performed by: NURSE PRACTITIONER

## 2023-08-22 PROCEDURE — 72040 X-RAY EXAM NECK SPINE 2-3 VW: CPT | Mod: TC

## 2023-08-22 PROCEDURE — 3008F BODY MASS INDEX DOCD: CPT | Mod: CPTII,,, | Performed by: NURSE PRACTITIONER

## 2023-08-22 PROCEDURE — 1159F MED LIST DOCD IN RCRD: CPT | Mod: CPTII,,, | Performed by: NURSE PRACTITIONER

## 2023-08-22 PROCEDURE — 1160F PR REVIEW ALL MEDS BY PRESCRIBER/CLIN PHARMACIST DOCUMENTED: ICD-10-PCS | Mod: CPTII,,, | Performed by: NURSE PRACTITIONER

## 2023-08-22 RX ORDER — VALSARTAN 80 MG/1
80 TABLET ORAL DAILY
Qty: 30 TABLET | Refills: 6 | Status: SHIPPED | OUTPATIENT
Start: 2023-08-22 | End: 2024-03-25 | Stop reason: SDUPTHER

## 2023-08-22 NOTE — PATIENT INSTRUCTIONS
Begin Calcium 600mg twice per day and Vitamin D 2000IU daily. Continue   Maintain a healthy BMI of <30.  Weight bearing exercise such as walking or resistance training to build bones 5 times a week.  Perform strengthening, range of motion and low-impact aerobic exercises (walking, water aerobics, cycling) as recommended to control pain and improve joint function.  Avoid soda and excessive alcohol.  Use assistive devices as needed for ambulation and fall prevention.  Avoid falls by assessing home for loose cords and rugs, wearing proper footwear, and using proper lighting in rooms.  Stressed importance of continued Smoking Cessation as smoking speeds bone loss.   Repeat DEXA in 7/2025.

## 2023-08-22 NOTE — PROGRESS NOTES
Juju Oviedo, RACHEAL   [facility]  [address] [facility] [id number]      PATIENT NAME: Janet Alvarez  : 1960  DATE: 23  MRN: [id number]      Reason for Visit / Chief Complaint: Hypertension (Needs refills, test results, refused vaccines)       History of Present Illness / Problem Focused Workflow     Janet Alvarez is a 63 y.o. White female presents to the clinic for annual wellness exam. PMH DM, HTN, HLD, anxiety, silicone breast implants, Vit B12 deficiency, hepatic steatosis, hx thyroid bx , +HPV on last pap, tobacco abuse (quit 2022), cervical fusion () and abdominoplasty. Followed by [facility], GYN.     Since last visit, pt reports med compliance except does not take crestor nightly. Pap smear note scanned into EMR. Continues to attempt ADA/low sodium diet. CBGs ranging 120-130s when checked daily. Pt did not attend  visit as scheduled in July; is r/s'd for 23. Continues to endorse anxiety/depression. Labs and diagnostics reviewed with pt. Has received cologuard and will complete. Declines vaccines today. Denies chest pain, shortness of breath, cough, fever, headache, dizziness, weakness, abdominal pain, nausea, vomiting, diarrhea, constipation, dysuria, SI/HI.    Review of Systems     Review of Systems     See HPI for details    Constitutional: Denies Change in appetite. Denies Chills. Denies Fever. Denies Night sweats.  Eye: Denies Blurred vision. Denies Discharge. Denies Eye pain.  ENT: Denies Decreased hearing. Denies Sore throat. Denies Swollen glands.  Respiratory: Denies Cough. Denies Shortness of breath. Denies Shortness of breath with exertion. Denies Wheezing.  Cardiovascular: Denies Chest pain at rest. Denies Chest pain with exertion. Denies Irregular heartbeat. Denies Palpitations. Denies Edema.  Gastrointestinal: Denies Abdominal pain. Denies Diarrhea. Denies Nausea. Denies  Vomiting. Denies Hematemesis or Hematochezia.  Genitourinary: Denies Dysuria. Denies Urinary frequency. Denies Urinary urgency. Denies Blood in urine.  Endocrine: Denies Cold intolerance. Denies Excessive thirst. Denies Heat intolerance. Denies Weight loss. Denies Weight gain.  Musculoskeletal: Admits Painful joints. Denies Weakness.  Integumentary: Denies Rash. Denies Itching. Denies Dry skin.  Neurologic: Denies Dizziness. Denies Fainting. Denies Headache.  Psychiatric: Admits Depression. Admits Anxiety. Denies Suicidal/Homicidal ideations.    All Other ROS: Negative except as stated in HPI.     Medical / Surgical / Social / Family History       ----------------------------  Diabetes mellitus  Hypertension     Past Surgical History:   Procedure Laterality Date    ABDOMINOPLASTY  2009    CERVICAL FUSION       SECTION      COLONOSCOPY  2016    REMOVAL OF BREAST IMPLANT  2016       Social History     Socioeconomic History    Marital status:    Tobacco Use    Smoking status: Former     Current packs/day: 0.00     Types: Cigarettes     Quit date: 2022     Years since quittin.6    Smokeless tobacco: Never   Substance and Sexual Activity    Alcohol use: Never    Drug use: Not Currently     Types: Marijuana     Social Determinants of Health     Financial Resource Strain: Low Risk  (3/21/2023)    Overall Financial Resource Strain (CARDIA)     Difficulty of Paying Living Expenses: Not hard at all   Food Insecurity: No Food Insecurity (3/21/2023)    Hunger Vital Sign     Worried About Running Out of Food in the Last Year: Never true     Ran Out of Food in the Last Year: Never true   Transportation Needs: No Transportation Needs (3/21/2023)    PRAPARE - Transportation     Lack of Transportation (Medical): No     Lack of Transportation (Non-Medical): No   Stress: No Stress Concern Present (3/21/2023)    [employer] [place] [employer] - Occupational Stress Questionnaire      "Feeling of Stress : Not at all   Social Connections: Unknown (3/21/2023)    Social Connection and Isolation Panel [NHANES]     Frequency of Communication with Friends and Family: More than three times a week     Frequency of Social Gatherings with Friends and Family: More than three times a week     Marital Status:    Housing Stability: Low Risk  (3/21/2023)    Housing Stability Vital Sign     Unable to Pay for Housing in the Last Year: No     Number of Places Lived in the Last Year: 1     Unstable Housing in the Last Year: No        History reviewed. No pertinent family history.     Medications and Allergies     Medications  Current Outpatient Medications   Medication Instructions    albuterol (PROVENTIL/VENTOLIN HFA) 90 mcg/actuation inhaler Inhalation    aloe vera 25 mg Cap Oral    cetirizine (ZYRTEC) 10 mg, Oral, Daily    cinnamon bark (CINNAMON) 500 mg, Oral, Daily    cyanocobalamin 1,000 mcg, Intramuscular, Every 7 days    empagliflozin (JARDIANCE) 10 mg, Oral, Daily    fluticasone propionate (FLOVENT HFA) 220 mcg/actuation inhaler Oral, 2 times daily    hydrOXYzine pamoate (VISTARIL) 25 mg, Oral, Every 8 hours PRN    magnesium 30 mg Tab Oral, Once    omega-3 fatty acids/fish oil (FISH OIL-OMEGA-3 FATTY ACIDS) 300-1,000 mg capsule Oral, Daily    pantoprazole (PROTONIX) 40 mg, Oral, Daily    rosuvastatin (CRESTOR) 10 mg, Oral, Daily    tiZANidine (ZANAFLEX) 4 mg, Oral, Every 8 hours PRN    valsartan (DIOVAN) 80 mg, Oral, Daily    zinc gluconate 50 mg, Oral, Daily         Allergies  Review of patient's allergies indicates:   Allergen Reactions    Lisinopril Swelling    Sulfa (sulfonamide antibiotics)     Meloxicam Palpitations       Physical Examination     /74 (BP Location: Right arm, Patient Position: Sitting, BP Method: Medium (Automatic))   Pulse 64   Temp 97.9 °F (36.6 °C) (Oral)   Resp 20   Ht 5' 4.02" (1.626 m)   Wt 72.6 kg (160 lb)   BMI 27.45 kg/m² "     Physical Exam  HENT:      Mouth/Throat:      Dentition: Abnormal dentition. Dental caries present.   Musculoskeletal:      Cervical back: Crepitus present. Muscular tenderness present. Decreased range of motion.      Lumbar back: Spasms and tenderness present. Decreased range of motion.        Back:       Right knee: Crepitus present.      Left knee: Swelling and crepitus present. Decreased range of motion.        Legs:        General: Alert and oriented, No acute distress.  Head: Normocephalic, Atraumatic.  Eye: Pupils are equal, round and reactive to light, Extraocular movements are intact, Sclera non-icteric.  Ears/Nose/Throat: Normal, Mucosa moist,Clear.  Neck/Thyroid: Supple, Non-tender, No carotid bruit, No lymphadenopathy, No JVD, Full range of motion.  Respiratory: Clear to auscultation bilaterally; No wheezes, rales or rhonchi,Non-labored respirations, Symmetrical chest wall expansion.  Cardiovascular: Regular rate and rhythm, S1/S2 normal, No murmurs, rubs or gallops.  Gastrointestinal: Soft, Non-tender, Non-distended, Normal bowel sounds, No palpable organomegaly.  Integumentary: Warm, Dry, Intact, No suspicious lesions or rashes.  Extremities: No clubbing, cyanosis or edema  Neurologic: No focal deficits, Cranial Nerves II-XII are grossly intact, Motor strength normal upper and lower extremities, Sensory exam intact.  Psychiatric: Normal interaction, Coherent speech, Appropriate affect       Results     Lab Results   Component Value Date    WBC 7.1 03/19/2023    HGB 14.9 03/19/2023    HCT 43.3 03/19/2023     03/19/2023    CHOL 248 (H) 08/22/2023    TRIG 114 08/22/2023    ALT 27 08/22/2023    AST 20 08/22/2023     08/22/2023    K 4.1 08/22/2023    CREATININE 0.72 08/22/2023    BUN 14.0 08/22/2023    CO2 23 08/22/2023    TSH 1.325 05/01/2023    INR 0.94 03/19/2023    HGBA1C 6.9 08/22/2023   Narrative & Impression  EXAMINATION:  CT CHEST ABDOMEN PELVIS WITH CONTRAST (XPD)     CLINICAL  HISTORY:  Weight loss, unintended;     TECHNIQUE:  Helical acquisition with axial, sagittal and coronal reformations obtained from the thoracic inlet to the pubic symphysis following the IV administration of contrast.     Automated tube current modulation, weight-based exposure dosing, and/or iterative reconstruction technique utilized to reach lowest reasonably achievable exposure rate.     DLP: 1020 mGy*cm     COMPARISON:  CT abdomen pelvis 07/19/2022, CT chest 10/15/2019, CT abdomen pelvis 05/31/2016     FINDINGS:  BASE OF NECK: Nodule at the left lobe of the thyroid has been present since at least 2019 exam.  This can be evaluated with outpatient sonogram if not previously performed.     HEART: Normal size. No effusion.     THORACIC VASCULATURE: There are calcifications at the aortic root.  Ascending aorta measures 3.3 cm in diameter.  Descending aorta measures 2.5 cm in diameter..Pulmonary arteries enhance normally.     ARGELIA/MEDIASTINUM: No enlarged lymph nodes by size criteria.  Questionable mild esophageal wall thickening.     AIRWAYS: Patent.     LUNGS/PLEURA: Clear lungs. No pleural effusion or thickening.     THORACIC SOFT TISSUES: Unremarkable.     LIVER: The liver is hypodense.  Probable focal fatty sparing adjacent to the gallbladder fossa.  Liver measures 18 cm craniocaudal at the midclavicular line.     BILIARY: No calcified gallstones.  Common hepatic duct measures 7 mm.  Similar to prior.     PANCREAS: No inflammatory change.     SPLEEN: Normal in size     ADRENALS: No mass.     KIDNEYS/URETERS: The kidneys enhance symmetrically.  No hydronephrosis.     GI TRACT/MESENTERY:  No evidence of bowel obstruction or inflammation. Appendix measures 5 mm, within normal limits.     PERITONEUM: No free fluid.No free air.     LYMPH NODES: No enlarged lymph nodes by size criteria.     ABDOMINOPELVIC VASCULATURE: Mild aortoiliac atherosclerosis.  Mild atherosclerotic narrowing of the proximal SMA.  Celiac and TIM  are patent.  Reflux into the left gonadal vein.     BLADDER: Unremarkable.     REPRODUCTIVE ORGANS: Enhancing myometrial lesion has been present since at least 2016, most compatible with fibroid.     ABDOMINAL WALL: Unremarkable.     BONES: Lower lumbar facet arthropathy.  Postop ACDF.     Impression:     1. Hepatomegaly with steatosis.  2. No acute cardiopulmonary abnormality  3. Mild atherosclerotic narrowing of the proximal SMA.        Electronically signed by: Isabel Shaw  Date:                                            03/19/2023  Time:                                           11:05    Narrative & Impression      - MAMMO DIGITAL SCREENING BILAT WITH AMY     BILATERAL DIGITAL SCREENING MAMMOGRAM 3D/2D WITH CAD: 7/11/2023  HISTORY: 63-year-old woman presents for screening mammogram. She has a history of bilateral breast implants status post explantation in 2015.       COMPARISONS: Comparison is made to exams dated:  12/24/2020 mammogram - [facility], 10/26/2016 mammogram, 8/20/2015 mammogram - [facility], 4/10/2017 mammogram, and 7/11/2017 mammogram.       TECHNIQUE: Digital mammography views were performed with tomosynthesis. Current study was evaluated with a Computer Aided Detection (CAD) system.      BREAST COMPOSITION: There are scattered areas of fibroglandular tissue.       FINDINGS:   No suspicious mass, asymmetry, distortion, or calcification is identified.         IMPRESSION: NEGATIVE  Right Breast: Negative (BI-RADS 1)  Left Breast: Negative (BI-RADS 1)     Recommendations:  Recommend continued annual screening mammography (with Digital Breast Tomosynthesis), according to American College of Radiology guidelines.          Dakota Mckeon M.D.            lm/:7/12/2023 08:19:02          letter sent: Mammography Normal    Mammogram BI-RADS: 1 Negative    Narrative & Impression  EXAMINATION:  DXA BONE DENSITY AXIAL SKELETON 1 OR MORE SITES     CLINICAL  HISTORY:  Asymptomatic menopausal state     TECHNIQUE:  DXA scanning was performed over the bilateral hip and lumbar spine.  Review of the images confirms satisfactory positioning and technique.     COMPARISON:  None     FINDINGS:  The L1 to L4 vertebral bone mineral density is equal to 1.073 g/cm squared with a T score of -1.0.  The measured bone mineral density in the lumbar spine could be artifactually elevated by hypertrophic degenerative changes.     The left femoral neck bone mineral density is equal to 0.926 g/cm squared with a T score of -0.6.  The right femoral neck bone mineral density is equal to 0.897 g/cm squared with a T score of -0.9.     Impression:     The measured bone mineral density in the lumbar spine and bilateral femoral necks corresponds with the low normal category according to world health organization criteria.     Consider FDA approved medical therapies in postmenopausal women and men aged 50 years and older, based on the following:     *A hip or vertebral (clinical or morphometric) fracture  *T score less than or equal to -2.5 at the femoral neck or spine after appropriate evaluation to exclude secondary causes.  *Low bone mass -- also known as osteopenia (T score between -1.0 and -2.5 at the femoral neck or spine) and a 10 year probability of hip fracture greater than or equal to 3% or a 10 year probability of major osteoporosis-related fracture greater than or equal to 20% based on the [place]-adapted WHO algorithm.  *Clinician's judgment and/or patient preference may indicate treatment for people with 10 year fracture probabilities is above or below these levels.        Electronically signed by: Parag Ku MD  Date:                                            07/11/2023  Time:                                           15:19    Narrative & Impression  EXAMINATION:  XR LUMBAR SPINE 2 OR 3 VIEWS     CPT: 55249     CLINICAL HISTORY:  Lumbago with sciatica, left side     FINDINGS:  There are 5  non-rib-bearing lumbar type vertebral bodies.  There is grade 1 anterolisthesis of L4 over L5 and L5 over S1.  The vertebral body heights are maintained.  There is mild disc height loss at L5-S1 with small multilevel marginal osteophytes.  Mild facet arthropathy is present in the lower lumbar spine.  There are scattered vascular calcifications.     Impression:     Degenerative changes        Electronically signed by: Nilo Gonzalez  Date:                                            06/09/2023  Time:                                           13:55    Assessment and Plan (including Health Maintenance)     Plan:     1. Wellness examination  Cervical Cancer Screening - Last Pap/pelvic on 7/6/23. Follow up with GYN Clinic for annual Pap/Pelvic.  Breast Cancer Screening - Last Mammogram on 7/12/23. Birads 1. Follow up annually.  Colon Cancer Screening - Cologuard ordered.  Osteoporosis Screening - Last DEXA on 7/12/23. Results show osteopenia. Follow up in 2 years.  Vaccinations: Flu - / Covid - / Pneumonia - / Shingles - / Tetanus -declines all vaccine.  Labwork - UTD    2. Osteopenia of multiple sites    Begin Calcium 600mg twice per day and Vitamin D 2000IU daily.    Take meds as prescribed.  Maintain a healthy BMI of <30.  Weight bearing exercise such as walking or resistance training to build bones 5 times a week.  Perform strengthening, range of motion and low-impact aerobic exercises (walking, water aerobics, cycling) as recommended to control pain and improve joint function.  Avoid soda and excessive alcohol.  Use assistive devices as needed for ambulation and fall prevention.  Avoid falls by assessing home for loose cords and rugs, wearing proper footwear, and using proper lighting in rooms.  Stressed importance of continued Smoking Cessation as smoking speeds bone loss.   Repeat DEXA in 7/2025.      3. DDD (degenerative disc disease), lumbar  XR lumbar previously completed.  Referral to PT to  eval/treat.  Perform back stretches daily.   Avoid activities than increase back pain or stiffness.   Apply heating pad, ice pack, and BioFreeze as needed; alternate every 15-20 minutes.   Massage back to loosen muscles.   Continue tylenol arthritis/NSAID/muscle relaxer as needed.    - Ambulatory referral/consult to Physical/Occupational Therapy; Future    4. Neck pain  Hx cervical fusion in 2010.  Cervical xr today.  Referral to PT to eval/treat.   Perform neck stretches daily.   Avoid activities than increase neck pain or stiffness.   Apply heating pad, ice pack, and BioFreeze as needed; alternate every 15-20 minutes.   Massage neck to loosen neck muscles.   Continue tylenol arthritis/NSAID/muscle relaxer as needed.    - Ambulatory referral/consult to Physical/Occupational Therapy; Future  - X-Ray Cervical Spine 2 or 3 Views; Future    5. Chronic pain of left knee  XR left knee.  Perform knee exercises daily.   Avoid activities than increase knee pain or stiffness.   Apply heating pad, ice pack, and BioFreeze/topical capsaicin as needed; alternate every 15-20 minutes.   Continue tylenol arthritis/NSAID/muscle relaxer as needed.    - X-Ray Knee 3 View Left; Future    6. Overweight with body mass index (BMI) of 27 to 27.9 in adult  BMI 27. Goal BMI <25.  Aerobic exercise 150 minutes per week.  Avoid soda, simple sugars, sweets, excessive rice, pasta, potatoes or bread.   Choose brown options when available and portion control.  Limit fast foods and fried foods.   Choose complex carbs in moderation (ex: green, leafy vegetables, beans, oatmeal).  Eat plenty of fresh fruits and vegetables with lean meats daily.   Consider permanent healthy lifestyle changes.      - Hepatitis Panel, Acute; Future      Problem List Items Addressed This Visit          Neuro    DDD (degenerative disc disease), lumbar (Chronic)    Relevant Orders    Ambulatory referral/consult to Physical/Occupational Therapy       Endocrine    Overweight  with body mass index (BMI) of 27 to 27.9 in adult (Chronic)       Orthopedic    Osteopenia of multiple sites (Chronic)    Neck pain (Chronic)    Overview     Hx of cervical fusion 2010         Relevant Orders    Ambulatory referral/consult to Physical/Occupational Therapy    X-Ray Cervical Spine 2 or 3 Views       Other    Wellness examination - Primary     Other Visit Diagnoses       Chronic pain of left knee        Relevant Orders    X-Ray Knee 3 View Left    Routine screening for STI (sexually transmitted infection)        Relevant Orders    HIV 1/2 Ag/Ab (4th Gen)    Hepatitis Panel, Acute              Health Maintenance Due   Topic Date Due    Hepatitis C Screening  Never done    Foot Exam  Never done    HIV Screening  Never done    Colorectal Cancer Screening  Never done    Shingles Vaccine (1 of 2) Never done    TETANUS VACCINE  09/12/2021    COVID-19 Vaccine (2 - Booster for Theresa series) 01/14/2022       Follow up in about 4 weeks (around 9/19/2023) for Follow up for xray results..        Signature:  RACHEAL Lerner  [facility]  [address] [facility] [id number]

## 2023-08-28 ENCOUNTER — HOSPITAL ENCOUNTER (EMERGENCY)
Facility: HOSPITAL | Age: 63
Discharge: HOME | End: 2023-08-29
Attending: EMERGENCY MEDICINE
Payer: MEDICAID

## 2023-08-28 DIAGNOSIS — S16.1XXA ACUTE STRAIN OF NECK MUSCLE, INITIAL ENCOUNTER: ICD-10-CM

## 2023-08-28 DIAGNOSIS — M25.562 ACUTE PAIN OF LEFT KNEE: ICD-10-CM

## 2023-08-28 DIAGNOSIS — M25.552 ACUTE PAIN OF LEFT HIP: ICD-10-CM
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
## 2023-08-28 DIAGNOSIS — M54.50 ACUTE BILATERAL LOW BACK PAIN WITHOUT SCIATICA: ICD-10-CM

## 2023-08-28 DIAGNOSIS — G44.319 ACUTE POST-TRAUMATIC HEADACHE, NOT INTRACTABLE: Primary | ICD-10-CM

## 2023-08-28 PROCEDURE — 96375 TX/PRO/DX INJ NEW DRUG ADDON: CPT

## 2023-08-28 PROCEDURE — 25000003 PHARM REV CODE 250: Performed by: EMERGENCY MEDICINE

## 2023-08-28 PROCEDURE — 96374 THER/PROPH/DIAG INJ IV PUSH: CPT

## 2023-08-28 PROCEDURE — 99285 EMERGENCY DEPT VISIT HI MDM: CPT | Mod: 25

## 2023-08-28 PROCEDURE — 25000003 PHARM REV CODE 250: Performed by: NURSE PRACTITIONER

## 2023-08-28 RX ORDER — HYDROCODONE BITARTRATE AND ACETAMINOPHEN 5; 325 MG/1; MG/1
1 TABLET ORAL
Status: COMPLETED | OUTPATIENT
Start: 2023-08-28 | End: 2023-08-28

## 2023-08-28 RX ORDER — ONDANSETRON 4 MG/1
4 TABLET, ORALLY DISINTEGRATING ORAL
Status: COMPLETED | OUTPATIENT
Start: 2023-08-28 | End: 2023-08-28

## 2023-08-28 RX ORDER — CYCLOBENZAPRINE HCL 10 MG
10 TABLET ORAL
Status: COMPLETED | OUTPATIENT
Start: 2023-08-28 | End: 2023-08-28

## 2023-08-28 RX ADMIN — CYCLOBENZAPRINE 10 MG: 10 TABLET, FILM COATED ORAL at 10:08

## 2023-08-28 RX ADMIN — ONDANSETRON 4 MG: 4 TABLET, ORALLY DISINTEGRATING ORAL at 10:08

## 2023-08-28 RX ADMIN — HYDROCODONE BITARTRATE AND ACETAMINOPHEN 1 TABLET: 5; 325 TABLET ORAL at 10:08

## 2023-08-28 NOTE — Clinical Note
"Janet"Alvarado Alvarez was seen and treated in our emergency department on 8/28/2023.  She may return to work on 09/01/2023.       If you have any questions or concerns, please don't hesitate to call.      Ekta Kovacs MD"

## 2023-08-28 NOTE — Clinical Note
"Janet Ramirez" Antonio was seen and treated in our emergency department on 8/28/2023.  She may return to work on 09/04/2023.       If you have any questions or concerns, please don't hesitate to call.       RN    "

## 2023-08-29 VITALS
OXYGEN SATURATION: 95 % | DIASTOLIC BLOOD PRESSURE: 86 MMHG | SYSTOLIC BLOOD PRESSURE: 134 MMHG | HEIGHT: 64 IN | BODY MASS INDEX: 27.31 KG/M2 | TEMPERATURE: 98 F | RESPIRATION RATE: 18 BRPM | HEART RATE: 59 BPM | WEIGHT: 160 LBS

## 2023-08-29 LAB
ALBUMIN SERPL-MCNC: 4.6 G/DL (ref 3.4–4.8)
ALBUMIN/GLOB SERPL: 1.4 RATIO (ref 1.1–2)
ALP SERPL-CCNC: 91 UNIT/L (ref 40–150)
ALT SERPL-CCNC: 33 UNIT/L (ref 0–55)
AST SERPL-CCNC: 25 UNIT/L (ref 5–34)
BASOPHILS # BLD AUTO: 0.07 X10(3)/MCL
BASOPHILS NFR BLD AUTO: 0.7 %
BILIRUB SERPL-MCNC: 0.6 MG/DL
BUN SERPL-MCNC: 13.4 MG/DL (ref 9.8–20.1)
CALCIUM SERPL-MCNC: 10.2 MG/DL (ref 8.4–10.2)
CHLORIDE SERPL-SCNC: 103 MMOL/L (ref 98–107)
CO2 SERPL-SCNC: 26 MMOL/L (ref 23–31)
CREAT SERPL-MCNC: 0.81 MG/DL (ref 0.55–1.02)
EOSINOPHIL # BLD AUTO: 0.09 X10(3)/MCL (ref 0–0.9)
EOSINOPHIL NFR BLD AUTO: 0.9 %
ERYTHROCYTE [DISTWIDTH] IN BLOOD BY AUTOMATED COUNT: 12.6 % (ref 11.5–17)
GFR SERPLBLD CREATININE-BSD FMLA CKD-EPI: >60 MLS/MIN/1.73/M2
GLOBULIN SER-MCNC: 3.2 GM/DL (ref 2.4–3.5)
GLUCOSE SERPL-MCNC: 126 MG/DL (ref 82–115)
HCT VFR BLD AUTO: 46.1 % (ref 37–47)
HGB BLD-MCNC: 15.6 G/DL (ref 12–16)
IMM GRANULOCYTES # BLD AUTO: 0.05 X10(3)/MCL (ref 0–0.04)
IMM GRANULOCYTES NFR BLD AUTO: 0.5 %
LYMPHOCYTES # BLD AUTO: 2.75 X10(3)/MCL (ref 0.6–4.6)
LYMPHOCYTES NFR BLD AUTO: 28.3 %
MCH RBC QN AUTO: 30.2 PG (ref 27–31)
MCHC RBC AUTO-ENTMCNC: 33.8 G/DL (ref 33–36)
MCV RBC AUTO: 89.3 FL (ref 80–94)
MONOCYTES # BLD AUTO: 0.68 X10(3)/MCL (ref 0.1–1.3)
MONOCYTES NFR BLD AUTO: 7 %
NEUTROPHILS # BLD AUTO: 6.08 X10(3)/MCL (ref 2.1–9.2)
NEUTROPHILS NFR BLD AUTO: 62.6 %
NRBC BLD AUTO-RTO: 0 %
PLATELET # BLD AUTO: 298 X10(3)/MCL (ref 130–400)
PMV BLD AUTO: 8.6 FL (ref 7.4–10.4)
POTASSIUM SERPL-SCNC: 4.5 MMOL/L (ref 3.5–5.1)
PROT SERPL-MCNC: 7.8 GM/DL (ref 5.8–7.6)
RBC # BLD AUTO: 5.16 X10(6)/MCL (ref 4.2–5.4)
SODIUM SERPL-SCNC: 141 MMOL/L (ref 136–145)
WBC # SPEC AUTO: 9.72 X10(3)/MCL (ref 4.5–11.5)

## 2023-08-29 PROCEDURE — 63600175 PHARM REV CODE 636 W HCPCS: Performed by: EMERGENCY MEDICINE

## 2023-08-29 PROCEDURE — 85025 COMPLETE CBC W/AUTO DIFF WBC: CPT | Performed by: EMERGENCY MEDICINE

## 2023-08-29 PROCEDURE — 25500020 PHARM REV CODE 255: Performed by: EMERGENCY MEDICINE

## 2023-08-29 PROCEDURE — 80053 COMPREHEN METABOLIC PANEL: CPT | Performed by: EMERGENCY MEDICINE

## 2023-08-29 RX ORDER — MORPHINE SULFATE 4 MG/ML
4 INJECTION, SOLUTION INTRAMUSCULAR; INTRAVENOUS
Status: COMPLETED | OUTPATIENT
Start: 2023-08-29 | End: 2023-08-29

## 2023-08-29 RX ORDER — TIZANIDINE 4 MG/1
4 TABLET ORAL EVERY 6 HOURS PRN
Qty: 30 TABLET | Refills: 0 | Status: SHIPPED | OUTPATIENT
Start: 2023-08-29 | End: 2023-09-27 | Stop reason: ALTCHOICE

## 2023-08-29 RX ORDER — ONDANSETRON 2 MG/ML
4 INJECTION INTRAMUSCULAR; INTRAVENOUS
Status: COMPLETED | OUTPATIENT
Start: 2023-08-29 | End: 2023-08-29

## 2023-08-29 RX ADMIN — MORPHINE SULFATE 4 MG: 4 INJECTION INTRAVENOUS at 12:08

## 2023-08-29 RX ADMIN — IOPAMIDOL 100 ML: 755 INJECTION, SOLUTION INTRAVENOUS at 01:08

## 2023-08-29 RX ADMIN — ONDANSETRON 4 MG: 2 INJECTION INTRAMUSCULAR; INTRAVENOUS at 12:08
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
## 2023-08-30 ENCOUNTER — PATIENT OUTREACH (OUTPATIENT)
Dept: EMERGENCY MEDICINE | Facility: HOSPITAL | Age: 63
End: 2023-08-30
Payer: MEDICAID

## 2023-08-31 ENCOUNTER — TELEPHONE (OUTPATIENT)
Dept: INTERNAL MEDICINE | Facility: CLINIC | Age: 63
End: 2023-08-31
Payer: MEDICAID

## 2023-08-31 ENCOUNTER — PATIENT OUTREACH (OUTPATIENT)
Dept: EMERGENCY MEDICINE | Facility: HOSPITAL | Age: 63
End: 2023-08-31
Payer: MEDICAID
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
## 2023-08-31 NOTE — TELEPHONE ENCOUNTER
Spoke to pt via phone, explained earliest appt is what she is already scheduled for which is 9/27. Encouraged pt to report to [facility] [place] if needed before then once pt states she is in extreme pain and can't wait until then to see provider. Explained there are no available appointments at this time. Pt states she will report to [facility] on tomorrow. Provider made aware.

## 2023-09-01 ENCOUNTER — HOSPITAL ENCOUNTER (EMERGENCY)
Facility: HOSPITAL | Age: 63
Discharge: HOME | End: 2023-09-01
Attending: FAMILY MEDICINE
Payer: MEDICAID

## 2023-09-01 VITALS
TEMPERATURE: 98 F | OXYGEN SATURATION: 97 % | RESPIRATION RATE: 20 BRPM | BODY MASS INDEX: 27.42 KG/M2 | WEIGHT: 160.63 LBS | HEIGHT: 64 IN | SYSTOLIC BLOOD PRESSURE: 140 MMHG | DIASTOLIC BLOOD PRESSURE: 77 MMHG | HEART RATE: 63 BPM

## 2023-09-01 DIAGNOSIS — S16.1XXD STRAIN OF NECK MUSCLE, SUBSEQUENT ENCOUNTER: ICD-10-CM
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
## 2023-09-01 PROCEDURE — 99284 EMERGENCY DEPT VISIT MOD MDM: CPT

## 2023-09-01 PROCEDURE — 96372 THER/PROPH/DIAG INJ SC/IM: CPT | Performed by: PHYSICIAN ASSISTANT

## 2023-09-01 PROCEDURE — 63600175 PHARM REV CODE 636 W HCPCS: Performed by: PHYSICIAN ASSISTANT

## 2023-09-01 RX ORDER — METHOCARBAMOL 500 MG/1
500 TABLET, FILM COATED ORAL 3 TIMES DAILY PRN
Qty: 15 TABLET | Refills: 0 | Status: SHIPPED | OUTPATIENT
Start: 2023-09-01 | End: 2023-09-06

## 2023-09-01 RX ORDER — KETOROLAC TROMETHAMINE 10 MG/1
10 TABLET, FILM COATED ORAL EVERY 6 HOURS PRN
Qty: 20 TABLET | Refills: 0 | Status: SHIPPED | OUTPATIENT
Start: 2023-09-01 | End: 2023-09-27 | Stop reason: ALTCHOICE

## 2023-09-01 RX ORDER — KETOROLAC TROMETHAMINE 30 MG/ML
30 INJECTION, SOLUTION INTRAMUSCULAR; INTRAVENOUS
Status: COMPLETED | OUTPATIENT
Start: 2023-09-01 | End: 2023-09-01

## 2023-09-01 RX ADMIN — KETOROLAC TROMETHAMINE 30 MG: 30 INJECTION, SOLUTION INTRAMUSCULAR; INTRAVENOUS at 08:09

## 2023-09-02 NOTE — DISCHARGE INSTRUCTIONS
Hold tizanidine while taking robaxin (methocarbamol)  Do not take toradol with ibuprofen or other NSAIDs. Take with food.     It is important that you follow up with your primary care provider or specialist if indicated for further evaluation, workup, and treatment as necessary. The exam and treatment you received in Emergency Department was for an urgent problem and NOT INTENDED AS COMPLETE CARE. It is important that you FOLLOW UP with a doctor for ongoing care. If your symptoms become WORSE or you DO NOT IMPROVE and you are unable to reach your health care provider, you should RETURN to the Emergency Department.
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
## 2023-09-14 ENCOUNTER — OFFICE VISIT (OUTPATIENT)
Dept: BEHAVIORAL HEALTH | Facility: CLINIC | Age: 63
End: 2023-09-14
Payer: MEDICAID

## 2023-09-14 VITALS
HEART RATE: 82 BPM | SYSTOLIC BLOOD PRESSURE: 136 MMHG | HEIGHT: 64 IN | DIASTOLIC BLOOD PRESSURE: 75 MMHG | BODY MASS INDEX: 27.08 KG/M2 | TEMPERATURE: 98 F | WEIGHT: 158.63 LBS

## 2023-09-14 DIAGNOSIS — F41.1 GAD (GENERALIZED ANXIETY DISORDER): Primary | ICD-10-CM

## 2023-09-14 DIAGNOSIS — Z79.899 MEDICATION MANAGEMENT: ICD-10-CM

## 2023-09-14 DIAGNOSIS — F51.04 PSYCHOPHYSIOLOGICAL INSOMNIA: Chronic | ICD-10-CM

## 2023-09-14 PROCEDURE — 1159F PR MEDICATION LIST DOCUMENTED IN MEDICAL RECORD: ICD-10-PCS | Mod: CPTII,,, | Performed by: NURSE PRACTITIONER

## 2023-09-14 PROCEDURE — 3066F NEPHROPATHY DOC TX: CPT | Mod: CPTII,,, | Performed by: NURSE PRACTITIONER

## 2023-09-14 PROCEDURE — 1159F MED LIST DOCD IN RCRD: CPT | Mod: CPTII,,, | Performed by: NURSE PRACTITIONER

## 2023-09-14 PROCEDURE — 1160F PR REVIEW ALL MEDS BY PRESCRIBER/CLIN PHARMACIST DOCUMENTED: ICD-10-PCS | Mod: CPTII,,, | Performed by: NURSE PRACTITIONER

## 2023-09-14 PROCEDURE — 3061F NEG MICROALBUMINURIA REV: CPT | Mod: CPTII,,, | Performed by: NURSE PRACTITIONER

## 2023-09-14 PROCEDURE — 4010F PR ACE/ARB THEARPY RXD/TAKEN: ICD-10-PCS | Mod: CPTII,,, | Performed by: NURSE PRACTITIONER

## 2023-09-14 PROCEDURE — 4010F ACE/ARB THERAPY RXD/TAKEN: CPT | Mod: CPTII,,, | Performed by: NURSE PRACTITIONER

## 2023-09-14 PROCEDURE — 3075F PR MOST RECENT SYSTOLIC BLOOD PRESS GE 130-139MM HG: ICD-10-PCS | Mod: CPTII,,, | Performed by: NURSE PRACTITIONER

## 2023-09-14 PROCEDURE — 3078F PR MOST RECENT DIASTOLIC BLOOD PRESSURE < 80 MM HG: ICD-10-PCS | Mod: CPTII,,, | Performed by: NURSE PRACTITIONER

## 2023-09-14 PROCEDURE — 3066F PR DOCUMENTATION OF TREATMENT FOR NEPHROPATHY: ICD-10-PCS | Mod: CPTII,,, | Performed by: NURSE PRACTITIONER

## 2023-09-14 PROCEDURE — 3044F PR MOST RECENT HEMOGLOBIN A1C LEVEL <7.0%: ICD-10-PCS | Mod: CPTII,,, | Performed by: NURSE PRACTITIONER

## 2023-09-14 PROCEDURE — 3061F PR NEG MICROALBUMINURIA RESULT DOCUMENTED/REVIEW: ICD-10-PCS | Mod: CPTII,,, | Performed by: NURSE PRACTITIONER

## 2023-09-14 PROCEDURE — 99215 PR OFFICE/OUTPT VISIT, EST, LEVL V, 40-54 MIN: ICD-10-PCS | Mod: S$PBB,,, | Performed by: NURSE PRACTITIONER

## 2023-09-14 PROCEDURE — 99214 OFFICE O/P EST MOD 30 MIN: CPT | Mod: PBBFAC,PN | Performed by: NURSE PRACTITIONER

## 2023-09-14 PROCEDURE — 99215 OFFICE O/P EST HI 40 MIN: CPT | Mod: S$PBB,,, | Performed by: NURSE PRACTITIONER

## 2023-09-14 PROCEDURE — 3075F SYST BP GE 130 - 139MM HG: CPT | Mod: CPTII,,, | Performed by: NURSE PRACTITIONER

## 2023-09-14 PROCEDURE — 3044F HG A1C LEVEL LT 7.0%: CPT | Mod: CPTII,,, | Performed by: NURSE PRACTITIONER

## 2023-09-14 PROCEDURE — 3078F DIAST BP <80 MM HG: CPT | Mod: CPTII,,, | Performed by: NURSE PRACTITIONER

## 2023-09-14 PROCEDURE — 3008F PR BODY MASS INDEX (BMI) DOCUMENTED: ICD-10-PCS | Mod: CPTII,,, | Performed by: NURSE PRACTITIONER

## 2023-09-14 PROCEDURE — 1160F RVW MEDS BY RX/DR IN RCRD: CPT | Mod: CPTII,,, | Performed by: NURSE PRACTITIONER

## 2023-09-14 PROCEDURE — 3008F BODY MASS INDEX DOCD: CPT | Mod: CPTII,,, | Performed by: NURSE PRACTITIONER

## 2023-09-14 RX ORDER — ALPRAZOLAM 0.25 MG/1
0.25 TABLET ORAL NIGHTLY PRN
Qty: 14 TABLET | Refills: 0 | Status: SHIPPED | OUTPATIENT
Start: 2023-09-14 | End: 2023-10-26 | Stop reason: ALTCHOICE

## 2023-09-14 RX ORDER — BUSPIRONE HYDROCHLORIDE 5 MG/1
5 TABLET ORAL 2 TIMES DAILY
Qty: 90 TABLET | Refills: 3 | Status: SHIPPED | OUTPATIENT
Start: 2023-09-14 | End: 2023-10-26
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
## 2023-09-18 ENCOUNTER — LAB VISIT (OUTPATIENT)
Dept: LAB | Facility: HOSPITAL | Age: 63
End: 2023-09-18
Attending: NURSE PRACTITIONER
Payer: MEDICAID

## 2023-09-18 ENCOUNTER — PATIENT OUTREACH (OUTPATIENT)
Dept: EMERGENCY MEDICINE | Facility: HOSPITAL | Age: 63
End: 2023-09-18
Payer: MEDICAID

## 2023-09-18 DIAGNOSIS — Z79.899 MEDICATION MANAGEMENT: ICD-10-CM

## 2023-09-18 LAB
AMPHET UR QL SCN: NEGATIVE
BARBITURATE SCN PRESENT UR: NEGATIVE
BENZODIAZ UR QL SCN: NEGATIVE
CANNABINOIDS UR QL SCN: NEGATIVE
COCAINE UR QL SCN: NEGATIVE
FENTANYL UR QL SCN: NEGATIVE
MDMA UR QL SCN: NEGATIVE
OPIATES UR QL SCN: NEGATIVE
PCP UR QL: NEGATIVE
PH UR: 6 [PH] (ref 3–11)

## 2023-09-18 PROCEDURE — 80307 DRUG TEST PRSMV CHEM ANLYZR: CPT

## 2023-09-27 ENCOUNTER — OFFICE VISIT (OUTPATIENT)
Dept: INTERNAL MEDICINE | Facility: CLINIC | Age: 63
End: 2023-09-27
Payer: MEDICAID

## 2023-09-27 VITALS
HEART RATE: 72 BPM | BODY MASS INDEX: 27.38 KG/M2 | SYSTOLIC BLOOD PRESSURE: 120 MMHG | DIASTOLIC BLOOD PRESSURE: 71 MMHG | WEIGHT: 160.38 LBS | HEIGHT: 64 IN | RESPIRATION RATE: 20 BRPM | TEMPERATURE: 98 F

## 2023-09-27 DIAGNOSIS — R13.10 DYSPHAGIA, UNSPECIFIED TYPE: ICD-10-CM

## 2023-09-27 DIAGNOSIS — E04.1 THYROID NODULE: ICD-10-CM

## 2023-09-27 DIAGNOSIS — F41.8 MIXED ANXIETY AND DEPRESSIVE DISORDER: Primary | Chronic | ICD-10-CM
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
## 2023-09-27 DIAGNOSIS — Z87.891 HX OF TOBACCO USE, PRESENTING HAZARDS TO HEALTH: ICD-10-CM

## 2023-09-27 PROCEDURE — 99215 OFFICE O/P EST HI 40 MIN: CPT | Mod: PBBFAC | Performed by: NURSE PRACTITIONER

## 2023-09-27 PROCEDURE — 99213 PR OFFICE/OUTPT VISIT, EST, LEVL III, 20-29 MIN: ICD-10-PCS | Mod: S$PBB,,, | Performed by: NURSE PRACTITIONER

## 2023-09-27 PROCEDURE — 99213 OFFICE O/P EST LOW 20 MIN: CPT | Mod: S$PBB,,, | Performed by: NURSE PRACTITIONER

## 2023-09-27 RX ORDER — METHOCARBAMOL 500 MG/1
500 TABLET, FILM COATED ORAL 2 TIMES DAILY PRN
COMMUNITY
Start: 2023-09-07 | End: 2023-10-27 | Stop reason: ALTCHOICE

## 2023-09-27 RX ORDER — UBIDECARENONE 50 MG
CAPSULE ORAL
COMMUNITY
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
## 2023-10-02 LAB — NONINV COLON CA DNA+OCC BLD SCRN STL QL: POSITIVE

## 2023-10-03 ENCOUNTER — HOSPITAL ENCOUNTER (OUTPATIENT)
Dept: RADIOLOGY | Facility: HOSPITAL | Age: 63
Discharge: HOME | End: 2023-10-03
Attending: NURSE PRACTITIONER
Payer: MEDICAID

## 2023-10-03 DIAGNOSIS — R13.10 DYSPHAGIA, UNSPECIFIED TYPE: ICD-10-CM

## 2023-10-03 DIAGNOSIS — E04.1 THYROID NODULE: ICD-10-CM

## 2023-10-03 DIAGNOSIS — R19.5 POSITIVE COLORECTAL CANCER SCREENING USING COLOGUARD TEST: Primary | ICD-10-CM

## 2023-10-03 PROCEDURE — 74220 X-RAY XM ESOPHAGUS 1CNTRST: CPT | Mod: TC

## 2023-10-03 PROCEDURE — 76536 US EXAM OF HEAD AND NECK: CPT | Mod: TC

## 2023-10-03 NOTE — PROGRESS NOTES
Inform the pt that her cologuard was positive and I have referred her for a colonoscopy. Encourage her to keep her appt when scheduled. Thanks

## 2023-10-04 ENCOUNTER — TELEPHONE (OUTPATIENT)
Dept: INTERNAL MEDICINE | Facility: CLINIC | Age: 63
End: 2023-10-04
Payer: MEDICAID

## 2023-10-04 NOTE — TELEPHONE ENCOUNTER
----- Message from RACHEAL Blakely sent at 10/3/2023  9:53 AM CDT -----  Inform the pt that her cologuard was positive and I have referred her for a colonoscopy. Encourage her to keep her appt when scheduled. Thanks

## 2023-10-04 NOTE — TELEPHONE ENCOUNTER
Pt notified of positive cologuard results and notified that we referred to GI for colonoscopy. I stressed importance of scheduling colonoscopy and keeping appointment. Pt verbalized understanding.

## 2023-10-06 DIAGNOSIS — R19.5 POSITIVE COLORECTAL CANCER SCREENING USING COLOGUARD TEST: Primary | ICD-10-CM

## 2023-10-13 ENCOUNTER — OFFICE VISIT (OUTPATIENT)
Dept: URGENT CARE | Facility: CLINIC | Age: 63
End: 2023-10-13
Payer: MEDICAID

## 2023-10-13 VITALS
DIASTOLIC BLOOD PRESSURE: 78 MMHG | OXYGEN SATURATION: 98 % | HEART RATE: 65 BPM | HEIGHT: 64 IN | WEIGHT: 160.19 LBS | SYSTOLIC BLOOD PRESSURE: 146 MMHG | TEMPERATURE: 98 F | RESPIRATION RATE: 18 BRPM | BODY MASS INDEX: 27.35 KG/M2

## 2023-10-13 DIAGNOSIS — L29.0 ANAL PRURITUS: Primary | ICD-10-CM

## 2023-10-13 DIAGNOSIS — B76.9 HOOK WORM: ICD-10-CM

## 2023-10-13 PROCEDURE — 99203 PR OFFICE/OUTPT VISIT, NEW, LEVL III, 30-44 MIN: ICD-10-PCS | Mod: S$PBB,,, | Performed by: NURSE PRACTITIONER

## 2023-10-13 PROCEDURE — 87209 SMEAR COMPLEX STAIN: CPT | Performed by: NURSE PRACTITIONER

## 2023-10-13 PROCEDURE — 99203 OFFICE O/P NEW LOW 30 MIN: CPT | Mod: S$PBB,,, | Performed by: NURSE PRACTITIONER

## 2023-10-13 PROCEDURE — 99215 OFFICE O/P EST HI 40 MIN: CPT | Mod: PBBFAC | Performed by: NURSE PRACTITIONER

## 2023-10-13 RX ORDER — ONDANSETRON 4 MG/1
4 TABLET, ORALLY DISINTEGRATING ORAL EVERY 6 HOURS PRN
Qty: 16 TABLET | Refills: 0 | Status: SHIPPED | OUTPATIENT
Start: 2023-10-13 | End: 2023-10-17

## 2023-10-13 RX ORDER — ALBENDAZOLE 200 MG/1
400 TABLET, FILM COATED ORAL 2 TIMES DAILY WITH MEALS
Qty: 40 TABLET | Refills: 0 | Status: SHIPPED | OUTPATIENT
Start: 2023-10-13 | End: 2023-10-23

## 2023-10-13 RX ORDER — HYDROCORTISONE 1 %
CREAM (GRAM) TOPICAL 2 TIMES DAILY
Qty: 20 G | Refills: 0 | Status: ON HOLD | OUTPATIENT
Start: 2023-10-13 | End: 2023-11-03 | Stop reason: HOSPADM

## 2023-10-13 NOTE — PROGRESS NOTES
"Subjective:      Patient ID: Janet Anthony is a 63 y.o. female.    Vitals:  height is 5' 4" (1.626 m) and weight is 72.7 kg (160 lb 3.2 oz). Her oral temperature is 98.4 °F (36.9 °C). Her blood pressure is 146/78 (abnormal) and her pulse is 65. Her respiration is 18 and oxygen saturation is 98%.     Chief Complaint: Anal Itching (Patient reports rectal itching x1 week. Pt states dog has bad worms. Pt also had a barium enema done last week. Spoke to PCP, was instructed to come here for an order to bring a sample to the hospital.)    HPI as stated in CC. Pt also c/o intermittent abdominal discomfort x 2-3 days ago, Denies abd pain today. Pt has noted "flesh colored  hook worms" in my stool and my dogs stool, pt also showed a picture of light ambar colored stools in which this particular stool sample was 3 days after barium enema. Denies abd pain, n/v/d, fever, blood in stool anal trauma, or pain.    Skin:  Negative for erythema.      Objective:     Physical Exam   Constitutional: She is oriented to person, place, and time. She appears well-developed.  Non-toxic appearance. She does not appear ill. No distress.   HENT:   Head: Normocephalic and atraumatic.   Nose: Nose normal. No purulent discharge. Right sinus exhibits no maxillary sinus tenderness and no frontal sinus tenderness. Left sinus exhibits no maxillary sinus tenderness and no frontal sinus tenderness.   Mouth/Throat: Uvula is midline and mucous membranes are normal. Mucous membranes are moist. No oropharyngeal exudate, posterior oropharyngeal edema or tonsillar abscesses.   Eyes: Conjunctivae are normal. Right eye exhibits no discharge. Left eye exhibits no discharge. Extraocular movement intact   Neck: Neck supple. No neck rigidity present.   Cardiovascular: Normal rate and regular rhythm.   Pulmonary/Chest: Effort normal and breath sounds normal. No respiratory distress. She has no wheezes. She has no rales.   Abdominal: Bowel sounds are normal. She " exhibits no distension. Soft. There is no abdominal tenderness. There is no rebound, no guarding, no left CVA tenderness and no right CVA tenderness.   Genitourinary:         Comments: Pt refused exam     Lymphadenopathy:     She has no cervical adenopathy.   Neurological: no focal deficit. She is alert and oriented to person, place, and time.   Skin: Skin is warm, dry, not diaphoretic and no rash. Capillary refill takes less than 2 seconds. No erythema   Psychiatric: Her behavior is normal. Mood, judgment and thought content normal.   Nursing note and vitals reviewed.      Assessment:     1. Anal pruritus    2. Hook worm        Plan:   Return to urgent care in 2 to 3 days if symptoms are not improving, immediately if you develop any new or worsening symptoms.   - Increase your water intake to 5-6 bottles per day  - If you start to have fevers 100.4+, significant lower back/lower abdominal pain, vomiting, chills/body aches, or worsening bladder/urination symptoms - go to the ER.   Discussed treatment and follow up  F/uw ith PCP as discussed and keep júnior.   Anal pruritus  -     Stool Exam-Ova,Cysts,Parasites; Future; Expected date: 10/13/2023    Hook worm  -     Stool Exam-Ova,Cysts,Parasites; Future; Expected date: 10/13/2023  -     ondansetron (ZOFRAN-ODT) 4 MG TbDL; Take 1 tablet (4 mg total) by mouth every 6 (six) hours as needed (nausea).  Dispense: 16 tablet; Refill: 0    Other orders  -     hydrocortisone 1 % cream; Apply topically 2 (two) times daily.  Dispense: 20 g; Refill: 0  -     albendazole (ALBENZA) 200 mg Tab; Take 2 tablets (400 mg total) by mouth 2 (two) times daily with meals. for 10 days  Dispense: 40 tablet; Refill: 0

## 2023-10-13 NOTE — PATIENT INSTRUCTIONS
Please follow instructions on patient education material.      Return to urgent care in 2 to 3 days if symptoms are not improving, immediately if you develop any new or worsening symptoms.     - Increase your water intake to 5-6 bottles per day  - If you start to have fevers 100.4+, significant lower back/lower abdominal pain, vomiting, chills/body aches, or worsening bladder/urination symptoms - go to the ER.    Initial management consists of improving anal hygiene, avoiding moisture in the anal area, removing offending agents, dietary modification, and protection of skin. Reassurance and conservative treatment are successful .      We also suggest a barrier cream containing zinx oxide be applied to the anal area. In patients with significant pruritus, we suggest 1 percent hydrocortisone cream twice daily for one to two weeks to be used in conjunction with the barrier cream.     However, topical hydrocortisone should not be used for more than two weeks to avoid skin irritation

## 2023-10-16 DIAGNOSIS — Z12.11 SCREENING FOR COLON CANCER: Primary | ICD-10-CM

## 2023-10-16 LAB — O+P STL MICRO: NORMAL

## 2023-10-16 RX ORDER — POLYETHYLENE GLYCOL 3350, SODIUM SULFATE, SODIUM CHLORIDE, POTASSIUM CHLORIDE, SODIUM ASCORBATE, AND ASCORBIC ACID 7.5-2.691G
KIT ORAL
Qty: 1 KIT | Refills: 0 | Status: SHIPPED | OUTPATIENT
Start: 2023-10-16 | End: 2023-11-27

## 2023-10-24 ENCOUNTER — TELEPHONE (OUTPATIENT)
Dept: INTERNAL MEDICINE | Facility: CLINIC | Age: 63
End: 2023-10-24
Payer: MEDICAID

## 2023-10-24 NOTE — TELEPHONE ENCOUNTER
Pt needs to be a sooner appt than scheduled the end of Nov. Visit can be virtual. Please contact to schedule. Thanks

## 2023-10-26 ENCOUNTER — OFFICE VISIT (OUTPATIENT)
Dept: BEHAVIORAL HEALTH | Facility: CLINIC | Age: 63
End: 2023-10-26
Payer: MEDICAID

## 2023-10-26 VITALS
SYSTOLIC BLOOD PRESSURE: 126 MMHG | DIASTOLIC BLOOD PRESSURE: 68 MMHG | HEART RATE: 72 BPM | HEIGHT: 64 IN | BODY MASS INDEX: 27.31 KG/M2 | TEMPERATURE: 97 F | WEIGHT: 160 LBS

## 2023-10-26 DIAGNOSIS — F41.8 MIXED ANXIETY AND DEPRESSIVE DISORDER: Primary | Chronic | ICD-10-CM

## 2023-10-26 PROCEDURE — 3066F NEPHROPATHY DOC TX: CPT | Mod: CPTII,,, | Performed by: NURSE PRACTITIONER

## 2023-10-26 PROCEDURE — 1159F PR MEDICATION LIST DOCUMENTED IN MEDICAL RECORD: ICD-10-PCS | Mod: CPTII,,, | Performed by: NURSE PRACTITIONER

## 2023-10-26 PROCEDURE — 99214 OFFICE O/P EST MOD 30 MIN: CPT | Mod: PBBFAC,PN | Performed by: NURSE PRACTITIONER

## 2023-10-26 PROCEDURE — 99214 PR OFFICE/OUTPT VISIT, EST, LEVL IV, 30-39 MIN: ICD-10-PCS | Mod: S$PBB,,, | Performed by: NURSE PRACTITIONER

## 2023-10-26 PROCEDURE — 1159F MED LIST DOCD IN RCRD: CPT | Mod: CPTII,,, | Performed by: NURSE PRACTITIONER

## 2023-10-26 PROCEDURE — 3008F BODY MASS INDEX DOCD: CPT | Mod: CPTII,,, | Performed by: NURSE PRACTITIONER

## 2023-10-26 PROCEDURE — 3078F PR MOST RECENT DIASTOLIC BLOOD PRESSURE < 80 MM HG: ICD-10-PCS | Mod: CPTII,,, | Performed by: NURSE PRACTITIONER

## 2023-10-26 PROCEDURE — 3061F NEG MICROALBUMINURIA REV: CPT | Mod: CPTII,,, | Performed by: NURSE PRACTITIONER

## 2023-10-26 PROCEDURE — 3044F HG A1C LEVEL LT 7.0%: CPT | Mod: CPTII,,, | Performed by: NURSE PRACTITIONER

## 2023-10-26 PROCEDURE — 4010F ACE/ARB THERAPY RXD/TAKEN: CPT | Mod: CPTII,,, | Performed by: NURSE PRACTITIONER

## 2023-10-26 PROCEDURE — 99214 OFFICE O/P EST MOD 30 MIN: CPT | Mod: S$PBB,,, | Performed by: NURSE PRACTITIONER

## 2023-10-26 PROCEDURE — 4010F PR ACE/ARB THEARPY RXD/TAKEN: ICD-10-PCS | Mod: CPTII,,, | Performed by: NURSE PRACTITIONER

## 2023-10-26 PROCEDURE — 3044F PR MOST RECENT HEMOGLOBIN A1C LEVEL <7.0%: ICD-10-PCS | Mod: CPTII,,, | Performed by: NURSE PRACTITIONER

## 2023-10-26 PROCEDURE — 3008F PR BODY MASS INDEX (BMI) DOCUMENTED: ICD-10-PCS | Mod: CPTII,,, | Performed by: NURSE PRACTITIONER

## 2023-10-26 PROCEDURE — 3074F SYST BP LT 130 MM HG: CPT | Mod: CPTII,,, | Performed by: NURSE PRACTITIONER

## 2023-10-26 PROCEDURE — 3066F PR DOCUMENTATION OF TREATMENT FOR NEPHROPATHY: ICD-10-PCS | Mod: CPTII,,, | Performed by: NURSE PRACTITIONER

## 2023-10-26 PROCEDURE — 3078F DIAST BP <80 MM HG: CPT | Mod: CPTII,,, | Performed by: NURSE PRACTITIONER

## 2023-10-26 PROCEDURE — 3061F PR NEG MICROALBUMINURIA RESULT DOCUMENTED/REVIEW: ICD-10-PCS | Mod: CPTII,,, | Performed by: NURSE PRACTITIONER

## 2023-10-26 PROCEDURE — 3074F PR MOST RECENT SYSTOLIC BLOOD PRESSURE < 130 MM HG: ICD-10-PCS | Mod: CPTII,,, | Performed by: NURSE PRACTITIONER
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
## 2023-10-27 ENCOUNTER — OFFICE VISIT (OUTPATIENT)
Dept: INTERNAL MEDICINE | Facility: CLINIC | Age: 63
End: 2023-10-27
Payer: MEDICAID

## 2023-10-27 VITALS
TEMPERATURE: 98 F | HEIGHT: 64 IN | SYSTOLIC BLOOD PRESSURE: 113 MMHG | WEIGHT: 161.38 LBS | BODY MASS INDEX: 27.55 KG/M2 | DIASTOLIC BLOOD PRESSURE: 72 MMHG | RESPIRATION RATE: 18 BRPM | HEART RATE: 65 BPM

## 2023-10-27 DIAGNOSIS — R10.9 ABDOMINAL CRAMPING: ICD-10-CM

## 2023-10-27 DIAGNOSIS — E04.2 MULTIPLE THYROID NODULES: Chronic | ICD-10-CM

## 2023-10-27 PROCEDURE — 4010F ACE/ARB THERAPY RXD/TAKEN: CPT | Mod: CPTII,,, | Performed by: NURSE PRACTITIONER

## 2023-10-27 PROCEDURE — 99215 OFFICE O/P EST HI 40 MIN: CPT | Mod: PBBFAC | Performed by: NURSE PRACTITIONER

## 2023-10-27 PROCEDURE — 3061F PR NEG MICROALBUMINURIA RESULT DOCUMENTED/REVIEW: ICD-10-PCS | Mod: CPTII,,, | Performed by: NURSE PRACTITIONER

## 2023-10-27 PROCEDURE — 1159F PR MEDICATION LIST DOCUMENTED IN MEDICAL RECORD: ICD-10-PCS | Mod: CPTII,,, | Performed by: NURSE PRACTITIONER

## 2023-10-27 PROCEDURE — 3044F HG A1C LEVEL LT 7.0%: CPT | Mod: CPTII,,, | Performed by: NURSE PRACTITIONER

## 2023-10-27 PROCEDURE — 99214 PR OFFICE/OUTPT VISIT, EST, LEVL IV, 30-39 MIN: ICD-10-PCS | Mod: S$PBB,,, | Performed by: NURSE PRACTITIONER

## 2023-10-27 PROCEDURE — 3078F DIAST BP <80 MM HG: CPT | Mod: CPTII,,, | Performed by: NURSE PRACTITIONER

## 2023-10-27 PROCEDURE — 3061F NEG MICROALBUMINURIA REV: CPT | Mod: CPTII,,, | Performed by: NURSE PRACTITIONER

## 2023-10-27 PROCEDURE — 3008F BODY MASS INDEX DOCD: CPT | Mod: CPTII,,, | Performed by: NURSE PRACTITIONER

## 2023-10-27 PROCEDURE — 3044F PR MOST RECENT HEMOGLOBIN A1C LEVEL <7.0%: ICD-10-PCS | Mod: CPTII,,, | Performed by: NURSE PRACTITIONER

## 2023-10-27 PROCEDURE — 3074F PR MOST RECENT SYSTOLIC BLOOD PRESSURE < 130 MM HG: ICD-10-PCS | Mod: CPTII,,, | Performed by: NURSE PRACTITIONER

## 2023-10-27 PROCEDURE — 1159F MED LIST DOCD IN RCRD: CPT | Mod: CPTII,,, | Performed by: NURSE PRACTITIONER

## 2023-10-27 PROCEDURE — 3008F PR BODY MASS INDEX (BMI) DOCUMENTED: ICD-10-PCS | Mod: CPTII,,, | Performed by: NURSE PRACTITIONER

## 2023-10-27 PROCEDURE — 1160F RVW MEDS BY RX/DR IN RCRD: CPT | Mod: CPTII,,, | Performed by: NURSE PRACTITIONER

## 2023-10-27 PROCEDURE — 3066F NEPHROPATHY DOC TX: CPT | Mod: CPTII,,, | Performed by: NURSE PRACTITIONER

## 2023-10-27 PROCEDURE — 1160F PR REVIEW ALL MEDS BY PRESCRIBER/CLIN PHARMACIST DOCUMENTED: ICD-10-PCS | Mod: CPTII,,, | Performed by: NURSE PRACTITIONER

## 2023-10-27 PROCEDURE — 3078F PR MOST RECENT DIASTOLIC BLOOD PRESSURE < 80 MM HG: ICD-10-PCS | Mod: CPTII,,, | Performed by: NURSE PRACTITIONER

## 2023-10-27 PROCEDURE — 3066F PR DOCUMENTATION OF TREATMENT FOR NEPHROPATHY: ICD-10-PCS | Mod: CPTII,,, | Performed by: NURSE PRACTITIONER

## 2023-10-27 PROCEDURE — 3074F SYST BP LT 130 MM HG: CPT | Mod: CPTII,,, | Performed by: NURSE PRACTITIONER

## 2023-10-27 PROCEDURE — 99214 OFFICE O/P EST MOD 30 MIN: CPT | Mod: S$PBB,,, | Performed by: NURSE PRACTITIONER

## 2023-10-27 PROCEDURE — 4010F PR ACE/ARB THEARPY RXD/TAKEN: ICD-10-PCS | Mod: CPTII,,, | Performed by: NURSE PRACTITIONER

## 2023-10-27 RX ORDER — DICYCLOMINE HYDROCHLORIDE 10 MG/1
10 CAPSULE ORAL 3 TIMES DAILY PRN
Qty: 30 CAPSULE | Refills: 0 | Status: SHIPPED | OUTPATIENT
Start: 2023-10-27 | End: 2023-11-27

## 2023-10-27 RX ORDER — CYCLOBENZAPRINE HCL 10 MG
10 TABLET ORAL 2 TIMES DAILY PRN
COMMUNITY
End: 2024-03-21
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
## 2023-11-03 ENCOUNTER — ANESTHESIA (OUTPATIENT)
Dept: ENDOSCOPY | Facility: HOSPITAL | Age: 63
End: 2023-11-03
Payer: MEDICAID

## 2023-11-03 ENCOUNTER — HOSPITAL ENCOUNTER (OUTPATIENT)
Facility: HOSPITAL | Age: 63
Discharge: HOME | End: 2023-11-03
Attending: INTERNAL MEDICINE | Admitting: INTERNAL MEDICINE
Payer: MEDICAID

## 2023-11-03 ENCOUNTER — ANESTHESIA EVENT (OUTPATIENT)
Dept: ENDOSCOPY | Facility: HOSPITAL | Age: 63
End: 2023-11-03
Payer: MEDICAID

## 2023-11-03 DIAGNOSIS — R19.5 POSITIVE COLORECTAL CANCER SCREENING USING COLOGUARD TEST: ICD-10-CM

## 2023-11-03 DIAGNOSIS — Z12.11 ENCOUNTER FOR SCREENING COLONOSCOPY: Primary | ICD-10-CM

## 2023-11-03 LAB — POCT GLUCOSE: 162 MG/DL (ref 70–110)

## 2023-11-03 PROCEDURE — D9220A PRA ANESTHESIA: Mod: ,,, | Performed by: NURSE ANESTHETIST, CERTIFIED REGISTERED

## 2023-11-03 PROCEDURE — 27201423 OPTIME MED/SURG SUP & DEVICES STERILE SUPPLY: Performed by: INTERNAL MEDICINE

## 2023-11-03 PROCEDURE — 88305 TISSUE EXAM BY PATHOLOGIST: CPT | Mod: TC | Performed by: INTERNAL MEDICINE

## 2023-11-03 PROCEDURE — 45385 COLONOSCOPY W/LESION REMOVAL: CPT | Performed by: INTERNAL MEDICINE

## 2023-11-03 PROCEDURE — 63600175 PHARM REV CODE 636 W HCPCS: Performed by: SPECIALIST

## 2023-11-03 PROCEDURE — 63600175 PHARM REV CODE 636 W HCPCS: Performed by: INTERNAL MEDICINE

## 2023-11-03 PROCEDURE — 37000008 HC ANESTHESIA 1ST 15 MINUTES: Performed by: INTERNAL MEDICINE

## 2023-11-03 PROCEDURE — 63600175 PHARM REV CODE 636 W HCPCS: Performed by: NURSE ANESTHETIST, CERTIFIED REGISTERED

## 2023-11-03 PROCEDURE — 25000003 PHARM REV CODE 250: Performed by: NURSE ANESTHETIST, CERTIFIED REGISTERED

## 2023-11-03 PROCEDURE — 45381 COLONOSCOPY SUBMUCOUS NJX: CPT | Performed by: INTERNAL MEDICINE

## 2023-11-03 PROCEDURE — 37000009 HC ANESTHESIA EA ADD 15 MINS: Performed by: INTERNAL MEDICINE

## 2023-11-03 PROCEDURE — D9220A PRA ANESTHESIA: ICD-10-PCS | Mod: ,,, | Performed by: NURSE ANESTHETIST, CERTIFIED REGISTERED

## 2023-11-03 RX ORDER — LIDOCAINE HYDROCHLORIDE 20 MG/ML
INJECTION INTRAVENOUS
Status: DISCONTINUED | OUTPATIENT
Start: 2023-11-03 | End: 2023-11-03

## 2023-11-03 RX ORDER — SODIUM CHLORIDE, SODIUM LACTATE, POTASSIUM CHLORIDE, CALCIUM CHLORIDE 600; 310; 30; 20 MG/100ML; MG/100ML; MG/100ML; MG/100ML
INJECTION, SOLUTION INTRAVENOUS CONTINUOUS
Status: DISCONTINUED | OUTPATIENT
Start: 2023-11-03 | End: 2023-11-03 | Stop reason: HOSPADM

## 2023-11-03 RX ORDER — EPINEPHRINE 0.1 MG/ML
INJECTION INTRAVENOUS
Status: COMPLETED | OUTPATIENT
Start: 2023-11-03 | End: 2023-11-03

## 2023-11-03 RX ORDER — PROPOFOL 10 MG/ML
VIAL (ML) INTRAVENOUS
Status: DISCONTINUED | OUTPATIENT
Start: 2023-11-03 | End: 2023-11-03

## 2023-11-03 RX ADMIN — PROPOFOL 120 MG: 10 INJECTION, EMULSION INTRAVENOUS at 10:11

## 2023-11-03 RX ADMIN — PROPOFOL 50 MG: 10 INJECTION, EMULSION INTRAVENOUS at 10:11

## 2023-11-03 RX ADMIN — LIDOCAINE HYDROCHLORIDE 50 MG: 20 INJECTION INTRAVENOUS at 10:11

## 2023-11-03 RX ADMIN — SODIUM CHLORIDE, POTASSIUM CHLORIDE, SODIUM LACTATE AND CALCIUM CHLORIDE: 600; 310; 30; 20 INJECTION, SOLUTION INTRAVENOUS at 10:11

## 2023-11-03 RX ADMIN — PROPOFOL 50 MG: 10 INJECTION, EMULSION INTRAVENOUS at 11:11

## 2023-11-03 NOTE — DISCHARGE SUMMARY
Discharge Summary    Admit Date: 11/3/2023  Discharge Date: 11/3/2023  Admitting Physician: ESME Conner MD      Admission HPI:   63 year old female with Hx of DM, HTN, anxiety, osteopenia, Vit B12 deficiency, hepatic steatosis, thyroid bx (2016), +HPV, presents today for colonoscopy following a positive cologuard test. Patient has had a colonoscopy before, several years ago at [place] which was normal. She admits to constipation and diarrhea with episodes of abdominal pain. She denies blood in her stool. She has no other complaints this morning.     Hospital Course:   Patient underwent colonoscopy and tolerated the procedure well. Performed polypectomy X6, see procedure note for full details.  Patient is currently in good condition and ready for discharge. Follow up with patient pending histology.     Procedures Performed: Colonoscopy    Final Diagnoses: There are no hospital problems to display for this patient.      Discharge Condition: Good    Disposition: Home    Follow-Up Plan: Pending histology    Discharge Instructions:   Activity: As before  Diet: Regular  Wound Care: N/A    Discharge Medications:  Current Discharge Medication List        CONTINUE these medications which have NOT CHANGED    Details   albuterol (PROVENTIL/VENTOLIN HFA) 90 mcg/actuation inhaler Inhale into the lungs.      aloe vera 25 mg Cap Take by mouth.      cinnamon bark (CINNAMON) 500 mg capsule Take 500 mg by mouth once daily.      empagliflozin (JARDIANCE) 10 mg tablet Take 1 tablet (10 mg total) by mouth Daily.  Qty: 30 tablet, Refills: 6      magnesium 30 mg Tab Take by mouth once.      omega-3 fatty acids/fish oil (FISH OIL-OMEGA-3 FATTY ACIDS) 300-1,000 mg capsule Take by mouth once daily.      pantoprazole (PROTONIX) 40 MG tablet Take 1 tablet (40 mg total) by mouth once daily.  Qty: 30 tablet, Refills: 11      red yeast rice 600 mg Tab Take by mouth.      valsartan (DIOVAN) 80 MG tablet Take 1 tablet (80 mg total) by  mouth once daily.  Qty: 30 tablet, Refills: 6    Comments: D/C valsartan 320 mg      zinc gluconate 50 mg tablet Take 50 mg by mouth once daily.      cetirizine (ZYRTEC) 10 MG tablet Take 1 tablet (10 mg total) by mouth once daily. for 14 days  Qty: 14 tablet, Refills: 0      cyclobenzaprine (FLEXERIL) 10 MG tablet Take 10 mg by mouth 2 (two) times daily as needed for Muscle spasms.      dicyclomine (BENTYL) 10 MG capsule Take 1 capsule (10 mg total) by mouth 3 (three) times daily as needed (abdominal cramping).  Qty: 30 capsule, Refills: 0    Associated Diagnoses: Abdominal cramping      fluticasone propionate (FLOVENT HFA) 220 mcg/actuation inhaler Take by mouth 2 (two) times a day.      polyethylene glycol (MOVIPREP) 100-7.5-2.691 gram solution Take as directed per instructions provided by GI Clinic  Qty: 1 kit, Refills: 0    Associated Diagnoses: Screening for colon cancer      rosuvastatin (CRESTOR) 10 MG tablet Take 1 tablet (10 mg total) by mouth once daily.  Qty: 90 tablet, Refills: 1           STOP taking these medications       hydrocortisone 1 % cream Comments:   Reason for Stopping:               Clay Villalba MD   [facility] General Surgery PGY2  11/03/2023 9:56 AM

## 2023-11-03 NOTE — H&P
[facility]   H&P Note  GI    Patient Name: Janet Anthony  YOB: 1960  Date of Admission: 11/3/2023  Date: 2023 9:49 AM    HD#0  POD#Day of Surgery    PRESENTING HISTORY     History of Present Illness:  63 year old female with Hx of DM, HTN, anxiety, osteopenia, Vit B12 deficiency, hepatic steatosis, thyroid bx (2016), +HPV, presents today for colonoscopy following a positive cologuard test. Patient has had a colonoscopy before, several years ago at [place] which was normal. She admits to constipation and diarrhea with episodes of abdominal pain. She denies blood in her stool. She has no other complaints this morning.     Review of Systems:  12 point ROS negative except as stated in HPI    PAST HISTORY:   Past medical history:  Past Medical History:   Diagnosis Date    Diabetes mellitus     Hypertension        Past surgical history:  Past Surgical History:   Procedure Laterality Date    ABDOMINOPLASTY  2009    CERVICAL FUSION  2015     SECTION      COLONOSCOPY  2016    REMOVAL OF BREAST IMPLANT  2016       Family history:  History reviewed. No pertinent family history.    Social history:  Social History     Socioeconomic History    Marital status:    Tobacco Use    Smoking status: Former     Current packs/day: 0.00     Types: Cigarettes     Quit date: 2022     Years since quittin.8    Smokeless tobacco: Never   Substance and Sexual Activity    Alcohol use: Never    Drug use: Not Currently     Types: Marijuana    Sexual activity: Not Currently     Social Determinants of Health     Financial Resource Strain: Low Risk  (3/21/2023)    Overall Financial Resource Strain (CARDIA)     Difficulty of Paying Living Expenses: Not hard at all   Food Insecurity: No Food Insecurity (3/21/2023)    Hunger Vital Sign     Worried About Running Out of Food in the Last Year: Never true     Ran Out of Food in the Last Year: Never true   Transportation Needs: No Transportation  Needs (3/21/2023)    PRAPARE - Transportation     Lack of Transportation (Medical): No     Lack of Transportation (Non-Medical): No   Physical Activity: Inactive (10/27/2023)    Exercise Vital Sign     Days of Exercise per Week: 0 days     Minutes of Exercise per Session: 0 min   Stress: No Stress Concern Present (3/21/2023)    [employer] [place] [employer] - Occupational Stress Questionnaire     Feeling of Stress : Not at all   Social Connections: Moderately Isolated (2023)    Social Connection and Isolation Panel [NHANES]     Frequency of Communication with Friends and Family: More than three times a week     Frequency of Social Gatherings with Friends and Family: More than three times a week     Attends Yazdanism Services: More than 4 times per year     Active Member of Clubs or Organizations: No     Attends Club or Organization Meetings: Never     Marital Status:    Housing Stability: Low Risk  (3/21/2023)    Housing Stability Vital Sign     Unable to Pay for Housing in the Last Year: No     Number of Places Lived in the Last Year: 1     Unstable Housing in the Last Year: No     Social History     Tobacco Use   Smoking Status Former    Current packs/day: 0.00    Types: Cigarettes    Quit date: 2022    Years since quittin.8   Smokeless Tobacco Never         MEDICATIONS & ALLERGIES:   Allergies:   Review of patient's allergies indicates:   Allergen Reactions    Lisinopril Swelling    Sulfa (sulfonamide antibiotics)     Meloxicam Palpitations       No current facility-administered medications on file prior to encounter.     Current Outpatient Medications on File Prior to Encounter   Medication Sig    albuterol (PROVENTIL/VENTOLIN HFA) 90 mcg/actuation inhaler Inhale into the lungs.    aloe vera 25 mg Cap Take by mouth.    cinnamon bark (CINNAMON) 500 mg capsule Take 500 mg by mouth once daily.    empagliflozin (JARDIANCE) 10 mg tablet Take 1 tablet (10 mg total) by mouth Daily.     "magnesium 30 mg Tab Take by mouth once.    omega-3 fatty acids/fish oil (FISH OIL-OMEGA-3 FATTY ACIDS) 300-1,000 mg capsule Take by mouth once daily.    pantoprazole (PROTONIX) 40 MG tablet Take 1 tablet (40 mg total) by mouth once daily.    red yeast rice 600 mg Tab Take by mouth.    valsartan (DIOVAN) 80 MG tablet Take 1 tablet (80 mg total) by mouth once daily.    zinc gluconate 50 mg tablet Take 50 mg by mouth once daily.    cetirizine (ZYRTEC) 10 MG tablet Take 1 tablet (10 mg total) by mouth once daily. for 14 days (Patient not taking: Reported on 10/27/2023)    fluticasone propionate (FLOVENT HFA) 220 mcg/actuation inhaler Take by mouth 2 (two) times a day.    rosuvastatin (CRESTOR) 10 MG tablet Take 1 tablet (10 mg total) by mouth once daily. (Patient not taking: Reported on 10/13/2023)       Scheduled Meds:    Continuous Infusions:    PRN Meds:    OBJECTIVE:     Vital Signs:     There is no height or weight on file to calculate BMI.     No intake/output data recorded.  No intake/output data recorded.    Physical Exam:  General:  Well developed, well nourished, no acute distress  HEENT:  Normocephalic, atraumatic  CVS:  RR  Resp:  Normal work of breathing on RA, equal rise and fall of the chest  GI: Abdomen soft, non-tender, non-distended, no masses, no guarding, no rebound  :  Deferred  Skin:  No rashes, ulcers, erythema  Neuro:  CNII-XII grossly intact, alert and oriented to person, place, and time    Laboratory:  No results for input(s): "WBC", "HGB", "HCT", "PLT", "PTT", "INR" in the last 72 hours.  No results for input(s): "NA", "K", "CL", "CO2", "BUN", "CREATININE", "GLU", "CALCIUM", "MG", "PHOS", "PROT", "ALBUMIN", "BILITOT", "AST", "ALKPHOS", "ALT" in the last 72 hours.  Troponin:  No results for input(s): "TROPONINI" in the last 72 hours.  CBC:  No results for input(s): "WBC", "RBC", "HGB", "HCT", "PLT", "MCV", "MCH", "MCHC" in the last 72 hours.  CMP:  No results for input(s): "GLU", "CALCIUM", " ""ALBUMIN", "PROT", "NA", "K", "CO2", "CL", "BUN", "CREATININE", "ALKPHOS", "ALT", "AST", "BILITOT" in the last 72 hours.  Lactic Acid:  No results for input(s): "LACTATE" in the last 72 hours.  Etoh:  No results for input(s): "ALCOHOLMEDIC" in the last 72 hours.  Drug Screen:  No results for input(s): "PCDSCOMETHA", "COCAINEMETAB", "OPIATESCREEN", "BARBITURATES", "AMPHETAMINES", "MARIJUANATHC", "PCDSOPHENCYN", "CREATRANDUR", "TOXINFO" in the last 72 hours.    ABG:  No results for input(s): "PH", "PCO2", "PO2", "HCO3", "BE", "POCSATURATED" in the last 72 hours.    Diagnostic Results:  No results found in the last 24 hours.  No orders to display       Microbiology:  Microbiology Results (last 7 days)       ** No results found for the last 168 hours. **             ASSESSMENT & PLAN:   63 year old female with Hx of DM< HTN, anxiety, osteopenia, Vit B12 deficiency, hepatic steatosis, thyroid bx (2016), +HPV, presents today for colonoscopy following a positive cologuard test.     Plan:  - Colonoscopy today  - Consented  -Discharge home after procedure if no complications  Clay Villalba MD  [employer] General Surgery PGY2  11/3/2023  9:49 AM   "

## 2023-11-03 NOTE — TRANSFER OF CARE
"Anesthesia Transfer of Care Note    Patient: Janet Anthony    Procedure(s) Performed: Procedure(s) (LRB):  COLONOSCOPY, WITH POLYPECTOMY USING SNARE (N/A)  COLONOSCOPY, WITH DIRECTED SUBMUCOSAL INJECTION    Patient location: GI    Anesthesia Type: general    Post pain: adequate analgesia    Post assessment: no apparent anesthetic complications    Post vital signs: stable    Level of consciousness: awake    Nausea/Vomiting: no nausea/vomiting    Complications: none    Transfer of care protocol was followed      Last vitals:   Visit Vitals  BP (!) 147/92 (BP Location: Right arm, Patient Position: Lying)   Pulse 88   Temp 36.7 °C (98 °F) (Oral)   Resp 17   Ht 5' 4" (1.626 m)   Wt 71.8 kg (158 lb 3.2 oz)   SpO2 95%   Breastfeeding No   BMI 27.15 kg/m²     "
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
## 2023-11-03 NOTE — [employer] PATIENT INSTRUCTIONS
Discharge Summary/Instructions after an Endoscopic Procedure  Patient Name: Janet Anthony  Patient MRN: [id number]  Patient YOB: 1960  Friday, November 3, 2023  LEONOR Conner MD  Dear patient,  As a result of recent federal legislation (The Federal Cures Act), you may   receive lab or pathology results from your procedure in your [facility]   account before your physician is able to contact you. Your physician or   their representative will relay the results to you with their   recommendations at their soonest availability.  Thank you,  RESTRICTIONS:  During your procedure today, you received medications for sedation.  These   medications may affect your judgment, balance and coordination.  Therefore,   for 24 hours, you have the following restrictions:   - DO NOT drive a car, operate machinery, make legal/financial decisions,   sign important papers or drink alcohol.    ACTIVITY:  Today: no heavy lifting, straining or running due to procedural   sedation/anesthesia.  The following day: return to full activity including work.  DIET:  Eat and drink normally unless instructed otherwise.     TREATMENT FOR COMMON SIDE EFFECTS:  - Mild abdominal pain, nausea, belching, bloating or excessive gas:  rest,   eat lightly and use a heating pad.  - Sore Throat: treat with throat lozenges and/or gargle with warm salt   water.  - Because air was used during the procedure, expelling large amounts of air   from your rectum or belching is normal.  - If a bowel prep was taken, you may not have a bowel movement for 1-3 days.    This is normal.  SYMPTOMS TO WATCH FOR AND REPORT TO YOUR PHYSICIAN:  1. Abdominal pain or bloating, other than gas cramps.  2. Chest pain.  3. Back pain.  4. Signs of infection such as: chills or fever occurring within 24 hours   after the procedure.  5. Rectal bleeding, which would show as bright red, maroon, or black stools.   (A tablespoon of blood from the rectum is not serious, especially  if   hemorrhoids are present.)  6. Vomiting.  7. Weakness or dizziness.  GO DIRECTLY TO THE NEAREST EMERGENCY ROOM IF YOU HAVE ANY OF THE FOLLOWING:      Difficulty breathing              Chills and/or fever over 101 F   Persistent vomiting and/or vomiting blood   Severe abdominal pain   Severe chest pain   Black, tarry stools   Bleeding- more than one tablespoon   Any other symptom or condition that you feel may need urgent attention  Your doctor recommends these additional instructions:  If any biopsies were taken, your doctors clinic will contact you in 1 to 2   weeks with any results.  - Resume previous diet.   - Continue present medications.   - Await pathology results.   - Repeat colonoscopy is recommended [Repeat reason].  The colonoscopy date   will be determined after pathology results from today's exam become   available for review.   - Discharge patient to home (via wheelchair).  For questions, problems or results please call your physician - LEONOR Conner MD at Work: [phone].  [facility] , EMERGENCY ROOM PHONE NUMBER: [phone]  IF A COMPLICATION OR EMERGENCY SITUATION ARISES AND YOU ARE UNABLE TO REACH   YOUR PHYSICIAN - GO DIRECTLY TO THE EMERGENCY ROOM.  LEONOR Conner MD  11/3/2023 11:40:55 AM  This report has been verified and signed electronically.  Dear patient,  As a result of recent federal legislation (The Federal Cures Act), you may   receive lab or pathology results from your procedure in your [facility]   account before your physician is able to contact you. Your physician or   their representative will relay the results to you with their   recommendations at their soonest availability.  Thank you,  [employer]ATION

## 2023-11-03 NOTE — PLAN OF CARE
Procedure complete returned to room awake alert- denies discomfort-tolerating water at this time daughter at bedside

## 2023-11-03 NOTE — ANESTHESIA POSTPROCEDURE EVALUATION
Anesthesia Post Evaluation    Patient: Janet Anthony    Procedure(s) Performed: Procedure(s) (LRB):  COLONOSCOPY, WITH POLYPECTOMY USING SNARE (N/A)  COLONOSCOPY, WITH DIRECTED SUBMUCOSAL INJECTION    Final Anesthesia Type: general      Patient location during evaluation: GI PACU  Patient participation: Yes- Able to Participate  Level of consciousness: awake and alert  Post-procedure vital signs: reviewed and stable  Pain management: adequate  Airway patency: patent    PONV status at discharge: No PONV  Anesthetic complications: no      Cardiovascular status: hemodynamically stable  Respiratory status: spontaneous ventilation  Hydration status: euvolemic  Follow-up not needed.          Vitals Value Taken Time   /92 11/03/23 1008   Temp 36.7 °C (98 °F) 11/03/23 1008   Pulse 88 11/03/23 1008   Resp 17 11/03/23 1008   SpO2 95 % 11/03/23 1008         No case tracking events are documented in the log.      Pain/Ludivina Score: No data recorded

## 2023-11-04 ENCOUNTER — PATIENT MESSAGE (OUTPATIENT)
Dept: ADMINISTRATIVE | Facility: OTHER | Age: 63
End: 2023-11-04
Payer: MEDICAID

## 2023-11-05 ENCOUNTER — PATIENT MESSAGE (OUTPATIENT)
Dept: ADMINISTRATIVE | Facility: OTHER | Age: 63
End: 2023-11-05
Payer: MEDICAID

## 2023-11-06 VITALS
BODY MASS INDEX: 27.01 KG/M2 | DIASTOLIC BLOOD PRESSURE: 75 MMHG | RESPIRATION RATE: 18 BRPM | TEMPERATURE: 98 F | HEART RATE: 68 BPM | OXYGEN SATURATION: 97 % | WEIGHT: 158.19 LBS | SYSTOLIC BLOOD PRESSURE: 125 MMHG | HEIGHT: 64 IN

## 2023-11-07 LAB
ESTROGEN SERPL-MCNC: NORMAL PG/ML
INSULIN SERPL-ACNC: NORMAL U[IU]/ML
LAB AP CLINICAL INFORMATION: NORMAL
LAB AP GROSS DESCRIPTION: NORMAL
LAB AP REPORT FOOTNOTES: NORMAL
T3RU NFR SERPL: NORMAL %

## 2023-11-08 ENCOUNTER — TELEPHONE (OUTPATIENT)
Dept: INTERNAL MEDICINE | Facility: CLINIC | Age: 63
End: 2023-11-08
Payer: MEDICAID

## 2023-11-08 DIAGNOSIS — E04.2 MULTIPLE THYROID NODULES: Primary | Chronic | ICD-10-CM

## 2023-11-10 ENCOUNTER — TELEPHONE (OUTPATIENT)
Dept: INTERVENTIONAL RADIOLOGY/VASCULAR | Facility: HOSPITAL | Age: 63
End: 2023-11-10

## 2023-11-15 ENCOUNTER — HOSPITAL ENCOUNTER (OUTPATIENT)
Dept: INTERVENTIONAL RADIOLOGY/VASCULAR | Facility: HOSPITAL | Age: 63
Discharge: HOME | End: 2023-11-15
Attending: NURSE PRACTITIONER
Payer: MEDICAID

## 2023-11-15 DIAGNOSIS — E04.1 THYROID NODULE: ICD-10-CM

## 2023-11-15 PROCEDURE — 88305 TISSUE EXAM BY PATHOLOGIST: CPT | Mod: TC | Performed by: NURSE PRACTITIONER

## 2023-11-15 PROCEDURE — 10005 FNA BX W/US GDN 1ST LES: CPT

## 2023-11-17 ENCOUNTER — TELEPHONE (OUTPATIENT)
Dept: INTERNAL MEDICINE | Facility: CLINIC | Age: 63
End: 2023-11-17
Payer: MEDICAID

## 2023-11-17 LAB
ESTROGEN SERPL-MCNC: NORMAL PG/ML
INSULIN SERPL-ACNC: NORMAL U[IU]/ML
LAB AP CLINICAL INFORMATION: NORMAL
LAB AP EBUS/EUS SPECIMEN: NORMAL
LAB AP GROSS DESCRIPTION: NORMAL
LAB AP NON-GYN INTERPRETATION SPECIMEN 1: NEGATIVE
LAB AP NON-GYN SPECIMEN 1 ADEQUACY: NORMAL
LAB AP PATHOLOGIST ADEQUACY INTERP: NORMAL

## 2023-11-17 NOTE — TELEPHONE ENCOUNTER
----- Message from Kim Mckenzie sent at 11/16/2023 11:28 AM CST -----  Regarding: FW: referral denied    ----- Message -----  From: Jovita Gonzalez FNP  Sent: 11/6/2023   8:17 AM CST  To: Kim Mckenzie  Subject: RE: referral denied                              Not my patient.  ----- Message -----  From: Kim Mckenzie  Sent: 11/3/2023  12:50 PM CST  To: RACHEAL Oconnell  Subject: referral denied                                  ENT recommends pt get a FNA then refer to ENT only if surgery is needed.  Thanks!

## 2023-11-17 NOTE — PROGRESS NOTES
Pathology shows polyps were adenoma, which is pre-cancerous but no cancer. Patient will need a follow up colonoscopy in 4 years.

## 2023-11-21 ENCOUNTER — TELEPHONE (OUTPATIENT)
Dept: INTERNAL MEDICINE | Facility: CLINIC | Age: 63
End: 2023-11-21
Payer: MEDICAID

## 2023-11-21 NOTE — TELEPHONE ENCOUNTER
Inform the pt that her biopsy results were benign. I will continue to monitor with thyroid US annually. Thanks

## 2023-11-21 NOTE — TELEPHONE ENCOUNTER
Pt notified via phone of results of biopsy and fact that she will continue to be monitored annually with thyroid U/S. Pt verbalized understanding. No questions or concerns at this time.

## 2023-11-21 NOTE — TELEPHONE ENCOUNTER
Pt called requesting results from biopsy on thyroid and US of thyroid. I see them in there but wanted to make sure you had reviewed them to let me know what I needed to tell her.

## 2023-11-27 ENCOUNTER — OFFICE VISIT (OUTPATIENT)
Dept: INTERNAL MEDICINE | Facility: CLINIC | Age: 63
End: 2023-11-27
Payer: MEDICAID

## 2023-11-27 VITALS
DIASTOLIC BLOOD PRESSURE: 76 MMHG | SYSTOLIC BLOOD PRESSURE: 132 MMHG | WEIGHT: 166.38 LBS | RESPIRATION RATE: 20 BRPM | TEMPERATURE: 98 F | HEART RATE: 76 BPM | BODY MASS INDEX: 28.41 KG/M2 | HEIGHT: 64 IN

## 2023-11-27 DIAGNOSIS — H93.11 TINNITUS OF RIGHT EAR: ICD-10-CM

## 2023-11-27 DIAGNOSIS — E11.9 TYPE 2 DIABETES MELLITUS WITHOUT COMPLICATION, WITHOUT LONG-TERM CURRENT USE OF INSULIN: Primary | Chronic | ICD-10-CM

## 2023-11-27 DIAGNOSIS — K44.9 ESOPHAGEAL HIATAL HERNIA: ICD-10-CM

## 2023-11-27 PROBLEM — Z00.00 WELLNESS EXAMINATION: Status: RESOLVED | Noted: 2023-04-27 | Resolved: 2023-11-27

## 2023-11-27 PROCEDURE — 1159F PR MEDICATION LIST DOCUMENTED IN MEDICAL RECORD: ICD-10-PCS | Mod: CPTII,,, | Performed by: NURSE PRACTITIONER

## 2023-11-27 PROCEDURE — 3061F NEG MICROALBUMINURIA REV: CPT | Mod: CPTII,,, | Performed by: NURSE PRACTITIONER

## 2023-11-27 PROCEDURE — 99214 OFFICE O/P EST MOD 30 MIN: CPT | Mod: S$PBB,,, | Performed by: NURSE PRACTITIONER

## 2023-11-27 PROCEDURE — 1160F RVW MEDS BY RX/DR IN RCRD: CPT | Mod: CPTII,,, | Performed by: NURSE PRACTITIONER

## 2023-11-27 PROCEDURE — 4010F ACE/ARB THERAPY RXD/TAKEN: CPT | Mod: CPTII,,, | Performed by: NURSE PRACTITIONER

## 2023-11-27 PROCEDURE — 4010F PR ACE/ARB THEARPY RXD/TAKEN: ICD-10-PCS | Mod: CPTII,,, | Performed by: NURSE PRACTITIONER

## 2023-11-27 PROCEDURE — 3066F PR DOCUMENTATION OF TREATMENT FOR NEPHROPATHY: ICD-10-PCS | Mod: CPTII,,, | Performed by: NURSE PRACTITIONER

## 2023-11-27 PROCEDURE — 99214 PR OFFICE/OUTPT VISIT, EST, LEVL IV, 30-39 MIN: ICD-10-PCS | Mod: S$PBB,,, | Performed by: NURSE PRACTITIONER

## 2023-11-27 PROCEDURE — 3078F PR MOST RECENT DIASTOLIC BLOOD PRESSURE < 80 MM HG: ICD-10-PCS | Mod: CPTII,,, | Performed by: NURSE PRACTITIONER

## 2023-11-27 PROCEDURE — 3008F BODY MASS INDEX DOCD: CPT | Mod: CPTII,,, | Performed by: NURSE PRACTITIONER

## 2023-11-27 PROCEDURE — 99215 OFFICE O/P EST HI 40 MIN: CPT | Mod: PBBFAC | Performed by: NURSE PRACTITIONER

## 2023-11-27 PROCEDURE — 3066F NEPHROPATHY DOC TX: CPT | Mod: CPTII,,, | Performed by: NURSE PRACTITIONER

## 2023-11-27 PROCEDURE — 1159F MED LIST DOCD IN RCRD: CPT | Mod: CPTII,,, | Performed by: NURSE PRACTITIONER

## 2023-11-27 PROCEDURE — 3061F PR NEG MICROALBUMINURIA RESULT DOCUMENTED/REVIEW: ICD-10-PCS | Mod: CPTII,,, | Performed by: NURSE PRACTITIONER

## 2023-11-27 PROCEDURE — 3044F PR MOST RECENT HEMOGLOBIN A1C LEVEL <7.0%: ICD-10-PCS | Mod: CPTII,,, | Performed by: NURSE PRACTITIONER

## 2023-11-27 PROCEDURE — 3078F DIAST BP <80 MM HG: CPT | Mod: CPTII,,, | Performed by: NURSE PRACTITIONER

## 2023-11-27 PROCEDURE — 1160F PR REVIEW ALL MEDS BY PRESCRIBER/CLIN PHARMACIST DOCUMENTED: ICD-10-PCS | Mod: CPTII,,, | Performed by: NURSE PRACTITIONER

## 2023-11-27 PROCEDURE — 3008F PR BODY MASS INDEX (BMI) DOCUMENTED: ICD-10-PCS | Mod: CPTII,,, | Performed by: NURSE PRACTITIONER

## 2023-11-27 PROCEDURE — 3044F HG A1C LEVEL LT 7.0%: CPT | Mod: CPTII,,, | Performed by: NURSE PRACTITIONER

## 2023-11-27 PROCEDURE — 3075F SYST BP GE 130 - 139MM HG: CPT | Mod: CPTII,,, | Performed by: NURSE PRACTITIONER

## 2023-11-27 PROCEDURE — 3075F PR MOST RECENT SYSTOLIC BLOOD PRESS GE 130-139MM HG: ICD-10-PCS | Mod: CPTII,,, | Performed by: NURSE PRACTITIONER

## 2023-11-27 RX ORDER — PANTOPRAZOLE SODIUM 40 MG/1
40 TABLET, DELAYED RELEASE ORAL DAILY
Qty: 30 TABLET | Refills: 11 | Status: SHIPPED | OUTPATIENT
Start: 2023-11-27 | End: 2024-11-26

## 2023-11-27 NOTE — PROGRESS NOTES
Juju Oviedo, RACHEAL   [facility]  [address] [address]      PATIENT NAME: Janet Anthony  : 1960  DATE: 23  MRN: [id number]      Reason for Visit / Chief Complaint: Tinnitus (Ringing in R ear x 2.5 months, refused vaccines)       History of Present Illness / Problem Focused Workflow     Janet Anthony is a 63 y.o. White female presents to the clinic for ringing in right ear.  PMH DM, HTN, HLD, anxiety, silicone breast implants, Vit B12 deficiency, osteopenia, DDD lumbar, hepatic steatosis, thyroid nodules - thyroid bx  & , + HPV on last pap, tobacco abuse (quit 2022), cervical fusion () and abdominoplasty. Followed by [facility], Deborah Mark, NP and [facility], GYN.      Today, pt reports constant ringing in right ear x 2.5 months but was previously in both ears. Is worse with lying down and feels hearing is decreasing.  Is requesting refills on protonix and diabetic strips. Pt will contact clinic with name of glucometer. Amendable to shingles vaccine today. Denies chest pain, shortness of breath, cough, fever, headache, weakness, abdominal pain, nausea, vomiting, diarrhea, constipation, dysuria, depression, anxiety, SI/HI.    Review of Systems     Review of Systems     See HPI for details    Constitutional: Denies Change in appetite. Denies Chills. Denies Fever. Denies Night sweats.  Eye: Denies Blurred vision. Denies Discharge. Denies Eye pain.  ENT: Admits Decreased hearing on right. Denies Sore throat. Denies Swollen glands. Admits Ringing in right ear.  Respiratory: Denies Cough. Denies Shortness of breath. Denies Shortness of breath with exertion. Denies Wheezing.  Cardiovascular: Denies Chest pain at rest. Denies Chest pain with exertion. Denies Irregular heartbeat. Denies Palpitations. Denies Edema.  Gastrointestinal: Denies Abdominal pain. Denies Diarrhea. Denies Nausea. Denies  Vomiting. Denies Hematemesis or Hematochezia.  Genitourinary: Denies Dysuria. Denies Urinary frequency. Denies Urinary urgency. Denies Blood in urine.  Endocrine: Denies Cold intolerance. Denies Excessive thirst. Denies Heat intolerance. Denies Weight loss. Denies Weight gain.  Musculoskeletal: Denies Painful joints. Denies Weakness.  Integumentary: Denies Rash. Denies Itching. Denies Dry skin.  Neurologic: Denies Dizziness. Denies Fainting. Denies Headache.  Psychiatric: Denies Depression. Denies Anxiety. Denies Suicidal/Homicidal ideations.    All Other ROS: Negative except as stated in HPI.     Medical / Surgical / Social / Family History       ----------------------------  Diabetes mellitus  Hypertension     Past Surgical History:   Procedure Laterality Date    ABDOMINOPLASTY      CERVICAL FUSION       SECTION      COLONOSCOPY  2016    COLONOSCOPY, WITH DIRECTED SUBMUCOSAL INJECTION  11/3/2023    Procedure: COLONOSCOPY, WITH DIRECTED SUBMUCOSAL INJECTION;  Surgeon: ESME Conner MD;  Location: [facility] ENDOSCOPY;  Service: Gastroenterology;;  Epinephrine, Spot ink    COLONOSCOPY, WITH POLYPECTOMY USING SNARE N/A 11/3/2023    Procedure: COLONOSCOPY, WITH POLYPECTOMY USING SNARE;  Surgeon: ESME Conner MD;  Location: [facility] ENDOSCOPY;  Service: Gastroenterology;  Laterality: N/A;    REMOVAL OF BREAST IMPLANT         Social History     Socioeconomic History    Marital status:    Tobacco Use    Smoking status: Former     Current packs/day: 0.00     Types: Cigarettes     Quit date: 2022     Years since quittin.9    Smokeless tobacco: Never   Substance and Sexual Activity    Alcohol use: Never    Drug use: Not Currently     Types: Marijuana    Sexual activity: Not Currently     Social Determinants of Health     Financial Resource Strain: Low Risk  (3/21/2023)    Overall Financial Resource Strain (CARDIA)     Difficulty of Paying Living Expenses: Not hard at all   Food Insecurity:  No Food Insecurity (3/21/2023)    Hunger Vital Sign     Worried About Running Out of Food in the Last Year: Never true     Ran Out of Food in the Last Year: Never true   Transportation Needs: No Transportation Needs (3/21/2023)    PRAPARE - Transportation     Lack of Transportation (Medical): No     Lack of Transportation (Non-Medical): No   Physical Activity: Inactive (10/27/2023)    Exercise Vital Sign     Days of Exercise per Week: 0 days     Minutes of Exercise per Session: 0 min   Stress: No Stress Concern Present (3/21/2023)    [place] [employer] - Occupational Stress Questionnaire     Feeling of Stress : Not at all   Social Connections: Moderately Isolated (9/27/2023)    Social Connection and Isolation Panel [NHANES]     Frequency of Communication with Friends and Family: More than three times a week     Frequency of Social Gatherings with Friends and Family: More than three times a week     Attends Anabaptist Services: More than 4 times per year     Active Member of Clubs or Organizations: No     Attends Club or Organization Meetings: Never     Marital Status:    Housing Stability: Low Risk  (3/21/2023)    Housing Stability Vital Sign     Unable to Pay for Housing in the Last Year: No     Number of Places Lived in the Last Year: 1     Unstable Housing in the Last Year: No        History reviewed. No pertinent family history.     Medications and Allergies     Medications  Current Outpatient Medications   Medication Instructions    albuterol (PROVENTIL/VENTOLIN HFA) 90 mcg/actuation inhaler Inhalation    aloe vera 25 mg Cap Oral    cinnamon bark (CINNAMON) 500 mg, Oral, Daily    cyclobenzaprine (FLEXERIL) 10 mg, Oral, 2 times daily PRN    empagliflozin (JARDIANCE) 10 mg, Oral, Daily    fluticasone propionate (FLOVENT HFA) 220 mcg/actuation inhaler Oral, 2 times daily    magnesium 30 mg Tab Oral, Once    omega-3 fatty acids/fish oil (FISH OIL-OMEGA-3 FATTY ACIDS) 300-1,000 mg  "capsule Oral, Daily    pantoprazole (PROTONIX) 40 mg, Oral, Daily    red yeast rice 600 mg Tab Oral    rosuvastatin (CRESTOR) 10 mg, Oral, Daily    valsartan (DIOVAN) 80 mg, Oral, Daily    zinc gluconate 50 mg, Oral, Daily         Allergies  Review of patient's allergies indicates:   Allergen Reactions    Lisinopril Swelling    Sulfa (sulfonamide antibiotics)     Meloxicam Palpitations       Physical Examination     /76 (BP Location: Right arm, Patient Position: Sitting, BP Method: Medium (Automatic))   Pulse 76   Temp 98.2 °F (36.8 °C) (Oral)   Resp 20   Ht 5' 4.02" (1.626 m)   Wt 75.5 kg (166 lb 6.4 oz)   BMI 28.55 kg/m²     Physical Exam  HENT:      Right Ear: Tympanic membrane, ear canal and external ear normal.      Left Ear: Tympanic membrane, ear canal and external ear normal.         General: Alert and oriented, No acute distress.  Head: Normocephalic, Atraumatic.  Eye: Pupils are equal, round and reactive to light, Extraocular movements are intact, Sclera non-icteric.  Ears/Nose/Throat: Normal, Mucosa moist,Clear.  Neck/Thyroid: Supple, Non-tender, No carotid bruit, No lymphadenopathy, No JVD, Full range of motion.  Respiratory: Clear to auscultation bilaterally; No wheezes, rales or rhonchi,Non-labored respirations, Symmetrical chest wall expansion.  Cardiovascular: Regular rate and rhythm, S1/S2 normal, No murmurs, rubs or gallops.  Gastrointestinal: Soft, Non-tender, Non-distended, Normal bowel sounds, No palpable organomegaly.  Musculoskeletal: Normal range of motion.  Integumentary: Warm, Dry, Intact, No suspicious lesions or rashes.  Extremities: No clubbing, cyanosis or edema  Neurologic: No focal deficits, Cranial Nerves II-XII are grossly intact, Motor strength normal upper and lower extremities, Sensory exam intact.  Psychiatric: Normal interaction, Coherent speech, Appropriate affect       Results     Lab Results   Component Value Date    WBC 9.72 08/29/2023    HGB 15.6 08/29/2023    " HCT 46.1 08/29/2023     08/29/2023    CHOL 248 (H) 08/22/2023    TRIG 114 08/22/2023    ALT 33 08/29/2023    AST 25 08/29/2023     08/29/2023    K 4.5 08/29/2023    CREATININE 0.81 08/29/2023    BUN 13.4 08/29/2023    CO2 26 08/29/2023    TSH 1.325 05/01/2023    INR 0.94 03/19/2023    HGBA1C 6.9 08/22/2023         Assessment and Plan (including Health Maintenance)     Plan:     1. Esophageal hiatal hernia  Pt requesting refills; esophagram in 10/2023.  Take protonix prn.  Avoid spicy, acidic, fried food and alcohol.   Eat 2-3 hours before bed.  Avoid tight fitting clothes and chew food thoroughly.   Reduce caffeine intake, avoid soda.   Take meds as prescribed.     - pantoprazole (PROTONIX) 40 MG tablet; Take 1 tablet (40 mg total) by mouth once daily.  Dispense: 30 tablet; Refill: 11    2. Tinnitus of right ear  Referral to audiology to eval/treat.  Keep appt when scheduled.   - Ambulatory referral/consult to Audiology; Future          Problem List Items Addressed This Visit          ENT    Tinnitus of right ear    Relevant Orders    Ambulatory referral/consult to Audiology       Endocrine    Type 2 diabetes mellitus without complication, without long-term current use of insulin - Primary (Chronic)    Relevant Orders    Comprehensive Metabolic Panel    Hemoglobin A1C    Lipid Panel       GI    Esophageal hiatal hernia    Relevant Medications    pantoprazole (PROTONIX) 40 MG tablet         Health Maintenance Due   Topic Date Due    Hepatitis C Screening  Never done    HIV Screening  Never done    Shingles Vaccine (1 of 2) Never done    RSV Vaccine (Age 60+ and Pregnant patients) (1 - 1-dose 60+ series) Never done    TETANUS VACCINE  09/12/2021    COVID-19 Vaccine (2 - 2023-24 season) 09/01/2023       Follow up in about 3 months (around 2/27/2024) for Follow up, Diabetes, Lab Results, HLD.        Signature:  RACHEAL Lerner  [facility]  [address]  [facility] [address] [facility] [address]

## 2023-12-06 ENCOUNTER — CLINICAL SUPPORT (OUTPATIENT)
Dept: AUDIOLOGY | Facility: HOSPITAL | Age: 63
End: 2023-12-06
Payer: MEDICAID

## 2023-12-06 DIAGNOSIS — H93.11 TINNITUS OF RIGHT EAR: ICD-10-CM

## 2023-12-06 DIAGNOSIS — H90.3 SENSORINEURAL HEARING LOSS, BILATERAL: Primary | ICD-10-CM

## 2023-12-06 PROCEDURE — 92567 TYMPANOMETRY: CPT | Performed by: AUDIOLOGIST

## 2023-12-06 PROCEDURE — 92557 COMPREHENSIVE HEARING TEST: CPT | Performed by: AUDIOLOGIST

## 2023-12-06 NOTE — PROGRESS NOTES
Hearing Evaluation        Patient History: Ms. Anthony reports decreased hearing and constant bilateral tinnitus, onset about 3 months ago. Vertigo and middle ear issues are denied. All additional history is unremarkable.        Test Results:                    Pure Tone Testing:      Right ear:       Mild sensorineural hearing loss from 2k-8kHz. Speech reception threshold is in agreement with puretone findings.  Discrimination score of 100% is considered excellent.        Left ear:          Mild sensorineural hearing loss from 2k-8kHz. Speech reception threshold is in agreement with puretone findings.  Discrimination score of 100% is considered excellent.                  Tympanometry:                                           Right ear:       Type 'A' tymp, normal middle ear pressure/function     Left ear:          Type 'A' tymp, normal middle ear pressure/function                                    Interpretations:      Behavioral test findings indicate a mild high frequency sensorineural hearing loss, bilaterally. Speech reception thresholds obtained at 25dB, AU, and are in agreement with puretone findings. Speech discrimination scores of 100%, AU, are considered excellent.  Immittance measures indicate normal middle ear pressure/function, bilaterally.            Recommendations:   RTC PRN with audiology.

## 2023-12-12 ENCOUNTER — TELEPHONE (OUTPATIENT)
Dept: INTERNAL MEDICINE | Facility: CLINIC | Age: 63
End: 2023-12-12
Payer: MEDICAID

## 2023-12-12 ENCOUNTER — OFFICE VISIT (OUTPATIENT)
Dept: URGENT CARE | Facility: CLINIC | Age: 63
End: 2023-12-12
Payer: MEDICAID

## 2023-12-12 VITALS
RESPIRATION RATE: 18 BRPM | OXYGEN SATURATION: 97 % | SYSTOLIC BLOOD PRESSURE: 145 MMHG | BODY MASS INDEX: 28.04 KG/M2 | TEMPERATURE: 98 F | DIASTOLIC BLOOD PRESSURE: 89 MMHG | HEIGHT: 64 IN | WEIGHT: 164.25 LBS | HEART RATE: 78 BPM

## 2023-12-12 DIAGNOSIS — R30.0 DYSURIA: ICD-10-CM

## 2023-12-12 DIAGNOSIS — E11.9 TYPE 2 DIABETES MELLITUS WITHOUT COMPLICATION, WITHOUT LONG-TERM CURRENT USE OF INSULIN: Primary | Chronic | ICD-10-CM

## 2023-12-12 LAB
APPEARANCE UR: CLEAR
BACTERIA #/AREA URNS AUTO: ABNORMAL /HPF
BILIRUB UR QL STRIP.AUTO: NEGATIVE
BILIRUB UR QL STRIP: NEGATIVE
COLOR UR AUTO: ABNORMAL
GLUCOSE SERPL-MCNC: 226 MG/DL (ref 70–110)
GLUCOSE UR QL STRIP.AUTO: ABNORMAL
GLUCOSE UR QL STRIP: POSITIVE
HYALINE CASTS #/AREA URNS LPF: ABNORMAL /LPF
KETONES UR QL STRIP.AUTO: NEGATIVE
KETONES UR QL STRIP: NEGATIVE
LEUKOCYTE ESTERASE UR QL STRIP.AUTO: NEGATIVE
LEUKOCYTE ESTERASE UR QL STRIP: NEGATIVE
MUCOUS THREADS URNS QL MICRO: ABNORMAL /LPF
NITRITE UR QL STRIP.AUTO: NEGATIVE
PH UR STRIP.AUTO: 5 [PH]
PH, POC UA: 5.5
POC BLOOD, URINE: NEGATIVE
POC NITRATES, URINE: NEGATIVE
PROT UR QL STRIP.AUTO: ABNORMAL
PROT UR QL STRIP: POSITIVE
RBC #/AREA URNS AUTO: ABNORMAL /HPF
RBC UR QL AUTO: NEGATIVE
SP GR UR STRIP.AUTO: 1.02 (ref 1–1.03)
SP GR UR STRIP: 1.02 (ref 1–1.03)
SQUAMOUS #/AREA URNS LPF: ABNORMAL /HPF
UROBILINOGEN UR STRIP-ACNC: ABNORMAL (ref 0.1–1.1)
UROBILINOGEN UR STRIP-ACNC: NORMAL
WBC #/AREA URNS AUTO: ABNORMAL /HPF

## 2023-12-12 PROCEDURE — 81001 URINALYSIS AUTO W/SCOPE: CPT | Performed by: FAMILY MEDICINE

## 2023-12-12 PROCEDURE — 99213 PR OFFICE/OUTPT VISIT, EST, LEVL III, 20-29 MIN: ICD-10-PCS | Mod: S$PBB,,, | Performed by: FAMILY MEDICINE

## 2023-12-12 PROCEDURE — 82962 GLUCOSE BLOOD TEST: CPT | Mod: PBBFAC | Performed by: FAMILY MEDICINE

## 2023-12-12 PROCEDURE — 81003 URINALYSIS AUTO W/O SCOPE: CPT | Mod: PBBFAC,59 | Performed by: FAMILY MEDICINE

## 2023-12-12 PROCEDURE — 99215 OFFICE O/P EST HI 40 MIN: CPT | Mod: PBBFAC | Performed by: FAMILY MEDICINE

## 2023-12-12 PROCEDURE — 99213 OFFICE O/P EST LOW 20 MIN: CPT | Mod: S$PBB,,, | Performed by: FAMILY MEDICINE

## 2023-12-12 RX ORDER — PREGABALIN 75 MG/1
75 CAPSULE ORAL 2 TIMES DAILY
COMMUNITY
Start: 2023-11-30

## 2023-12-12 RX ORDER — METHOCARBAMOL 750 MG/1
TABLET, FILM COATED ORAL
COMMUNITY
Start: 2023-11-30 | End: 2024-03-21

## 2023-12-12 NOTE — TELEPHONE ENCOUNTER
Patient called requesting refill on Pen Needles and Test strips . Pt states previous provider prescribed both , but is now established with you . Patient states she uses the Metrix meter.

## 2023-12-12 NOTE — PROGRESS NOTES
"Subjective:       Patient ID: Janet Anthony is a 63 y.o. female.    Vitals:  height is 5' 4" (1.626 m) and weight is 74.5 kg (164 lb 3.9 oz). Her temperature is 98.3 °F (36.8 °C). Her blood pressure is 145/89 (abnormal) and her pulse is 78. Her respiration is 18 and oxygen saturation is 97%.     Chief Complaint: Dysuria (X 1wk)    Patient with a history of chronic back pain.  States she may have noticed blood in her urine several days ago.  Denies vaginal bleeding.  Occasional suprapubic pressure, not necessarily dysuria.  Denies flank pain, no significant abdominal pain, no fever, no diarrhea.  Chart reviewed            Objective:   Physical Exam   Constitutional:  Non-toxic appearance. She does not appear ill. No distress.   Neck: Neck supple.   Abdominal: Normal appearance. She exhibits no distension. flat abdomen There is no abdominal tenderness. There is no rebound, no guarding, no left CVA tenderness and no right CVA tenderness.   Musculoskeletal:      Lumbar back: She exhibits tenderness and spasm (Left paralumbar, reproduces symptoms).   Neurological: no focal deficit. She is alert.   Skin: Skin is warm, dry and not diaphoretic.   Psychiatric: Her behavior is normal. Mood normal.   Nursing note and vitals reviewed.    Results for orders placed or performed in visit on 12/12/23   POCT Urinalysis, Dipstick, Automated, W/O Scope   Result Value Ref Range    POC Blood, Urine Negative Negative, Positive Slide, Positive Tube    POC Bilirubin, Urine Negative Negative, Positive Slide, Positive Tube    POC Urobilinogen, Urine 0.2e.u./dl 0.1 - 1.1    POC Ketones, Urine Negative Negative, Positive Slide, Positive Tube    POC Protein, Urine Positive (A) Negative, Positive Slide, Positive Tube    POC Nitrates, Urine Negative Negative, Positive Slide, Positive Tube    POC Glucose, Urine Positive (A) Negative, Positive Slide, Positive Tube    pH, UA 5.5     POC Specific Gravity, Urine 1.020 1.003 - 1.029    POC " Leukocytes, Urine Negative Negative, Positive Slide, Positive Tube   POCT Glucose, Hand-Held Device   Result Value Ref Range    POC Glucose 226 (A) 70 - 110 MG/DL       Assessment:     1. Dysuria    2. Type 2 diabetes mellitus without complication, without long-term current use of insulin          Plan:   Will send urine off for micro with reflex culture.  Had a lengthy discussion about elevated blood glucose and an episode of what sounds like gross hematuria.  She has not taken her diabetes medications yet today.  She will take those when she gets home.  I encouraged her to contact her PCP to arrange a timely follow-up.  Discussed ER precautions.    Dysuria  -     POCT Urinalysis, Dipstick, Automated, W/O Scope    Type 2 diabetes mellitus without complication, without long-term current use of insulin  -     POCT Glucose, Hand-Held Device        Please note: This chart was completed via voice to text dictation. It may contain typographical/word recognition errors. If there are any questions, please contact the provider for final clarification.

## 2023-12-13 RX ORDER — LANCETS
EACH MISCELLANEOUS
Qty: 50 EACH | Refills: 6 | Status: SHIPPED | OUTPATIENT
Start: 2023-12-13

## 2023-12-17 ENCOUNTER — TELEPHONE (OUTPATIENT)
Dept: URGENT CARE | Facility: CLINIC | Age: 63
End: 2023-12-17
Payer: MEDICAID

## 2023-12-17 NOTE — TELEPHONE ENCOUNTER
----- Message from Nataliia Talbert sent at 12/16/2023 12:55 PM CST -----  Patient had a urine culture she states Dr Gong was suppose to let her know something yesterday. She says she saw it on portal but doesn't understand it. Wants to know if she has UTI and does she need Abx ? Also she is feeling worse  [phone]

## 2023-12-17 NOTE — TELEPHONE ENCOUNTER
Chart reviewed   I informed patient that we will notify her if urine studies indicated urinary tract infection which needed treatment.  Urinalysis does not show infection.  Please notify her to go to the emergency room if she is feeling worse, she may need lab work.  Her capillary glucose was elevated at her last visit.

## 2023-12-19 ENCOUNTER — TELEPHONE (OUTPATIENT)
Dept: INTERNAL MEDICINE | Facility: CLINIC | Age: 63
End: 2023-12-19
Payer: MEDICAID

## 2023-12-19 NOTE — TELEPHONE ENCOUNTER
----- Message from Rosalee Al sent at 12/19/2023 12:44 PM CST -----  Regarding: Medical advice(call  back request)  Type:  Needs Medical Advice    Who Called: Janet     Symptoms (please be specific): possible UTI stated she looked at her results on my chart. Back pain   and leg cramps.  Pain in rectum.    Would the patient rather a call back or a response via [facility]? Call back     Best Call Back Number: [phone]    Additional Information: Janet is requesting a call back to discuss her results on my chart and her possibly UTI.  She stated she is really concerned.

## 2023-12-20 NOTE — TELEPHONE ENCOUNTER
Pt notified of that UTI was negative as informed by [facility]. Pt also made aware she does not qualify for evist and would need an appt or go to [facility] or ER for re-evaluation. Pt states her symptoms are improved and will monitor and if they return or worsen advised to go to ER/[facility]. Pt verbalized understanding and will call with any questions or concerns.

## 2023-12-20 NOTE — TELEPHONE ENCOUNTER
You can inform the pt that she does not have an UTI as informed per [facility]. She has too many complaints/does not meet criteria for an e-visit. She will either need to schedule a visit or report [facility] or ED for re-evaluation. Thanks

## 2024-01-10 ENCOUNTER — OFFICE VISIT (OUTPATIENT)
Dept: URGENT CARE | Facility: CLINIC | Age: 64
End: 2024-01-10
Payer: MEDICAID

## 2024-01-10 VITALS
WEIGHT: 165 LBS | OXYGEN SATURATION: 97 % | BODY MASS INDEX: 28.17 KG/M2 | TEMPERATURE: 98 F | RESPIRATION RATE: 20 BRPM | SYSTOLIC BLOOD PRESSURE: 165 MMHG | HEIGHT: 64 IN | DIASTOLIC BLOOD PRESSURE: 95 MMHG | HEART RATE: 65 BPM

## 2024-01-10 DIAGNOSIS — J20.9 ACUTE BRONCHITIS, UNSPECIFIED ORGANISM: ICD-10-CM

## 2024-01-10 DIAGNOSIS — Z20.818 EXPOSURE TO STREPTOCOCCAL PHARYNGITIS: Primary | ICD-10-CM

## 2024-01-10 DIAGNOSIS — J02.9 SORE THROAT: ICD-10-CM

## 2024-01-10 DIAGNOSIS — R05.9 COUGH, UNSPECIFIED TYPE: ICD-10-CM

## 2024-01-10 LAB
CTP QC/QA: YES
MOLECULAR STREP A: NEGATIVE
POC MOLECULAR INFLUENZA A AGN: NEGATIVE
POC MOLECULAR INFLUENZA B AGN: NEGATIVE
SARS-COV-2 RDRP RESP QL NAA+PROBE: NEGATIVE

## 2024-01-10 PROCEDURE — 87651 STREP A DNA AMP PROBE: CPT | Mod: PBBFAC

## 2024-01-10 PROCEDURE — 99215 OFFICE O/P EST HI 40 MIN: CPT | Mod: PBBFAC

## 2024-01-10 PROCEDURE — 99214 OFFICE O/P EST MOD 30 MIN: CPT | Mod: S$PBB,,,

## 2024-01-10 PROCEDURE — 87635 SARS-COV-2 COVID-19 AMP PRB: CPT | Mod: PBBFAC

## 2024-01-10 PROCEDURE — 87502 INFLUENZA DNA AMP PROBE: CPT | Mod: PBBFAC

## 2024-01-10 RX ORDER — AZITHROMYCIN 250 MG/1
TABLET, FILM COATED ORAL
Qty: 6 TABLET | Refills: 0 | Status: SHIPPED | OUTPATIENT
Start: 2024-01-10 | End: 2024-01-15

## 2024-01-10 RX ORDER — LANCETS 33 GAUGE
EACH MISCELLANEOUS
COMMUNITY
Start: 2023-12-15

## 2024-01-10 RX ORDER — DM/P-EPHED/ACETAMINOPH/DOXYLAM
LIQUID (ML) ORAL
COMMUNITY

## 2024-01-26 ENCOUNTER — OFFICE VISIT (OUTPATIENT)
Dept: BEHAVIORAL HEALTH | Facility: CLINIC | Age: 64
End: 2024-01-26
Payer: MEDICAID

## 2024-01-26 DIAGNOSIS — F41.8 MIXED ANXIETY AND DEPRESSIVE DISORDER: Primary | Chronic | ICD-10-CM

## 2024-01-26 PROCEDURE — 4010F ACE/ARB THERAPY RXD/TAKEN: CPT | Mod: CPTII,NDTC,, | Performed by: NURSE PRACTITIONER

## 2024-01-26 PROCEDURE — 1159F MED LIST DOCD IN RCRD: CPT | Mod: CPTII,NDTC,, | Performed by: NURSE PRACTITIONER

## 2024-01-26 PROCEDURE — 1160F RVW MEDS BY RX/DR IN RCRD: CPT | Mod: CPTII,NDTC,, | Performed by: NURSE PRACTITIONER

## 2024-01-26 PROCEDURE — 99212 OFFICE O/P EST SF 10 MIN: CPT | Mod: S$PBB,NDTC,, | Performed by: NURSE PRACTITIONER
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
## 2024-02-09 NOTE — TELEPHONE ENCOUNTER
Spoke with pt in regards to her urine results. She stated she is still experiencing some abdomen/lower back pain with some dizziness. She was seen at  on 12/12/23. I advised pt depending on there severity of her symptoms to go to the ER for a higher level of care and treatment. She was requesting to do a e-visit.    LOV:11/27/23  NOV:2/27/24   yes

## 2024-03-07 ENCOUNTER — LAB VISIT (OUTPATIENT)
Dept: LAB | Facility: HOSPITAL | Age: 64
End: 2024-03-07
Attending: NURSE PRACTITIONER
Payer: MEDICAID

## 2024-03-07 DIAGNOSIS — E11.9 TYPE 2 DIABETES MELLITUS WITHOUT COMPLICATION, WITHOUT LONG-TERM CURRENT USE OF INSULIN: Chronic | ICD-10-CM

## 2024-03-07 LAB
ALBUMIN SERPL-MCNC: 4.3 G/DL (ref 3.4–4.8)
ALBUMIN/GLOB SERPL: 1.1 RATIO (ref 1.1–2)
ALP SERPL-CCNC: 94 UNIT/L (ref 40–150)
ALT SERPL-CCNC: 64 UNIT/L (ref 0–55)
AST SERPL-CCNC: 38 UNIT/L (ref 5–34)
BILIRUB SERPL-MCNC: 0.5 MG/DL
BUN SERPL-MCNC: 12.9 MG/DL (ref 9.8–20.1)
CALCIUM SERPL-MCNC: 9.8 MG/DL (ref 8.4–10.2)
CHLORIDE SERPL-SCNC: 104 MMOL/L (ref 98–107)
CHOLEST SERPL-MCNC: 286 MG/DL
CHOLEST/HDLC SERPL: 6 {RATIO} (ref 0–5)
CO2 SERPL-SCNC: 26 MMOL/L (ref 23–31)
CREAT SERPL-MCNC: 0.79 MG/DL (ref 0.55–1.02)
EST. AVERAGE GLUCOSE BLD GHB EST-MCNC: 154.2 MG/DL
GFR SERPLBLD CREATININE-BSD FMLA CKD-EPI: >60 MLS/MIN/1.73/M2
GLOBULIN SER-MCNC: 3.8 GM/DL (ref 2.4–3.5)
GLUCOSE SERPL-MCNC: 154 MG/DL (ref 82–115)
HBA1C MFR BLD: 7 %
HDLC SERPL-MCNC: 46 MG/DL (ref 35–60)
LDLC SERPL CALC-MCNC: 199 MG/DL (ref 50–140)
POTASSIUM SERPL-SCNC: 4.2 MMOL/L (ref 3.5–5.1)
PROT SERPL-MCNC: 8.1 GM/DL (ref 5.8–7.6)
SODIUM SERPL-SCNC: 139 MMOL/L (ref 136–145)
TRIGL SERPL-MCNC: 207 MG/DL (ref 37–140)
VLDLC SERPL CALC-MCNC: 41 MG/DL

## 2024-03-07 PROCEDURE — 80053 COMPREHEN METABOLIC PANEL: CPT

## 2024-03-07 PROCEDURE — 36415 COLL VENOUS BLD VENIPUNCTURE: CPT

## 2024-03-07 PROCEDURE — 80061 LIPID PANEL: CPT

## 2024-03-07 PROCEDURE — 83036 HEMOGLOBIN GLYCOSYLATED A1C: CPT

## 2024-03-21 ENCOUNTER — HOSPITAL ENCOUNTER (EMERGENCY)
Facility: HOSPITAL | Age: 64
Discharge: HOME | End: 2024-03-21
Attending: EMERGENCY MEDICINE
Payer: MEDICAID

## 2024-03-21 VITALS
HEART RATE: 98 BPM | OXYGEN SATURATION: 99 % | RESPIRATION RATE: 19 BRPM | TEMPERATURE: 98 F | DIASTOLIC BLOOD PRESSURE: 80 MMHG | SYSTOLIC BLOOD PRESSURE: 160 MMHG

## 2024-03-21 DIAGNOSIS — R42 DIZZINESS: ICD-10-CM

## 2024-03-21 DIAGNOSIS — M62.838 MUSCLE SPASMS OF NECK: ICD-10-CM

## 2024-03-21 DIAGNOSIS — I10 ELEVATED BLOOD PRESSURE READING WITH DIAGNOSIS OF HYPERTENSION: Primary | ICD-10-CM

## 2024-03-21 LAB
ALBUMIN SERPL-MCNC: 4.4 G/DL (ref 3.4–4.8)
ALBUMIN/GLOB SERPL: 1.3 RATIO (ref 1.1–2)
ALP SERPL-CCNC: 86 UNIT/L (ref 40–150)
ALT SERPL-CCNC: 54 UNIT/L (ref 0–55)
AST SERPL-CCNC: 33 UNIT/L (ref 5–34)
BASOPHILS # BLD AUTO: 0.04 X10(3)/MCL
BASOPHILS NFR BLD AUTO: 0.7 %
BILIRUB SERPL-MCNC: 0.3 MG/DL
BUN SERPL-MCNC: 12.2 MG/DL (ref 9.8–20.1)
CALCIUM SERPL-MCNC: 9.9 MG/DL (ref 8.4–10.2)
CHLORIDE SERPL-SCNC: 108 MMOL/L (ref 98–107)
CO2 SERPL-SCNC: 23 MMOL/L (ref 23–31)
CREAT SERPL-MCNC: 0.8 MG/DL (ref 0.55–1.02)
EOSINOPHIL # BLD AUTO: 0.08 X10(3)/MCL (ref 0–0.9)
EOSINOPHIL NFR BLD AUTO: 1.5 %
ERYTHROCYTE [DISTWIDTH] IN BLOOD BY AUTOMATED COUNT: 11.9 % (ref 11.5–17)
GFR SERPLBLD CREATININE-BSD FMLA CKD-EPI: >60 MLS/MIN/1.73/M2
GLOBULIN SER-MCNC: 3.4 GM/DL (ref 2.4–3.5)
GLUCOSE SERPL-MCNC: 121 MG/DL (ref 82–115)
HCT VFR BLD AUTO: 43.1 % (ref 37–47)
HGB BLD-MCNC: 14.9 G/DL (ref 12–16)
IMM GRANULOCYTES # BLD AUTO: 0.01 X10(3)/MCL (ref 0–0.04)
IMM GRANULOCYTES NFR BLD AUTO: 0.2 %
LYMPHOCYTES # BLD AUTO: 2.06 X10(3)/MCL (ref 0.6–4.6)
LYMPHOCYTES NFR BLD AUTO: 37.4 %
MCH RBC QN AUTO: 30.8 PG (ref 27–31)
MCHC RBC AUTO-ENTMCNC: 34.6 G/DL (ref 33–36)
MCV RBC AUTO: 89.2 FL (ref 80–94)
MONOCYTES # BLD AUTO: 0.47 X10(3)/MCL (ref 0.1–1.3)
MONOCYTES NFR BLD AUTO: 8.5 %
NEUTROPHILS # BLD AUTO: 2.85 X10(3)/MCL (ref 2.1–9.2)
NEUTROPHILS NFR BLD AUTO: 51.7 %
NRBC BLD AUTO-RTO: 0 %
PLATELET # BLD AUTO: 257 X10(3)/MCL (ref 130–400)
PMV BLD AUTO: 8.8 FL (ref 7.4–10.4)
POTASSIUM SERPL-SCNC: 4.1 MMOL/L (ref 3.5–5.1)
PROT SERPL-MCNC: 7.8 GM/DL (ref 5.8–7.6)
RBC # BLD AUTO: 4.83 X10(6)/MCL (ref 4.2–5.4)
SODIUM SERPL-SCNC: 140 MMOL/L (ref 136–145)
TROPONIN I SERPL-MCNC: <0.01 NG/ML (ref 0–0.04)
WBC # SPEC AUTO: 5.51 X10(3)/MCL (ref 4.5–11.5)

## 2024-03-21 PROCEDURE — 25000003 PHARM REV CODE 250: Performed by: NURSE PRACTITIONER

## 2024-03-21 PROCEDURE — 80053 COMPREHEN METABOLIC PANEL: CPT | Performed by: NURSE PRACTITIONER

## 2024-03-21 PROCEDURE — 99285 EMERGENCY DEPT VISIT HI MDM: CPT | Mod: 25

## 2024-03-21 PROCEDURE — 84484 ASSAY OF TROPONIN QUANT: CPT | Performed by: NURSE PRACTITIONER

## 2024-03-21 PROCEDURE — 85025 COMPLETE CBC W/AUTO DIFF WBC: CPT | Performed by: NURSE PRACTITIONER

## 2024-03-21 PROCEDURE — 93005 ELECTROCARDIOGRAM TRACING: CPT

## 2024-03-21 RX ORDER — BACLOFEN 10 MG/1
10 TABLET ORAL
Status: COMPLETED | OUTPATIENT
Start: 2024-03-21 | End: 2024-03-21

## 2024-03-21 RX ORDER — BACLOFEN 10 MG/1
10 TABLET ORAL 3 TIMES DAILY PRN
Qty: 21 TABLET | Refills: 0 | Status: SHIPPED | OUTPATIENT
Start: 2024-03-21

## 2024-03-21 RX ADMIN — BACLOFEN 10 MG: 10 TABLET ORAL at 08:03

## 2024-03-22 LAB
OHS QRS DURATION: 82 MS
OHS QTC CALCULATION: 421 MS

## 2024-03-22 NOTE — ED PROVIDER NOTES
"Encounter Date: 3/21/2024       History     Chief Complaint   Patient presents with    HEADACHES      PT IN /AASI W CO RECURRENT HA AND ELEVATED BP X 3 MONTHS.  REPORTS "PRISMS"  IN VISUAL FIELD  AND BODY "HUMMING"  BUT NOT AT PRESENT.    EN ROUTE,  18 G TO LT FOREARM /AASI.      Pt is a 63 y.o. female who presents to the [facility] ED complaining of episodes of elevated blood pressure with "visual changes" and neck pain. Symptoms have been intermittent for the last 3-4 months per pt. Hx of HTN, DM, and anxiety. Admits to being scheduled with her PCP for Monday of this coming week but was concerned to wait. Denies chest pain, SOB, weakness, fever, or abdominal pain. Says that when she feels her blood pressure rise, she often feels dizzy.      Review of patient's allergies indicates:   Allergen Reactions    Corticosteroids (glucocorticoids)      Elevates blood pressure and blood sugar.    Lisinopril Swelling    Sulfa (sulfonamide antibiotics)     Meloxicam Palpitations     Past Medical History:   Diagnosis Date    Diabetes mellitus     Hypertension      Past Surgical History:   Procedure Laterality Date    ABDOMINOPLASTY      BIOPSY OF THYROID      CERVICAL FUSION  2015     SECTION      COLONOSCOPY  2016    COLONOSCOPY, WITH DIRECTED SUBMUCOSAL INJECTION  2023    Procedure: COLONOSCOPY, WITH DIRECTED SUBMUCOSAL INJECTION;  Surgeon: ESME Conner MD;  Location: [facility] ENDOSCOPY;  Service: Gastroenterology;;  Epinephrine, Spot ink    COLONOSCOPY, WITH POLYPECTOMY USING SNARE N/A 2023    Procedure: COLONOSCOPY, WITH POLYPECTOMY USING SNARE;  Surgeon: ESME Conner MD;  Location: [facility] ENDOSCOPY;  Service: Gastroenterology;  Laterality: N/A;    REMOVAL OF BREAST IMPLANT       Family History   Problem Relation Age of Onset    Lung cancer Mother     Heart attack Father      Social History     Tobacco Use    Smoking status: Former     Current packs/day: 0.00     Types: Cigarettes     Quit " date: 2022     Years since quittin.2    Smokeless tobacco: Never   Substance Use Topics    Alcohol use: Never    Drug use: Never     Review of Systems   Constitutional:  Negative for chills, diaphoresis, fatigue and fever.   HENT:  Negative for facial swelling, rhinorrhea, sinus pressure, sinus pain, sore throat and trouble swallowing.    Respiratory:  Negative for cough, chest tightness, shortness of breath and wheezing.    Cardiovascular:  Negative for chest pain, palpitations and leg swelling.   Gastrointestinal:  Negative for abdominal pain, diarrhea, nausea and vomiting.   Genitourinary:  Negative for dysuria, flank pain, frequency, hematuria and urgency.   Musculoskeletal:  Positive for myalgias. Negative for arthralgias, back pain and joint swelling.   Skin:  Negative for color change and rash.   Neurological:  Positive for dizziness and headaches. Negative for syncope, weakness and light-headedness.   Hematological:  Does not bruise/bleed easily.   All other systems reviewed and are negative.      Physical Exam     Initial Vitals [24 1835]   BP Pulse Resp Temp SpO2   (!) 167/81 72 16 97.9 °F (36.6 °C) 100 %      MAP       --         Physical Exam    Nursing note and vitals reviewed.  Constitutional: She appears well-developed and well-nourished.   HENT:   Head: Normocephalic and atraumatic.       Nose: Nose normal.   Mouth/Throat: Oropharynx is clear and moist.   Eyes: Conjunctivae and EOM are normal. Pupils are equal, round, and reactive to light.   Neck: Neck supple.   Normal range of motion.  Cardiovascular:  Normal rate, regular rhythm, normal heart sounds and intact distal pulses.           Pulmonary/Chest: Effort normal and breath sounds normal. No respiratory distress. She has no wheezes. She has no rhonchi. She has no rales. She exhibits no tenderness.   Abdominal: Abdomen is soft and flat. Bowel sounds are normal. She exhibits no distension. There is no abdominal tenderness. There is  no rebound, no guarding, no tenderness at McBurney's point and negative Anderson's sign. negative psoas sign  Musculoskeletal:         General: Normal range of motion.      Cervical back: Normal range of motion and neck supple.     Neurological: She is alert and oriented to person, place, and time. She has normal strength and normal reflexes.   Skin: Skin is warm and dry. Capillary refill takes less than 2 seconds.   Psychiatric: She has a normal mood and affect. Her speech is normal and behavior is normal. Judgment and thought content normal.         ED Course   Procedures  Labs Reviewed   COMPREHENSIVE METABOLIC PANEL - Abnormal; Notable for the following components:       Result Value    Chloride 108 (*)     Glucose Level 121 (*)     Protein Total 7.8 (*)     All other components within normal limits   TROPONIN I - Normal   CBC W/ AUTO DIFFERENTIAL    Narrative:     The following orders were created for panel order CBC auto differential.  Procedure                               Abnormality         Status                     ---------                               -----------         ------                     CBC with Differential[[id number]]                           Final result                 Please view results for these tests on the individual orders.   CBC WITH DIFFERENTIAL   EXTRA TUBES    Narrative:     The following orders were created for panel order EXTRA TUBES.  Procedure                               Abnormality         Status                     ---------                               -----------         ------                     Light Blue Top Hold[[id number]]                                                          Please view results for these tests on the individual orders.   LIGHT BLUE TOP HOLD     EKG Readings: (Independently Interpreted)   Sinus bradycardia, No STEMI       Imaging Results              CT Head Without Contrast (Preliminary result)  Result time 03/21/24 21:22:41       Preliminary result by Shakir Arshad Jr., MD (03/21/24 21:22:41)                   Narrative:    START OF REPORT:  Technique: CT of the head was performed without intravenous contrast with axial as well as coronal and sagittal images.    Comparison: None.    Dosage Information: Automated exposure control was utilized.    Clinical history: HEADACHES (PT IN /AASI W CO RECURRENT HA AND ELEVATED BP X 3 MONTHS. REPORTS PRISMS IN VISUAL FIELD AND BODY HUMMING BUT NOT AT PRESENT.  EN ROUTE, 18 G TO LT FOREARM /AASI.    Findings:  Hemorrhage: No acute intracranial hemorrhage is seen.  CSF spaces: The ventricles sulci and basal cisterns are within normal limits.  Brain parenchyma: Unremarkable with preservation of the grey white junction throughout.  Cerebellum: Unremarkable.  Vascular: Unremarkable venous sinuses. Mild atheromatous calcification of the intracranial arteries is seen.  Sella and skull base: The sella appears to be within normal limits for age.  Intracranial calcifications: Incidental note is made of bilateral choroid plexus calcification. Incidental note is made of some pineal region calcification.  Calvarium: No acute linear or depressed skull fracture is seen.    Maxillofacial Structures:  Paranasal sinuses: The visualized paranasal sinuses appear clear with no mucoperiosteal thickening or air fluid levels identified.  Orbits: The orbits appear unremarkable.  Zygomatic arches: The zygomatic arches are intact and unremarkable.  Temporal bones and mastoids: The temporal bones and mastoids appear unremarkable.  TMJ: The mandibular condyles appear normally placed with respect to the mandibular fossa.  Nasal Bones: The nasal septum is midline. No displaced nasal bone fracture is seen.      Impression:  1. No acute intracranial process identified. Details and findings as noted above.                                         Medications   baclofen tablet 10 mg (10 mg Oral Given 3/21/24 2030)      Medical Decision Making  Differential:  HTN  Anxiety  Electrolyte imbalance  Anemia    Amount and/or Complexity of Data Reviewed  Labs: ordered.  Radiology: ordered.    Risk  Prescription drug management.               ED Course as of 03/21/24 2207   Thu Mar 21, 2024   2204 Given strict ED return precautions. I have spoken with the patient and/or caregivers. I have explained the patient's condition, diagnoses and treatment plan based on the information available to me at this time. I have answered the patient's and/or caregiver's questions and addressed any concerns. The patient and/or caregivers have as good an understanding of the patient's diagnosis, condition and treatment plan as can be expected at this point. The vital signs have been stable. The patient's condition is stable and appropriate for discharge from the emergency department.        During discharge, pt admitted to not always taking her medication for her blood pressure as directed. I have stressed to pt that if she does not take her medication as prescribed then episodes of elevated blood pressure can definitely occur. Pt voiced understanding.    The patient will pursue further outpatient evaluation with the primary care physician or other designated or consulting physician as outlined in the discharge instructions. The patient and/or caregivers are agreeable to this plan of care and follow-up instructions have been explained in detail. The patient and/or caregivers have received these instructions in written format and have expressed an understanding of the discharge instructions. The patient and/or caregivers are aware that any significant change in condition or worsening of symptoms should prompt an immediate return to this or the closest emergency department or a call to 911.   [JA]      ED Course User Index  [JA] Vinod Boyce Jr., [facility]                           Clinical Impression:  Final diagnoses:  [R42] Dizziness  [I10] Elevated blood  pressure reading with diagnosis of hypertension (Primary)  [M62.838] Muscle spasms of neck          ED Disposition Condition    Discharge Stable          ED Prescriptions       Medication Sig Dispense Start Date End Date Auth. Provider    baclofen (LIORESAL) 10 MG tablet Take 1 tablet (10 mg total) by mouth 3 (three) times daily as needed (muscle spasms). 21 tablet 3/21/2024 -- Vinod Boyce Jr., RACHEAL          Follow-up Information       Follow up With Specialties Details Why Contact Info    Juju Oviedo FNP Nurse Practitioner In 3 days  [address]  Internal Medicine Clinic  [facility] [address]  [phone]      [facility] Emergency Dept Emergency Medicine In 3 days As needed, If symptoms worsen [address] [facility] [id number] [phone]             Vinod Boyce Jr., FNP  03/21/24 2202

## 2024-03-22 NOTE — DISCHARGE INSTRUCTIONS
Keep scheduled appointment on Monday of next week as planned.  Keep a log of your blood pressures.  Take medication as prescribed.  Return to the [facility] ED immediately for onset of chest pain, SOB, or fever.

## 2024-03-25 ENCOUNTER — PATIENT MESSAGE (OUTPATIENT)
Dept: INTERNAL MEDICINE | Facility: CLINIC | Age: 64
End: 2024-03-25

## 2024-03-25 ENCOUNTER — OFFICE VISIT (OUTPATIENT)
Dept: INTERNAL MEDICINE | Facility: CLINIC | Age: 64
End: 2024-03-25
Payer: MEDICAID

## 2024-03-25 VITALS
TEMPERATURE: 98 F | BODY MASS INDEX: 28 KG/M2 | RESPIRATION RATE: 18 BRPM | OXYGEN SATURATION: 99 % | HEIGHT: 64 IN | WEIGHT: 164 LBS | DIASTOLIC BLOOD PRESSURE: 83 MMHG | HEART RATE: 68 BPM | SYSTOLIC BLOOD PRESSURE: 135 MMHG

## 2024-03-25 DIAGNOSIS — I10 PRIMARY HYPERTENSION: Primary | Chronic | ICD-10-CM

## 2024-03-25 DIAGNOSIS — R59.0 AXILLARY LYMPHADENOPATHY: ICD-10-CM

## 2024-03-25 DIAGNOSIS — E11.9 TYPE 2 DIABETES MELLITUS WITHOUT COMPLICATION, WITHOUT LONG-TERM CURRENT USE OF INSULIN: Chronic | ICD-10-CM

## 2024-03-25 DIAGNOSIS — H53.9 VISION CHANGES: ICD-10-CM

## 2024-03-25 DIAGNOSIS — E78.2 MIXED HYPERLIPIDEMIA: Chronic | ICD-10-CM

## 2024-03-25 PROCEDURE — 1159F MED LIST DOCD IN RCRD: CPT | Mod: CPTII,,, | Performed by: NURSE PRACTITIONER

## 2024-03-25 PROCEDURE — 3008F BODY MASS INDEX DOCD: CPT | Mod: CPTII,,, | Performed by: NURSE PRACTITIONER

## 2024-03-25 PROCEDURE — 3075F SYST BP GE 130 - 139MM HG: CPT | Mod: CPTII,,, | Performed by: NURSE PRACTITIONER

## 2024-03-25 PROCEDURE — 1160F RVW MEDS BY RX/DR IN RCRD: CPT | Mod: CPTII,,, | Performed by: NURSE PRACTITIONER

## 2024-03-25 PROCEDURE — 99214 OFFICE O/P EST MOD 30 MIN: CPT | Mod: S$PBB,,, | Performed by: NURSE PRACTITIONER

## 2024-03-25 PROCEDURE — 4010F ACE/ARB THERAPY RXD/TAKEN: CPT | Mod: CPTII,,, | Performed by: NURSE PRACTITIONER

## 2024-03-25 PROCEDURE — 3051F HG A1C>EQUAL 7.0%<8.0%: CPT | Mod: CPTII,,, | Performed by: NURSE PRACTITIONER

## 2024-03-25 PROCEDURE — 99215 OFFICE O/P EST HI 40 MIN: CPT | Mod: PBBFAC | Performed by: NURSE PRACTITIONER

## 2024-03-25 PROCEDURE — 3079F DIAST BP 80-89 MM HG: CPT | Mod: CPTII,,, | Performed by: NURSE PRACTITIONER

## 2024-03-25 RX ORDER — CHOLECALCIFEROL (VITAMIN D3) 25 MCG
1000 TABLET ORAL DAILY
COMMUNITY

## 2024-03-25 RX ORDER — PHYTONADIONE 5 MG/1
5 TABLET ORAL ONCE
COMMUNITY

## 2024-03-25 RX ORDER — ROSUVASTATIN CALCIUM 20 MG/1
20 TABLET, COATED ORAL NIGHTLY
Qty: 30 TABLET | Refills: 11 | Status: SHIPPED | OUTPATIENT
Start: 2024-03-25 | End: 2025-03-25

## 2024-03-25 RX ORDER — VALSARTAN 80 MG/1
80 TABLET ORAL DAILY
Qty: 30 TABLET | Refills: 6 | Status: SHIPPED | OUTPATIENT
Start: 2024-03-25

## 2024-03-25 NOTE — PROGRESS NOTES
"  Juju Oviedo, RACHEAL   [facility]  [address] [facility] [address]      PATIENT NAME: Janet Anthony  : 1960  DATE: 3/25/24  MRN: [id number]      Reason for Visit / Chief Complaint: Follow-up       History of Present Illness / Problem Focused Workflow     Janet Anthony is a 63 y.o. White female presents to the clinic for DM and HTN f/u. Medical problems include DM, HTN, HLD, anxiety, silicone breast implants, Vit B12 deficiency, osteopenia, DDD lumbar, hepatic steatosis, thyroid nodules - thyroid bx  & , + HPV on last pap, tobacco abuse (quit 2022), cervical fusion () and abdominoplasty. Followed by [facility] , Deborah Mark, NP (return prn) and [facility], GYN.      EMR review revealed pt reported to ED on 3/21/24 with elevated bp and visual changes which started on 3/3/24 while watching TV. Reports had 3 spikes this month with reading 220/120s. States was not worried about anything or anxious at times of bp spikes. Reports associated arm swelling with swelling to lymph node to right armpit with tenderness upon palpation. Note states that she was not taking meds as prescribed. BP at goal today. Reports continued visual changes and headaches starts with "prisms". Last eye exam was 6 months ago; has eye appt with Dr. Mercado next Thursday for additional testing. CBGs ranging low 100s when checked. Is not taking crestor as prescribed; is taking garlic instead. Labs reviewed with pt. Denies chest pain, shortness of breath, cough, fever, weakness, abdominal pain, nausea, vomiting, diarrhea, constipation, dysuria, SI/HI.      Review of Systems     Review of Systems     See HPI for details    Constitutional: Denies Change in appetite. Denies Chills. Denies Fever. Denies Night sweats.  Eye: Admits vision changes. Denies Discharge. Denies Eye pain.  ENT: Denies Decreased hearing. Denies Sore throat. Denies Swollen " glands.  Respiratory: Denies Cough. Denies Shortness of breath. Denies Shortness of breath with exertion. Denies Wheezing.  Cardiovascular: Denies Chest pain at rest. Denies Chest pain with exertion. Denies Irregular heartbeat. Denies Palpitations. Denies Edema.  Gastrointestinal: Denies Abdominal pain. Denies Diarrhea. Denies Nausea. Denies Vomiting. Denies Hematemesis or Hematochezia.  Genitourinary: Denies Dysuria. Denies Urinary frequency. Denies Urinary urgency. Denies Blood in urine.  Endocrine: Denies Cold intolerance. Denies Excessive thirst. Denies Heat intolerance. Denies Weight loss. Denies Weight gain.  Musculoskeletal: Denies Painful joints. Denies Weakness.  Integumentary: Denies Rash. Denies Itching. Denies Dry skin.  Neurologic: Admits Dizziness. Denies Fainting. Admits Headache.  Psychiatric: Denies Depression. Denies Anxiety. Denies Suicidal/Homicidal ideations.    All Other ROS: Negative except as stated in HPI.     Medical / Surgical / Social / Family History       ----------------------------  Diabetes mellitus  Hypertension     Past Surgical History:   Procedure Laterality Date    ABDOMINOPLASTY      BIOPSY OF THYROID      CERVICAL FUSION       SECTION      COLONOSCOPY  2016    COLONOSCOPY, WITH DIRECTED SUBMUCOSAL INJECTION  2023    Procedure: COLONOSCOPY, WITH DIRECTED SUBMUCOSAL INJECTION;  Surgeon: ESME Conner MD;  Location: [facility] ENDOSCOPY;  Service: Gastroenterology;;  Epinephrine, Spot ink    COLONOSCOPY, WITH POLYPECTOMY USING SNARE N/A 2023    Procedure: COLONOSCOPY, WITH POLYPECTOMY USING SNARE;  Surgeon: ESME Conner MD;  Location: [facility] ENDOSCOPY;  Service: Gastroenterology;  Laterality: N/A;    REMOVAL OF BREAST IMPLANT         Social History     Socioeconomic History    Marital status: Single   Tobacco Use    Smoking status: Former     Current packs/day: 0.00     Types: Cigarettes     Quit date: 2022     Years since quittin.2     Smokeless tobacco: Never   Substance and Sexual Activity    Alcohol use: Never    Drug use: Never    Sexual activity: Not Currently     Social Determinants of Health     Financial Resource Strain: Low Risk  (3/21/2023)    Overall Financial Resource Strain (CARDIA)     Difficulty of Paying Living Expenses: Not hard at all   Food Insecurity: No Food Insecurity (3/21/2023)    Hunger Vital Sign     Worried About Running Out of Food in the Last Year: Never true     Ran Out of Food in the Last Year: Never true   Transportation Needs: No Transportation Needs (3/21/2023)    PRAPARE - Transportation     Lack of Transportation (Medical): No     Lack of Transportation (Non-Medical): No   Physical Activity: Inactive (10/27/2023)    Exercise Vital Sign     Days of Exercise per Week: 0 days     Minutes of Exercise per Session: 0 min   Stress: No Stress Concern Present (3/21/2023)    [employer] - Occupational Stress Questionnaire     Feeling of Stress : Not at all   Social Connections: Unknown (9/27/2023)    Social Connection and Isolation Panel [NHANES]     Frequency of Communication with Friends and Family: More than three times a week     Frequency of Social Gatherings with Friends and Family: More than three times a week     Attends Tenriism Services: More than 4 times per year     Active Member of Clubs or Organizations: No     Attends Club or Organization Meetings: Never   Recent Concern: Social Connections - Moderately Isolated (9/27/2023)    Social Connection and Isolation Panel [NHANES]     Frequency of Communication with Friends and Family: More than three times a week     Frequency of Social Gatherings with Friends and Family: More than three times a week     Attends Tenriism Services: More than 4 times per year     Active Member of Clubs or Organizations: No     Attends Club or Organization Meetings: Never     Marital Status:    Housing Stability: Low Risk  (3/21/2023)    Housing  "Stability Vital Sign     Unable to Pay for Housing in the Last Year: No     Number of Places Lived in the Last Year: 1     Unstable Housing in the Last Year: No        Family History   Problem Relation Age of Onset    Lung cancer Mother     Heart attack Father         Medications and Allergies     Medications  Current Outpatient Medications   Medication Instructions    albuterol (PROVENTIL/VENTOLIN HFA) 90 mcg/actuation inhaler Inhalation    aloe vera 25 mg Cap Oral    APPLE CIDER VINEGAR ORAL Oral    baclofen (LIORESAL) 10 mg, Oral, 3 times daily PRN    blood sugar diagnostic Strp To check BG daily, to use with True Metrix Glucometer    cinnamon bark (CINNAMON) 500 mg, Oral, Daily    empagliflozin (JARDIANCE) 10 mg, Oral, Daily    GARLIC ORAL Oral    lancets Misc To check BG daily, to use with True Metrix Glucometer    magnesium 30 mg Tab Oral, Once    omega-3 fatty acids/fish oil (FISH OIL-OMEGA-3 FATTY ACIDS) 300-1,000 mg capsule Oral, Daily    oregano oiL 1,500 mg Cap Oral    pantoprazole (PROTONIX) 40 mg, Oral, Daily    phytonadione (vitamin K1) (MEPHYTON) 5 mg, Oral, Once    pregabalin (LYRICA) 75 mg, Oral, 2 times daily    red yeast rice 600 mg Tab Oral    rosuvastatin (CRESTOR) 20 mg, Oral, Nightly    TRUEPLUS LANCETS 33 gauge Misc Topical (Top)    valsartan (DIOVAN) 80 mg, Oral, Daily    vitamin D (VITAMIN D3) 1,000 Units, Oral, Daily    zinc gluconate 50 mg, Oral, Daily         Allergies  Review of patient's allergies indicates:   Allergen Reactions    Corticosteroids (glucocorticoids)      Elevates blood pressure and blood sugar.    Lisinopril Swelling    Sulfa (sulfonamide antibiotics)     Meloxicam Palpitations       Physical Examination     /83 (BP Location: Right arm, Patient Position: Sitting, BP Method: Large (Automatic))   Pulse 68   Temp 98 °F (36.7 °C) (Oral)   Resp 18   Ht 5' 4" (1.626 m)   Wt 74.4 kg (164 lb)   SpO2 99%   BMI 28.15 kg/m²     Physical Exam  Lymphadenopathy:      " Upper Body:      Right upper body: Axillary adenopathy present.      Left upper body: Axillary adenopathy present.      Comments: Mild with tenderness upon palpation         General: Alert and oriented, No acute distress.  Head: Normocephalic, Atraumatic.  Eye: Pupils are equal, round and reactive to light, Extraocular movements are intact, Sclera non-icteric.  Ears/Nose/Throat: Normal, Mucosa moist,Clear.  Neck/Thyroid: Supple, Non-tender, No carotid bruit, No lymphadenopathy, No JVD, Full range of motion.  Respiratory: Clear to auscultation bilaterally; No wheezes, rales or rhonchi,Non-labored respirations, Symmetrical chest wall expansion.  Cardiovascular: Regular rate and rhythm, S1/S2 normal, No murmurs, rubs or gallops.  Gastrointestinal: Soft, Non-tender, Non-distended, Normal bowel sounds, No palpable organomegaly.  Musculoskeletal: Normal range of motion.  Extremities: No clubbing, cyanosis or edema.  Neurologic: No focal deficits, Cranial Nerves II-XII are grossly intact, Motor strength normal upper and lower extremities, Sensory exam intact.  Psychiatric: Normal interaction, Coherent speech, Appropriate affect       Results     Lab Results   Component Value Date    WBC 5.51 03/21/2024    HGB 14.9 03/21/2024    HCT 43.1 03/21/2024     03/21/2024    CHOL 286 (H) 03/07/2024    TRIG 207 (H) 03/07/2024    ALT 54 03/21/2024    AST 33 03/21/2024     03/21/2024    K 4.1 03/21/2024    CREATININE 0.80 03/21/2024    BUN 12.2 03/21/2024    CO2 23 03/21/2024    TSH 1.325 05/01/2023    INR 0.94 03/19/2023    HGBA1C 7.0 03/07/2024   CT Head Without Contrast  Order: [id number]  Status: Final result       Visible to patient: Yes (not seen)       Next appt: None    0 Result Notes  Details    Reading Physician Reading Date Result Priority   Shakir Arshad Jr., MD  [phone] 3/21/2024 STAT   Parag Ku MD  [phone] 3/22/2024      Narrative & Impression  EXAMINATION:  CT HEAD WITHOUT CONTRAST      CPT: 15096     CLINICAL HISTORY:  Dizziness, persistent/recurrent, cardiac or vascular cause suspected;.     TECHNIQUE:  Axial CT slices through the head were obtained without the administration of contrast.  Sagittal and coronal reconstructions were performed.  Automated exposure control was utilized.  Total DLP is approximately 986 mGy cm.     COMPARISON:  CT head 08/28/2023     FINDINGS:  Mild generalized involutional changes are seen.  No evidence of acute intracranial hemorrhage, mass effect, midline deviation, hydrocephalus, or abnormal extra-axial fluid collection is visualized.  No evidence of acute large vessel territory ischemia/infarction is appreciated.  MRI with diffusion-weighted imaging is more sensitive in the assessment of acute ischemia/infarction.  The basilar cisterns are preserved. The visualized paranasal sinuses and mastoid air cells appear to be grossly clear.  No acute displaced calvarial fracture is visualized.     Impression:     1. No acute intracranial abnormality is visualized.  2. The findings are concordant nighthawk        Electronically signed by: Parag Ku MD  Date:                                            03/22/2024  Time:                                           07:27         Assessment and Plan (including Health Maintenance)     Plan:     1. Type 2 diabetes mellitus without complication, without long-term current use of insulin  A1C 7.0 at goal. Previous A1C 7.3.   Continue jardiance as prescribed.  Follow ADA diet.  Avoid soda, simple sweets, and limit rice/pasta/bread/starches and consume brown options when possible.   Maintain healthy weight with BMI goal <30.   Perform aerobic exercise for 150 minutes per week (or 5 days a week for 30 minutes each day).   Examine feet daily.   Obtain annual dilated eye exam.  Eye exam: 6/8/23 - Has optho visit April 2024  Foot exam: 9/27/23  - Comprehensive Metabolic Panel; Future  - Hemoglobin A1C; Future  - Microalbumin/Creatinine  Ratio, Urine; Future  - Urinalysis; Future    2. Primary hypertension  At goal.  Continue valsartan.  Notify clinic of ongoing symptomatic elevated readings.   Follow a low sodium (less than 2 grams of sodium per day), DASH diet.   Continue medications as prescribed.  Monitor blood pressure and report any consistent values greater than 140/90 and keep a log.  Encouraged continued smoking cessation to aid in BP reduction and co-morbidities.   Maintain healthy weight with a BMI goal of <30.   Aerobic exercise for 150 minutes per week (or 5 days a week for 30 minutes each day).    - Comprehensive Metabolic Panel; Future  - Microalbumin/Creatinine Ratio, Urine; Future  - TSH; Future  - Urinalysis; Future    3. Mixed hyperlipidemia   / Trig 207 / HDL - 46 / Total chol - 286.  Rx crestor.  Pt was attempting holistic measures of apple cider vinegar, garlic and red yeast rice without improvement.  Follow a low cholesterol, low saturated fat diet with less than 200 mg of cholesterol a day.   Avoid fried foods and high saturated fats (kilgore, sausage, cookies, cakes, chips, cheese, whole milk, butter, mayonnaise, creamy dressings, gravy and cream sauces).  Add flax seed or fish oil supplements to diet.   Increase dietary fiber.   Regular exercise improves cholesterol levels.  Physical activity 5 times a week for 30 minutes per day (or 150 minutes per week).   Stressed importance of dietary modifications.    - Lipid Panel; Future    4. Vision changes  Keep optho visit as scheduled next week.  CT head unremarkable 3/21/24.    5. Bilateral axillary lymphadenopathy  US bilateral axillas ordered.  Keep appt when scheduled.  [facility] precautions/s/s of infection discussed with pt; understanding voiced.  WBC WNL.    Problem List Items Addressed This Visit          Ophtho    Vision changes       Cardiac/Vascular    Primary hypertension - Primary (Chronic)    Relevant Orders    Comprehensive Metabolic Panel    Microalbumin/Creatinine  Ratio, Urine    TSH    Urinalysis    Mixed hyperlipidemia (Chronic)    Relevant Orders    Lipid Panel       Endocrine    Type 2 diabetes mellitus without complication, without long-term current use of insulin (Chronic)    Relevant Orders    Comprehensive Metabolic Panel    Hemoglobin A1C    Microalbumin/Creatinine Ratio, Urine    Urinalysis       Other    Axillary lymphadenopathy    Relevant Orders    US Soft Tissue Axilla, Left    US Soft Tissue Axilla, Right         Health Maintenance Due   Topic Date Due    Hepatitis C Screening  Never done    HIV Screening  Never done    Shingles Vaccine (1 of 2) Never done    RSV Vaccine (Age 60+ and Pregnant patients) (1 - 1-dose 60+ series) Never done    TETANUS VACCINE  09/12/2021    COVID-19 Vaccine (2 - 2023-24 season) 09/01/2023    Eye Exam  06/08/2024     Virtual visit in 3 months for HLD f/u with lab one week prior.   Follow up in about 6 months (around 9/25/2024) for Follow up, Lab Results, HLD, HTN, Diabetes.        Signature:  RACHEAL Lerner  [facility]  [address] [facility] [address]

## 2024-03-26 ENCOUNTER — TELEPHONE (OUTPATIENT)
Dept: INTERNAL MEDICINE | Facility: CLINIC | Age: 64
End: 2024-03-26
Payer: MEDICAID

## 2024-03-26 DIAGNOSIS — R59.0 AXILLARY LYMPHADENOPATHY: Primary | ICD-10-CM

## 2024-03-26 NOTE — TELEPHONE ENCOUNTER
----- Message from Estephania Oneill LPN sent at 3/26/2024  2:42 PM CDT -----  Regarding: FW: US Soft Tissue Axilla, Right and US Soft Tissue Axilla, Left    ----- Message -----  From: Skye Bertrand  Sent: 3/26/2024  11:24 AM CDT  To: Preet CASTORENA Staff  Subject: US Soft Tissue Axilla, Right and US Soft Tis#    Per Isabel in the US dept, Please reorder as US Soft Tissue Upper Ext  Right and US Soft Tissue Upper Ext  Left  with specifics in notes.          Thank You,Skye Bertrand  Centralized Scheduling Dept

## 2024-04-10 ENCOUNTER — HOSPITAL ENCOUNTER (OUTPATIENT)
Dept: RADIOLOGY | Facility: HOSPITAL | Age: 64
Discharge: HOME | End: 2024-04-10
Attending: NURSE PRACTITIONER
Payer: MEDICAID

## 2024-04-10 DIAGNOSIS — R59.0 AXILLARY LYMPHADENOPATHY: ICD-10-CM

## 2024-04-10 PROCEDURE — 76882 US LMTD JT/FCL EVL NVASC XTR: CPT | Mod: TC,LT

## 2024-04-10 PROCEDURE — 76882 US LMTD JT/FCL EVL NVASC XTR: CPT | Mod: TC,RT

## 2024-04-29 ENCOUNTER — HOSPITAL ENCOUNTER (EMERGENCY)
Facility: HOSPITAL | Age: 64
Discharge: HOME | End: 2024-04-29
Attending: EMERGENCY MEDICINE
Payer: MEDICAID

## 2024-04-29 VITALS
SYSTOLIC BLOOD PRESSURE: 154 MMHG | DIASTOLIC BLOOD PRESSURE: 86 MMHG | HEIGHT: 64 IN | BODY MASS INDEX: 26.4 KG/M2 | TEMPERATURE: 98 F | RESPIRATION RATE: 18 BRPM | HEART RATE: 68 BPM | WEIGHT: 154.63 LBS | OXYGEN SATURATION: 98 %

## 2024-04-29 DIAGNOSIS — M79.672 FOOT PAIN, LEFT: ICD-10-CM

## 2024-04-29 DIAGNOSIS — S93.602A FOOT SPRAIN, LEFT, INITIAL ENCOUNTER: Primary | ICD-10-CM

## 2024-04-29 PROCEDURE — 99284 EMERGENCY DEPT VISIT MOD MDM: CPT | Mod: 25

## 2024-04-29 PROCEDURE — 63600175 PHARM REV CODE 636 W HCPCS

## 2024-04-29 PROCEDURE — 96372 THER/PROPH/DIAG INJ SC/IM: CPT

## 2024-04-29 RX ORDER — KETOROLAC TROMETHAMINE 30 MG/ML
30 INJECTION, SOLUTION INTRAMUSCULAR; INTRAVENOUS
Status: COMPLETED | OUTPATIENT
Start: 2024-04-29 | End: 2024-04-29

## 2024-04-29 RX ORDER — DICLOFENAC SODIUM 10 MG/G
2 GEL TOPICAL 3 TIMES DAILY PRN
Qty: 100 G | Refills: 0 | Status: SHIPPED | OUTPATIENT
Start: 2024-04-29

## 2024-04-29 RX ORDER — DICLOFENAC SODIUM 75 MG/1
75 TABLET, DELAYED RELEASE ORAL 2 TIMES DAILY PRN
Qty: 20 TABLET | Refills: 0 | Status: SHIPPED | OUTPATIENT
Start: 2024-04-29

## 2024-04-29 RX ADMIN — KETOROLAC TROMETHAMINE 30 MG: 30 INJECTION, SOLUTION INTRAMUSCULAR at 09:04

## 2024-04-29 NOTE — ED PROVIDER NOTES
Encounter Date: 2024       History     Chief Complaint   Patient presents with    Foot Injury     Presents from home with c/o let foot pain/injury after tripping and flling down 2 concrete steps 3 weeks ago. Continues to have pain and swelling to top of left foot despite OTC ibuprofen, rest, ice, and elevation.     The patient is a 63 y.o. female with a history of diabetes,  who presents to [facility] Emergency Department with a chief complaint of left foot pain after a fal. Symptoms began three weeks ago and have been constant since onset. Her  left foot pain is currently rated as a 2/10 in severity and described as aching with no radiation. Associated symptoms include mild swelling. Symptoms are aggravated with ambulation and there are no alleviating factors. The patient denies numbness, tingling, severe swelling, lacerations. She reports taking ibuprofen every 4 hours prior to arrival with mild relief of symptoms. No other reported symptoms at this time.         Review of patient's allergies indicates:   Allergen Reactions    Corticosteroids (glucocorticoids)      Elevates blood pressure and blood sugar.    Lisinopril Swelling    Sulfa (sulfonamide antibiotics)      Past Medical History:   Diagnosis Date    Diabetes mellitus     Hypertension      Past Surgical History:   Procedure Laterality Date    ABDOMINOPLASTY      BIOPSY OF THYROID      CERVICAL FUSION       SECTION      COLONOSCOPY  2016    COLONOSCOPY, WITH DIRECTED SUBMUCOSAL INJECTION  2023    Procedure: COLONOSCOPY, WITH DIRECTED SUBMUCOSAL INJECTION;  Surgeon: ESME Conner MD;  Location: [facility] ENDOSCOPY;  Service: Gastroenterology;;  Epinephrine, Spot ink    COLONOSCOPY, WITH POLYPECTOMY USING SNARE N/A 2023    Procedure: COLONOSCOPY, WITH POLYPECTOMY USING SNARE;  Surgeon: ESME Conner MD;  Location: [facility] ENDOSCOPY;  Service: Gastroenterology;  Laterality: N/A;    REMOVAL OF BREAST IMPLANT       Family History    Problem Relation Name Age of Onset    Lung cancer Mother      Heart attack Father       Social History     Tobacco Use    Smoking status: Former     Current packs/day: 0.00     Types: Cigarettes     Quit date: 2022     Years since quittin.3    Smokeless tobacco: Never   Substance Use Topics    Alcohol use: Never    Drug use: Never     Review of Systems   Constitutional: Negative.  Negative for activity change, appetite change, chills and fatigue.   HENT: Negative.     Eyes: Negative.    Respiratory: Negative.     Cardiovascular: Negative.    Gastrointestinal: Negative.    Genitourinary: Negative.    Musculoskeletal:  Positive for arthralgias and joint swelling. Negative for neck pain.   Skin: Negative.    Neurological: Negative.    All other systems reviewed and are negative.      Physical Exam     Initial Vitals [24 0859]   BP Pulse Resp Temp SpO2   (!) 157/75 (!) 58 18 97.9 °F (36.6 °C) 98 %      MAP       --         Physical Exam    Constitutional: She appears well-developed and well-nourished.   HENT:   Head: Normocephalic and atraumatic.   Eyes: Pupils are equal, round, and reactive to light.   Neck: Neck supple.   Normal range of motion.  Cardiovascular:  Normal rate, regular rhythm, normal heart sounds and intact distal pulses.           Pulmonary/Chest: Breath sounds normal.   Abdominal: Abdomen is soft. Bowel sounds are normal.   Musculoskeletal:         General: Normal range of motion.      Cervical back: Normal range of motion and neck supple.      Left foot: Normal capillary refill. Swelling and tenderness present. No deformity, bunion, bony tenderness or crepitus. Normal pulse.      Comments: TTP anterior, mild swelling noted to anterior foot. +2 pedal pulse, no erythema, sensation intact, cap refill < 2sec     Neurological: She is alert and oriented to person, place, and time. No sensory deficit. GCS score is 15. GCS eye subscore is 4. GCS verbal subscore is 5. GCS motor subscore is 6.    Skin: Skin is warm and dry.   Psychiatric: She has a normal mood and affect.         ED Course   Procedures  Labs Reviewed - No data to display       Imaging Results              X-Ray Foot Complete Left (Preliminary result)  Result time 04/29/24 11:02:40      Wet Read by Chandrika Fermin FNP (04/29/24 11:02:40, [facility] Emergency Medicine)    Negative                                     Medications   ketorolac injection 30 mg (30 mg Intramuscular Given 4/29/24 0945)     Medical Decision Making  The patient is a 63 y.o. female with a history of diabetes,  who presents to [facility] Emergency Department with a chief complaint of left foot pain after a fal. Symptoms began three weeks ago and have been constant since onset. Her  left foot pain is currently rated as a 2/10 in severity and described as aching with no radiation. Associated symptoms include mild swelling. Symptoms are aggravated with ambulation and there are no alleviating factors. The patient denies numbness, tingling, severe swelling, lacerations. She reports taking ibuprofen every 4 hours prior to arrival with mild relief of symptoms. No other reported symptoms at this time.     Xray results discussed. Patient offered hard sole shoe with lace up for comfort. ER precautions given. Discussed following up with PCP.   The patient is resting comfortably in no acute distress.  hemodynamically stable and is without objective evidence for acute process requiring urgent intervention or hospitalization. I provided counseling to patient with regard to condition, the treatment plan, specific conditions for return, and the importance of follow up. Detailed written and verbal instructions provided to patient and he expressed a verbal understanding of the discharge instructions and overall management plan. Reiterated the importance of medication administration and safety and advised patient to follow up with primary care provider in 3-5 days or  sooner if needed. Answered questions at this time. The patient is stable for discharge.       Amount and/or Complexity of Data Reviewed  Radiology: ordered and independent interpretation performed.    Risk  Prescription drug management.      Additional MDM:   Differential Diagnosis:   Differential diagnosis includes fracture, sprain, strain, contusion among others               ED Course as of 04/29/24 1121   Mon Apr 29, 2024   0941 Reviewed allergies with patient. She has taken mobic, ibuprofen, and toradol before with no reactions. She does not know why she has an allergy listed to mobic. I will remove the allergy.  [RQ]      ED Course User Index  [RQ] Chandrika Fermin FNP                           Clinical Impression:  Final diagnoses:  [M79.672] Foot pain, left  [S93.602A] Foot sprain, left, initial encounter (Primary)          ED Disposition Condition    Discharge Stable          ED Prescriptions       Medication Sig Dispense Start Date End Date Auth. Provider    diclofenac sodium (VOLTAREN) 1 % Gel Apply 2 g topically 3 (three) times daily as needed (pain). 100 g 4/29/2024 -- Chandrika Fermin FNP    diclofenac (VOLTAREN) 75 MG EC tablet Take 1 tablet (75 mg total) by mouth 2 (two) times daily as needed (pain). 20 tablet 4/29/2024 -- Chandrika Fermin FNP          Follow-up Information       Follow up With Specialties Details Why Contact Info    Juju Oviedo FNP Nurse Practitioner In 3 days  [address]  Internal Medicine Clinic  [facility] [address]  [phone]      [facility] Emergency Medicine  If symptoms worsen [address] [id number] [phone]             Chandrika Fermin FNP  04/29/24 1121

## 2024-04-29 NOTE — Clinical Note
"Janet"Alvarado Anthony was seen and treated in our emergency department on 4/29/2024.  She may return to work on 05/01/2024.       If you have any questions or concerns, please don't hesitate to call.      Vinod Arguello MD"

## 2025-01-19 ENCOUNTER — OFFICE VISIT (OUTPATIENT)
Dept: URGENT CARE | Facility: CLINIC | Age: 65
End: 2025-01-19
Payer: MEDICARE

## 2025-01-19 ENCOUNTER — TELEPHONE (OUTPATIENT)
Dept: URGENT CARE | Facility: CLINIC | Age: 65
End: 2025-01-19

## 2025-01-19 VITALS
OXYGEN SATURATION: 97 % | BODY MASS INDEX: 25.95 KG/M2 | HEART RATE: 76 BPM | HEIGHT: 64 IN | SYSTOLIC BLOOD PRESSURE: 153 MMHG | RESPIRATION RATE: 20 BRPM | WEIGHT: 152 LBS | DIASTOLIC BLOOD PRESSURE: 78 MMHG | TEMPERATURE: 98 F

## 2025-01-19 DIAGNOSIS — R09.89 SYMPTOMS OF UPPER RESPIRATORY INFECTION (URI): ICD-10-CM

## 2025-01-19 DIAGNOSIS — R05.9 COUGH IN ADULT: Primary | ICD-10-CM

## 2025-01-19 LAB
CTP QC/QA: YES
FLUAV AG UPPER RESP QL IA.RAPID: NOT DETECTED
FLUBV AG UPPER RESP QL IA.RAPID: NOT DETECTED
RSV A 5' UTR RNA NPH QL NAA+PROBE: NOT DETECTED
S PYO RRNA THROAT QL PROBE: NEGATIVE
SARS-COV-2 RNA RESP QL NAA+PROBE: DETECTED

## 2025-01-19 PROCEDURE — 99215 OFFICE O/P EST HI 40 MIN: CPT | Mod: PBBFAC

## 2025-01-19 PROCEDURE — 0241U COVID/RSV/FLU A&B PCR: CPT

## 2025-01-19 PROCEDURE — 99213 OFFICE O/P EST LOW 20 MIN: CPT | Mod: S$PBB,,,

## 2025-01-19 PROCEDURE — 87880 STREP A ASSAY W/OPTIC: CPT | Mod: PBBFAC

## 2025-01-19 RX ORDER — METFORMIN HYDROCHLORIDE 500 MG/1
TABLET ORAL
COMMUNITY

## 2025-01-19 RX ORDER — OLMESARTAN MEDOXOMIL 20 MG/1
1 TABLET ORAL EVERY MORNING
COMMUNITY
Start: 2024-09-26

## 2025-01-19 RX ORDER — LANOLIN ALCOHOL/MO/W.PET/CERES
1 CREAM (GRAM) TOPICAL EVERY MORNING
COMMUNITY

## 2025-01-19 RX ORDER — BENZONATATE 200 MG/1
200 CAPSULE ORAL 3 TIMES DAILY PRN
Qty: 30 CAPSULE | Refills: 0 | Status: SHIPPED | OUTPATIENT
Start: 2025-01-19 | End: 2025-01-29

## 2025-01-19 RX ORDER — LANCETS 33 GAUGE
EACH MISCELLANEOUS
COMMUNITY
Start: 2024-09-01

## 2025-01-19 NOTE — PROGRESS NOTES
Please notify Janet she has tested positive for COVID, no changes in current tx plan, monitor for worsening symptoms.

## 2025-01-19 NOTE — TELEPHONE ENCOUNTER
----- Message from RACHEAL Hylton sent at 1/19/2025  4:41 PM CST -----  Please notify Janet she has tested positive for COVID, no changes in current tx plan, monitor for worsening symptoms.

## 2025-01-19 NOTE — PROGRESS NOTES
"Subjective:       Patient ID: Janet Anthony is a 64 y.o. female.    Vitals:  height is 5' 4" (1.626 m) and weight is 68.9 kg (152 lb). Her oral temperature is 98.4 °F (36.9 °C). Her blood pressure is 153/78 (abnormal) and her pulse is 76. Her respiration is 20 and oxygen saturation is 97%.     Chief Complaint: URI (Cough with green phlegm, nasal congestion, chest congestion, back pain and sore throat. Symptoms started 2 days ago. 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         Constitution: Negative for fever.   HENT:  Positive for sore throat. Negative for ear pain, trouble swallowing and voice change.    Respiratory:  Positive for cough and sputum production. Negative for shortness of breath, stridor and asthma.    Gastrointestinal:  Negative for nausea and vomiting.   Allergic/Immunologic: Negative for asthma.   Neurological:  Positive for headaches.       Objective:      Physical Exam   Constitutional: She is oriented to person, place, and time. She is cooperative. She is easily aroused. She appears ill. awake  HENT:   Head: Normocephalic and atraumatic.   Ears:   Right Ear: Tympanic membrane and ear canal normal.   Left Ear: Tympanic membrane and ear canal normal.   Nose: Mucosal edema present.   Mouth/Throat: Uvula is midline and mucous membranes are normal. Posterior oropharyngeal erythema and cobblestoning present. Tonsils are 1+ on the right. Tonsils are 1+ on the left. No tonsillar exudate.   Eyes: Conjunctivae and lids are normal.   Neck: Neck supple.   Cardiovascular: Normal rate.   Pulmonary/Chest: Effort normal and breath sounds normal.   Lymphadenopathy:     She has no cervical adenopathy.   Neurological: She is alert, oriented to person, place, and time and easily aroused. Gait " normal. GCS eye subscore is 4. GCS verbal subscore is 5. GCS motor subscore is 6.   Skin: Skin is warm, dry and intact. Capillary refill takes less than 2 seconds.   Psychiatric: Her speech is normal and behavior is normal.   Nursing note and vitals reviewed.        Assessment:       1. Cough in adult    2. Symptoms of upper respiratory infection (URI)          Plan:     Strep is negative, PCR is pending, staff will contact patient any abnormal findings, encouraged to continue with holistic measures, may take Tessalon as needed for cough symptoms, monitor for worsening symptoms.  When to seek ER attention discussed.    Cough in adult  -     benzonatate (TESSALON) 200 MG capsule; Take 1 capsule (200 mg total) by mouth 3 (three) times daily as needed for Cough.  Dispense: 30 capsule; Refill: 0    Symptoms of upper respiratory infection (URI)  -     COVID/RSV/FLU A&B PCR; Future; Expected date: 01/19/2025  -     POCT rapid strep A           Results for orders placed or performed in visit on 01/19/25   POCT rapid strep A    Collection Time: 01/19/25  1:21 PM   Result Value Ref Range    Rapid Strep A Screen Negative Negative     Acceptable Yes

## 2025-01-20 ENCOUNTER — HOSPITAL ENCOUNTER (EMERGENCY)
Facility: HOSPITAL | Age: 65
Discharge: HOME | End: 2025-01-20
Attending: FAMILY MEDICINE
Payer: MEDICARE

## 2025-01-20 VITALS
BODY MASS INDEX: 25.88 KG/M2 | HEART RATE: 78 BPM | OXYGEN SATURATION: 96 % | DIASTOLIC BLOOD PRESSURE: 70 MMHG | TEMPERATURE: 98 F | SYSTOLIC BLOOD PRESSURE: 126 MMHG | WEIGHT: 151.56 LBS | RESPIRATION RATE: 18 BRPM | HEIGHT: 64 IN

## 2025-01-20 DIAGNOSIS — M79.10 MYALGIA: ICD-10-CM

## 2025-01-20 DIAGNOSIS — U07.1 COVID-19: Primary | ICD-10-CM

## 2025-01-20 PROCEDURE — 99284 EMERGENCY DEPT VISIT MOD MDM: CPT | Mod: 25

## 2025-01-20 PROCEDURE — 96372 THER/PROPH/DIAG INJ SC/IM: CPT | Performed by: FAMILY MEDICINE

## 2025-01-20 PROCEDURE — 63600175 PHARM REV CODE 636 W HCPCS: Mod: JZ,TB | Performed by: FAMILY MEDICINE

## 2025-01-20 PROCEDURE — 25000003 PHARM REV CODE 250: Performed by: FAMILY MEDICINE

## 2025-01-20 RX ORDER — ACETAMINOPHEN 500 MG
1000 TABLET ORAL
Status: COMPLETED | OUTPATIENT
Start: 2025-01-20 | End: 2025-01-20

## 2025-01-20 RX ORDER — KETOROLAC TROMETHAMINE 30 MG/ML
30 INJECTION, SOLUTION INTRAMUSCULAR; INTRAVENOUS
Status: COMPLETED | OUTPATIENT
Start: 2025-01-20 | End: 2025-01-20

## 2025-01-20 RX ADMIN — ACETAMINOPHEN 1000 MG: 500 TABLET ORAL at 04:01

## 2025-01-20 RX ADMIN — KETOROLAC TROMETHAMINE 30 MG: 30 INJECTION, SOLUTION INTRAMUSCULAR; INTRAVENOUS at 04:01

## 2025-01-20 NOTE — ED PROVIDER NOTES
"Encounter Date: 2025       History     Chief Complaint   Patient presents with    Cough    Generalized Body Aches     States was seen in   and was told COVID +.  States body aches, sore throat and "coughing up blood".   Requesting "something for pain and an antibiotic".       Patient is a rather pleasant 64-year-old lady who presents to the emergency room today on complains of generalized body aches and a cough of about 2 days' duration.  Patient states she went to the walk-in clinic yesterday where she was diagnosed with COVID-19 virus infection and given a prescription for Tessalon Perles but her insurance refused to pay for the Tessalon Perles for her.  Patient complains of aches and pains throughout her entire body even to her fingertips.  Patient states nothing seems to be helping further body aches and complains of associated nausea.  There is no rash, no vomiting, no diarrhea, no shortness of breath, no chest pain, no other associated symptomatology.  Patient states her throat feels itchy and she would like something for the itchiness in her throat.    The history is provided by the patient. No  was used.     Review of patient's allergies indicates:   Allergen Reactions    Corticosteroids (glucocorticoids)      Elevates blood pressure and blood sugar.    Lisinopril Swelling    Sulfa (sulfonamide antibiotics)      Past Medical History:   Diagnosis Date    Diabetes mellitus     Hypertension      Past Surgical History:   Procedure Laterality Date    ABDOMINOPLASTY      BIOPSY OF THYROID      CERVICAL FUSION       SECTION      COLONOSCOPY  2016    COLONOSCOPY, WITH DIRECTED SUBMUCOSAL INJECTION  2023    Procedure: COLONOSCOPY, WITH DIRECTED SUBMUCOSAL INJECTION;  Surgeon: ESME Conner MD;  Location: [facility] ENDOSCOPY;  Service: Gastroenterology;;  Epinephrine, Spot ink    COLONOSCOPY, WITH POLYPECTOMY USING SNARE N/A 2023    Procedure: COLONOSCOPY, " WITH POLYPECTOMY USING SNARE;  Surgeon: ESME Conner MD;  Location: [facility] ENDOSCOPY;  Service: Gastroenterology;  Laterality: N/A;    REMOVAL OF BREAST IMPLANT  2016     Family History   Problem Relation Name Age of Onset    Lung cancer Mother      Heart attack Father       Social History     Tobacco Use    Smoking status: Former     Current packs/day: 0.00     Types: Cigarettes     Quit date: 1/1/2022     Years since quitting: 3.0    Smokeless tobacco: Never   Substance Use Topics    Alcohol use: Never    Drug use: Never     Review of Systems   Constitutional:  Negative for activity change, appetite change and chills.   HENT:  Positive for sore throat. Negative for congestion, ear pain, rhinorrhea and sinus pressure.    Eyes:  Negative for redness and visual disturbance.   Respiratory:  Negative for cough, shortness of breath and wheezing.    Cardiovascular:  Negative for chest pain and palpitations.   Gastrointestinal:  Positive for nausea. Negative for abdominal pain, constipation, diarrhea and vomiting.   Genitourinary:  Negative for dysuria and vaginal discharge.   Musculoskeletal:  Positive for arthralgias and myalgias. Negative for back pain.   Skin:  Negative for color change, pallor and rash.   Neurological:  Negative for dizziness, weakness, light-headedness and headaches.   Hematological:  Negative for adenopathy.   Psychiatric/Behavioral:  Negative for behavioral problems, self-injury and suicidal ideas.    All other systems reviewed and are negative.      Physical Exam     Initial Vitals [01/20/25 0416]   BP Pulse Resp Temp SpO2   126/70 78 18 97.9 °F (36.6 °C) 96 %      MAP       --         Physical Exam    Nursing note and vitals reviewed.  Constitutional: She appears well-developed and well-nourished. She is not diaphoretic. No distress.   Toxic appearing, afebrile, not pale, anicteric, no cyanosis, well hydrated   HENT:   Head: Normocephalic and atraumatic.   Right Ear: External ear normal.    Left Ear: External ear normal.   Nose: Nose normal. Mouth/Throat: Oropharynx is clear and moist. No oropharyngeal exudate.   Eyes: Conjunctivae and EOM are normal. Pupils are equal, round, and reactive to light. Right eye exhibits no discharge. Left eye exhibits no discharge. No scleral icterus.   Neck: Neck supple. No thyromegaly present. No tracheal deviation present.   Normal range of motion.  Cardiovascular:  Normal rate, regular rhythm, normal heart sounds and intact distal pulses.           No murmur heard.  Pulmonary/Chest: Breath sounds normal. No respiratory distress. She has no wheezes. She has no rhonchi. She has no rales.   Abdominal: Abdomen is soft. She exhibits no mass. There is no abdominal tenderness. There is no rebound and no guarding.   Musculoskeletal:         General: Normal range of motion.      Cervical back: Normal range of motion and neck supple.     Lymphadenopathy:     She has no cervical adenopathy.   Neurological: She is alert and oriented to person, place, and time.   Skin: Skin is warm and dry.   Psychiatric: She has a normal mood and affect. Thought content normal.         ED Course   Procedures  Labs Reviewed - No data to display       Imaging Results    None          Medications   ketorolac injection 30 mg (30 mg Intramuscular Given 1/20/25 2131)   acetaminophen tablet 1,000 mg (1,000 mg Oral Given 1/20/25 8596)     Medical Decision Making  Patient here in the emergency room is noted to otherwise be stable and patient is given Toradol 30 mg IM x1 and Tylenol 1 g p.o. x1 which he tolerates.  Patient is requesting an anti-inflammatory but has a noted allergy to steroids which I discussed with her so this can not be given at this time.  Patient is offered Paxlovid which she declines and she is monitored for a prolonged period of time and remained stable.  Patient at this point will be worked up for discharge home and she is advised to use over-the-counter analgesics, antipyretics,  and decongestants as needed.  Patient is also encouraged to increase oral fluid intake and start taking high-dose vitamin-C, high-dose vitamin-D, and high dosing for 7-10 days.  She is encouraged to follow up with the primary medical doctor remotely as needed and return to the ER if clinical picture worsens.  Patient verbalizes her understanding and her condition upon discharge is stable, vitals remained stable, she is able to ambulate independently out of the emergency room.    Risk  OTC drugs.  Prescription drug management.                                  1. Differential diagnosis:  Polymyositis, rhabdomyolysis, polymyalgia  2. Comorbidities considered that were addressed or put patient at increased risk are reviewed and noted  3. External notes reviewed: As stated in MDM  4. Discussed case and management with patient  5. Independent interpretations of workup: No workup is indicated during this encounter  6. Diagnostic therapy is considered, ordered and those not considered. Scores considered  7. Social determinants of health considered (living situation and conditions, lives far, family siutation, psychiatric limitations, and possible substance abuse etc)    Clinical Impression:  Final diagnoses:  [U07.1] COVID-19 (Primary)  [M79.10] Myalgia       This chart is generated using a voice recognition system.  Grammatical and content areas may inadvertently be generated in error.  Please contact me if you find a perceive any inappropriate information in this chart.  Thank you.   ED Disposition Condition    Discharge Stable          ED Prescriptions    None       Follow-up Information       Follow up With Specialties Details Why Contact Info    Juju Oviedo FNP Internal Medicine Call in 2 days As needed [id number] [facility]  Internal Medicine Clinic  [facility] [id number]  [phone]               Eusebia Cotto MD  01/20/25 5794

## 2025-04-15 RX ORDER — LANCETS 33 GAUGE
EACH MISCELLANEOUS
Status: CANCELLED | OUTPATIENT
Start: 2025-04-15

## (undated) DEVICE — Device

## (undated) DEVICE — ELECTRODE PATIENT RETURN DISP

## (undated) DEVICE — KIT SURGICAL COLON .25 1.1OZ

## (undated) DEVICE — MANIFOLD 4 PORT

## (undated) DEVICE — SNARE EXACTO COLD

## (undated) DEVICE — MARKER ENDOSCOPIC SPOT EX

## (undated) DEVICE — TRAP ETRAP POLYP 50 TRAY